# Patient Record
Sex: FEMALE | Race: ASIAN | NOT HISPANIC OR LATINO
[De-identification: names, ages, dates, MRNs, and addresses within clinical notes are randomized per-mention and may not be internally consistent; named-entity substitution may affect disease eponyms.]

---

## 2020-07-27 ENCOUNTER — APPOINTMENT (OUTPATIENT)
Dept: RADIOLOGY | Facility: IMAGING CENTER | Age: 60
End: 2020-07-27

## 2020-07-27 ENCOUNTER — OUTPATIENT (OUTPATIENT)
Dept: OUTPATIENT SERVICES | Facility: HOSPITAL | Age: 60
LOS: 1 days | End: 2020-07-27
Payer: MEDICAID

## 2020-07-27 DIAGNOSIS — Z00.8 ENCOUNTER FOR OTHER GENERAL EXAMINATION: ICD-10-CM

## 2020-07-27 PROCEDURE — 71100 X-RAY EXAM RIBS UNI 2 VIEWS: CPT

## 2020-07-27 PROCEDURE — 71100 X-RAY EXAM RIBS UNI 2 VIEWS: CPT | Mod: 26

## 2020-08-26 PROBLEM — Z00.00 ENCOUNTER FOR PREVENTIVE HEALTH EXAMINATION: Status: ACTIVE | Noted: 2020-08-26

## 2020-09-08 ENCOUNTER — APPOINTMENT (OUTPATIENT)
Dept: SURGERY | Facility: HOSPITAL | Age: 60
End: 2020-09-08

## 2020-09-08 ENCOUNTER — OUTPATIENT (OUTPATIENT)
Dept: OUTPATIENT SERVICES | Facility: HOSPITAL | Age: 60
LOS: 1 days | End: 2020-09-08

## 2020-09-08 VITALS
SYSTOLIC BLOOD PRESSURE: 149 MMHG | BODY MASS INDEX: 36.15 KG/M2 | DIASTOLIC BLOOD PRESSURE: 65 MMHG | WEIGHT: 217 LBS | HEART RATE: 65 BPM | HEIGHT: 65 IN

## 2020-09-08 VITALS — TEMPERATURE: 98.1 F

## 2020-09-08 DIAGNOSIS — R22.2 LOCALIZED SWELLING, MASS AND LUMP, TRUNK: ICD-10-CM

## 2020-09-08 DIAGNOSIS — Z86.79 PERSONAL HISTORY OF OTHER DISEASES OF THE CIRCULATORY SYSTEM: ICD-10-CM

## 2020-09-08 DIAGNOSIS — Z86.39 PERSONAL HISTORY OF OTHER ENDOCRINE, NUTRITIONAL AND METABOLIC DISEASE: ICD-10-CM

## 2020-09-23 ENCOUNTER — APPOINTMENT (OUTPATIENT)
Dept: OBGYN | Facility: HOSPITAL | Age: 60
End: 2020-09-23

## 2020-11-20 ENCOUNTER — APPOINTMENT (OUTPATIENT)
Dept: DERMATOLOGY | Facility: CLINIC | Age: 60
End: 2020-11-20

## 2022-01-20 ENCOUNTER — NON-APPOINTMENT (OUTPATIENT)
Age: 62
End: 2022-01-20

## 2022-01-26 ENCOUNTER — NON-APPOINTMENT (OUTPATIENT)
Age: 62
End: 2022-01-26

## 2022-01-26 ENCOUNTER — APPOINTMENT (OUTPATIENT)
Dept: ORTHOPEDIC SURGERY | Facility: CLINIC | Age: 62
End: 2022-01-26
Payer: MEDICAID

## 2022-01-26 VITALS — HEIGHT: 65 IN | BODY MASS INDEX: 35.65 KG/M2 | WEIGHT: 214 LBS

## 2022-01-26 PROCEDURE — 73562 X-RAY EXAM OF KNEE 3: CPT | Mod: 50

## 2022-01-26 PROCEDURE — 99204 OFFICE O/P NEW MOD 45 MIN: CPT

## 2022-01-26 NOTE — HISTORY OF PRESENT ILLNESS
[de-identified] : Patient is a 61-year-old female complaining of bilateral knee pain.  Severe pain in the bilateral thigh.  For many years.  No trauma fevers or chills.  Patient has history of diabetes.  Fairly well controlled.  Severe knee pain she has tried injections in the past and physical therapy [Worsening] : worsening [2] : a minimum pain level of 2/10 [9] : a maximum pain level of 9/10 [Acetaminophen] : not relieved by acetaminophen [Exercise Regimen] : not relieved by exercise regimen [NSAIDs] : not relieved by nonsteroidal anti-inflammatory drugs

## 2022-01-26 NOTE — PHYSICAL EXAM
[Antalgic] : antalgic [Normal RUE] : Right Upper Extremity: No scars, rashes, lesions, ulcers, skin intact [Normal Touch] : sensation intact for touch [Poor Appearance] : well-appearing [Normal] : no peripheral adenopathy appreciated [de-identified] : Patient appears stated age in no acute respiratory distress. Patient is alert oriented x3. Patient has normal mood and affect. \par Bilateral knee exam\par There is minimal to moderate effusion. Range of motion is 0-120°. Painful at the extremes of range of motion. \par There is medial and lateral joint line tenderness. \par Quadriceps strength is 4/5. There is some muscle loss in the quadriceps. \par Anteroposterior and mediolateral stability is intact Jacklyn test is negative. Alignment of the knee is in 5° of varus.\par 		\par \par Bilateral hip exam\par On inspection of the hip shows skin is normal. No evidence of rash. \par No loss of muscle.  Abductor strength is 5 out of 5. Hip flexor strength is 5.\par Range of motion of the hip at 90° flexion internal rotation is 15° external rotation is 30° pain-free. \par Hip has good stability in anterior and posterior direction. \par On lateral decubitus  examination there is no tenderness in the greater trochanter. \par Lower Extremity Examination \par Bilateral lower extremity skin is normal. There is no rash. There is no edema and lymphadenopathy.  DP and PT pulses intact. Sensation is intact. [de-identified] : X-rays of the bilateral knee 3 views each shows advanced degenerative bone-on-bone arthritis

## 2022-01-26 NOTE — DISCUSSION/SUMMARY
[de-identified] : At this time patient has bilateral knee bone-on-bone arthritis joint destruction we will try conservative treatment NSAIDs therapy strengthening

## 2022-02-09 ENCOUNTER — APPOINTMENT (OUTPATIENT)
Dept: ORTHOPEDIC SURGERY | Facility: HOSPITAL | Age: 62
End: 2022-02-09

## 2022-02-09 ENCOUNTER — OUTPATIENT (OUTPATIENT)
Dept: OUTPATIENT SERVICES | Facility: HOSPITAL | Age: 62
LOS: 1 days | End: 2022-02-09
Payer: MEDICAID

## 2022-02-09 VITALS
HEART RATE: 73 BPM | SYSTOLIC BLOOD PRESSURE: 151 MMHG | TEMPERATURE: 96.5 F | HEIGHT: 65 IN | DIASTOLIC BLOOD PRESSURE: 85 MMHG | WEIGHT: 215 LBS | BODY MASS INDEX: 35.82 KG/M2

## 2022-02-09 DIAGNOSIS — M17.0 BILATERAL PRIMARY OSTEOARTHRITIS OF KNEE: ICD-10-CM

## 2022-02-09 DIAGNOSIS — M25.50 PAIN IN UNSPECIFIED JOINT: ICD-10-CM

## 2022-02-09 PROCEDURE — G0463: CPT

## 2022-02-09 RX ORDER — LOSARTAN POTASSIUM AND HYDROCHLOROTHIAZIDE 12.5; 5 MG/1; MG/1
50-12.5 TABLET ORAL DAILY
Refills: 0 | Status: ACTIVE | COMMUNITY
Start: 2022-02-09

## 2022-02-09 RX ORDER — ESOMEPRAZOLE MAGNESIUM 40 MG/1
40 CAPSULE, DELAYED RELEASE ORAL DAILY
Refills: 0 | Status: ACTIVE | COMMUNITY
Start: 2022-02-09

## 2022-06-15 ENCOUNTER — OUTPATIENT (OUTPATIENT)
Dept: OUTPATIENT SERVICES | Facility: HOSPITAL | Age: 62
LOS: 1 days | End: 2022-06-15
Payer: MEDICAID

## 2022-06-15 ENCOUNTER — APPOINTMENT (OUTPATIENT)
Dept: ORTHOPEDIC SURGERY | Facility: HOSPITAL | Age: 62
End: 2022-06-15

## 2022-06-15 VITALS
WEIGHT: 215 LBS | HEART RATE: 63 BPM | DIASTOLIC BLOOD PRESSURE: 82 MMHG | RESPIRATION RATE: 14 BRPM | BODY MASS INDEX: 35.82 KG/M2 | HEIGHT: 65 IN | TEMPERATURE: 97.5 F | SYSTOLIC BLOOD PRESSURE: 148 MMHG

## 2022-06-15 DIAGNOSIS — M17.0 BILATERAL PRIMARY OSTEOARTHRITIS OF KNEE: ICD-10-CM

## 2022-06-15 DIAGNOSIS — M79.609 PAIN IN UNSPECIFIED LIMB: ICD-10-CM

## 2022-06-15 PROCEDURE — G0463: CPT

## 2022-06-15 RX ORDER — DICLOFENAC SODIUM 1% 10 MG/G
1 GEL TOPICAL DAILY
Qty: 1 | Refills: 0 | Status: ACTIVE | COMMUNITY
Start: 2022-06-15 | End: 1900-01-01

## 2022-06-15 RX ORDER — DICLOFENAC SODIUM 20 MG/G
2 SOLUTION TOPICAL
Qty: 1 | Refills: 1 | Status: COMPLETED | COMMUNITY
Start: 2022-06-15 | End: 2022-06-15

## 2022-10-12 ENCOUNTER — OUTPATIENT (OUTPATIENT)
Dept: OUTPATIENT SERVICES | Facility: HOSPITAL | Age: 62
LOS: 1 days | End: 2022-10-12
Payer: MEDICAID

## 2022-10-12 ENCOUNTER — APPOINTMENT (OUTPATIENT)
Dept: ORTHOPEDIC SURGERY | Facility: HOSPITAL | Age: 62
End: 2022-10-12

## 2022-10-12 VITALS
WEIGHT: 215 LBS | DIASTOLIC BLOOD PRESSURE: 82 MMHG | TEMPERATURE: 97.7 F | RESPIRATION RATE: 14 BRPM | SYSTOLIC BLOOD PRESSURE: 149 MMHG | BODY MASS INDEX: 35.82 KG/M2 | HEART RATE: 61 BPM | HEIGHT: 65 IN

## 2022-10-12 DIAGNOSIS — M79.643 PAIN IN UNSPECIFIED HAND: ICD-10-CM

## 2022-10-12 DIAGNOSIS — M17.0 BILATERAL PRIMARY OSTEOARTHRITIS OF KNEE: ICD-10-CM

## 2022-10-12 PROCEDURE — G0463: CPT

## 2022-10-12 RX ORDER — CELECOXIB 200 MG/1
200 CAPSULE ORAL DAILY
Qty: 60 | Refills: 0 | Status: ACTIVE | COMMUNITY
Start: 2022-10-12 | End: 1900-01-01

## 2022-10-12 RX ORDER — CELECOXIB 200 MG/1
200 CAPSULE ORAL TWICE DAILY
Qty: 56 | Refills: 0 | Status: DISCONTINUED | COMMUNITY
Start: 2022-02-09 | End: 2022-10-12

## 2023-01-04 ENCOUNTER — OUTPATIENT (OUTPATIENT)
Dept: OUTPATIENT SERVICES | Facility: HOSPITAL | Age: 63
LOS: 1 days | End: 2023-01-04
Payer: MEDICAID

## 2023-01-04 ENCOUNTER — APPOINTMENT (OUTPATIENT)
Dept: ORTHOPEDIC SURGERY | Facility: HOSPITAL | Age: 63
End: 2023-01-04

## 2023-01-04 VITALS
TEMPERATURE: 97.4 F | RESPIRATION RATE: 14 BRPM | DIASTOLIC BLOOD PRESSURE: 82 MMHG | HEART RATE: 64 BPM | HEIGHT: 65 IN | WEIGHT: 198 LBS | SYSTOLIC BLOOD PRESSURE: 152 MMHG | BODY MASS INDEX: 32.99 KG/M2

## 2023-01-04 DIAGNOSIS — M25.50 PAIN IN UNSPECIFIED JOINT: ICD-10-CM

## 2023-01-04 PROCEDURE — G0463: CPT

## 2023-01-04 RX ORDER — CELECOXIB 200 MG/1
200 CAPSULE ORAL TWICE DAILY
Qty: 60 | Refills: 0 | Status: ACTIVE | COMMUNITY
Start: 2022-06-15 | End: 1900-01-01

## 2023-01-04 RX ORDER — IBUPROFEN 400 MG/1
400 TABLET, FILM COATED ORAL 3 TIMES DAILY
Qty: 90 | Refills: 1 | Status: ACTIVE | COMMUNITY
Start: 2023-01-04 | End: 1900-01-01

## 2023-01-04 RX ORDER — DICLOFENAC SODIUM 1% 10 MG/G
1 GEL TOPICAL DAILY
Qty: 1 | Refills: 0 | Status: ACTIVE | COMMUNITY
Start: 2023-01-04 | End: 1900-01-01

## 2023-01-05 DIAGNOSIS — M17.0 BILATERAL PRIMARY OSTEOARTHRITIS OF KNEE: ICD-10-CM

## 2023-04-05 ENCOUNTER — RESULT REVIEW (OUTPATIENT)
Age: 63
End: 2023-04-05

## 2023-04-05 ENCOUNTER — OUTPATIENT (OUTPATIENT)
Dept: OUTPATIENT SERVICES | Facility: HOSPITAL | Age: 63
LOS: 1 days | End: 2023-04-05
Payer: MEDICAID

## 2023-04-05 ENCOUNTER — APPOINTMENT (OUTPATIENT)
Dept: ORTHOPEDIC SURGERY | Facility: HOSPITAL | Age: 63
End: 2023-04-05

## 2023-04-05 VITALS
RESPIRATION RATE: 14 BRPM | DIASTOLIC BLOOD PRESSURE: 84 MMHG | BODY MASS INDEX: 32.99 KG/M2 | SYSTOLIC BLOOD PRESSURE: 159 MMHG | HEIGHT: 65 IN | HEART RATE: 60 BPM | WEIGHT: 198 LBS | TEMPERATURE: 98 F

## 2023-04-05 DIAGNOSIS — M17.0 BILATERAL PRIMARY OSTEOARTHRITIS OF KNEE: ICD-10-CM

## 2023-04-05 DIAGNOSIS — M17.11 UNILATERAL PRIMARY OSTEOARTHRITIS, RIGHT KNEE: ICD-10-CM

## 2023-04-05 DIAGNOSIS — M79.9 SOFT TISSUE DISORDER, UNSPECIFIED: ICD-10-CM

## 2023-04-05 PROCEDURE — 72100 X-RAY EXAM L-S SPINE 2/3 VWS: CPT | Mod: 26

## 2023-04-05 PROCEDURE — G0463: CPT

## 2023-04-05 PROCEDURE — 72100 X-RAY EXAM L-S SPINE 2/3 VWS: CPT

## 2023-04-05 PROCEDURE — 73564 X-RAY EXAM KNEE 4 OR MORE: CPT | Mod: 26,50

## 2023-04-05 PROCEDURE — 73564 X-RAY EXAM KNEE 4 OR MORE: CPT

## 2023-04-05 RX ORDER — ASPIRIN ENTERIC COATED TABLETS 81 MG 81 MG/1
81 TABLET, DELAYED RELEASE ORAL DAILY
Refills: 0 | Status: ACTIVE | COMMUNITY
Start: 2023-04-05

## 2023-04-05 RX ORDER — FOLIC ACID 1 MG/1
1 TABLET ORAL DAILY
Refills: 0 | Status: ACTIVE | COMMUNITY
Start: 2023-04-05

## 2023-04-24 ENCOUNTER — APPOINTMENT (OUTPATIENT)
Dept: ORTHOPEDIC SURGERY | Facility: CLINIC | Age: 63
End: 2023-04-24
Payer: MEDICAID

## 2023-04-24 ENCOUNTER — TRANSCRIPTION ENCOUNTER (OUTPATIENT)
Age: 63
End: 2023-04-24

## 2023-04-24 VITALS
SYSTOLIC BLOOD PRESSURE: 157 MMHG | BODY MASS INDEX: 32.99 KG/M2 | OXYGEN SATURATION: 98 % | DIASTOLIC BLOOD PRESSURE: 84 MMHG | HEIGHT: 65 IN | HEART RATE: 61 BPM | TEMPERATURE: 97.2 F | WEIGHT: 198 LBS

## 2023-04-24 DIAGNOSIS — M53.3 SACROCOCCYGEAL DISORDERS, NOT ELSEWHERE CLASSIFIED: ICD-10-CM

## 2023-04-24 DIAGNOSIS — M17.0 BILATERAL PRIMARY OSTEOARTHRITIS OF KNEE: ICD-10-CM

## 2023-04-24 PROCEDURE — 99214 OFFICE O/P EST MOD 30 MIN: CPT

## 2023-04-24 NOTE — DISCUSSION/SUMMARY
[de-identified] : The patient has lower lumbar, sacral and coccygeal pain without trauma.  There is no evidence of fracture.  The pathology and natural history were discussed.  She will obtain a doughnut pillow to unweight the affected area.  She will take Tylenol for pain.  She will apply topical ointments.  In terms of her knees she is making an appointment for evaluation towards knee replacement with Dr. Glover.

## 2023-04-24 NOTE — HISTORY OF PRESENT ILLNESS
[de-identified] : 62 year old female presents for initial evaluation of chronic bilateral knee pain for the past 3 years. She has been seen in the clinic for this issue in the past, has tried PT and steroid injections. Her last injections were in January 2023 with 3 months of relief. Today she complains of constant aching pain, L>R worse with prolonged walking. She is unable to walk more than 2 minutes without pain. She takes Celebrex for pain and wears a knee brace for support. She also has had pain over her tailbone for the past 2 months with sitting.  She is planning to see Dr. Glover for knee surgery.\par

## 2023-04-24 NOTE — PHYSICAL EXAM
[Slightly Antalgic] : slightly antalgic [LE] : Sensory: Intact in bilateral lower extremities [DP] : dorsalis pedis 2+ and symmetric bilaterally [PT] : posterior tibial 2+ and symmetric bilaterally [Normal] : Alert and in no acute distress [Poor Appearance] : well-appearing [Acute Distress] : not in acute distress [de-identified] : The patient has no respiratory distress. Mood and affect are normal. The patient is alert and oriented to person, place and time.\par The patient has tenderness in the lower lumbar region.  She experiences mild pain with forward flexion.  There is tenderness over the sacrum and coccyx.  There is no pain with rotation of the hips.  She has pain with motion of both knees.  Quadriceps and hamstring function are intact.  There is no ligamentous instability.  Calves are soft and nontender.  The skin is intact.  There is no lymphedema. [de-identified] : ACC: [id number] EXAM: XR KNEE COMP 4+ VIEWS BI ORDERED BY: REGGIE OLSON\par \par PROCEDURE DATE: 04/05/2023\par \par INTERPRETATION: CLINICAL INDICATION: bilateral knee pain\par \par EXAM:\par Weightbearing AP oblique lateral and sunrise views of both knees from 4/5/2023 at 1342. No similar prior studies available for comparison.\par \par IMPRESSION:\par No fractures dislocations or joint effusions.\par \par Fairly symmetric advanced bilateral knee tricompartment osteoarthritis with medial tibiofemoral compartment predominance demonstrating contacting joint surfaces and genu varus deformities. No joint margin erosions.\par \par Left more conspicuous than right inferior patellar enthesophytes.\par \par Generalized osteopenia otherwise no discrete lytic or blastic lesions.\par \par --- End of Report ---\par \par \par HAZEL BAIN MD; Attending Radiologist\par This document has been electronically signed. Apr 5 2023 5:20PM\par \par \par \par ACC: [id number] EXAM: XR LS SPINE AP LAT 2-3 VIEWS ORDERED BY: REGGIE OLSON\par \par PROCEDURE DATE: 04/05/2023\par INTERPRETATION: CLINICAL INDICATION: low back pain\par \par EXAM:\par AP lateral coned-down lateral lumbosacral spine from 4/5/2023 at 1342. No similar prior studies available for comparison.\par \par IMPRESSION:\par No compression fractures, spondylolistheses, or spondylolysis defects.\par \par Protuberant degenerative hypertrophic osteophyte formation along right lateral L1-L2 disc margin. Otherwise preserved intervertebral disc spaces.\par \par Unremarkable SI joints and partially visualized hips.\par \par No lytic or blastic lesions.\par \par Surgical clips in right inguinal, upper left hemipelvic, and right upper quadrant regions.\par \par --- End of Report ---\par \par \par HAZEL BAIN MD; Attending Radiologist\par This document has been electronically signed. Apr 5 2023 5:18PM

## 2023-04-27 ENCOUNTER — APPOINTMENT (OUTPATIENT)
Dept: ORTHOPEDIC SURGERY | Facility: CLINIC | Age: 63
End: 2023-04-27
Payer: MEDICAID

## 2023-04-27 VITALS
SYSTOLIC BLOOD PRESSURE: 173 MMHG | DIASTOLIC BLOOD PRESSURE: 98 MMHG | OXYGEN SATURATION: 96 % | WEIGHT: 198 LBS | HEART RATE: 67 BPM | BODY MASS INDEX: 32.99 KG/M2 | HEIGHT: 65 IN

## 2023-04-27 DIAGNOSIS — M17.12 UNILATERAL PRIMARY OSTEOARTHRITIS, LEFT KNEE: ICD-10-CM

## 2023-04-27 PROCEDURE — 73564 X-RAY EXAM KNEE 4 OR MORE: CPT | Mod: 50

## 2023-04-27 PROCEDURE — 99204 OFFICE O/P NEW MOD 45 MIN: CPT

## 2023-04-27 PROCEDURE — 72170 X-RAY EXAM OF PELVIS: CPT

## 2023-04-27 RX ORDER — CELECOXIB 200 MG/1
200 CAPSULE ORAL DAILY
Qty: 14 | Refills: 1 | Status: ACTIVE | COMMUNITY
Start: 2023-04-27 | End: 1900-01-01

## 2023-04-27 NOTE — DISCUSSION/SUMMARY
[de-identified] : Ms. Spencer is a 62-year-old female who presented to the office for evaluation of her bilateral knee pain.  Patient has been experiencing bilateral (left greater than right) knee pain for the last 10 years.  She has tried physical therapy, anti-inflammatories, and injections.  Pain is now affecting her quality of life.  X-rays showed severe bilateral knee osteoarthritis.  Examination showed decreased bilateral knee range of motion and varus alignment. Discussed with patient the examination and imaging findings.  Discussed with patient the operative and nonoperative management of knee osteoarthritis, including total knee arthroplasty.  Discussed the nonoperative management of knee osteoarthritis, including physical therapy, anti-inflammatories, and injections.  Patient has tried nonoperative management, but continues to have knee pain.  Discussed total knee arthroplasty at length, including surgical procedure, hospital course, DVT prophylaxis, antibiotics, physical therapy, recovery, and risks. Patient would like to proceed with total knee arthroplasty.  Discussed that patient will require medical clearance prior to surgery.  Discussed obtaining a CT scan prior to surgery.  Patient understanding and in agreement with the plan.  All questions answered. \par \par Discussed the imaging and physical exam findings with the patient consistent with endstage knee degenerative disease. The patient has failed conservative management including physical therapy, anti-inflammatories, and injections. The risks, benefits and alternatives to total knee replacement were discussed with the patient in detail and the patient elected to proceed with surgery. Discussed the surgical plan with the patient including implant options and surgical approach. \par \par Surgical risks including fracture requiring fixation, instability or dislocation, temporary or permanent leg length inequality, infection, bleeding, stiffness, failure to alleviate pain, failure to achieve desired results, need for further surgery, scar tissue formation, hardware failure, chronic pain, injury to nerves resulting in extremity dysfunction, injury to arteries and veins, deep vein thrombosis or pulmonary embolism requiring anticoagulation and medical risk factors including heart attack, stroke, death, neurological injury, pneumonia, kidney or other organ failure were discussed with the patient. \par \par The patient was also advised that the patient will undergo testing for COVID-19 prior to surgery and that if any evidence of COVID-19 is noted pre-operatively, the surgery may be cancelled. \par \par Patient was understanding and in agreement with the treatment plan. All questions answered.\par \par Plan:\par -Medical Clearance\par -CT Scan Left Lower Extremity\par -Left Total Knee Arthroplasty\par \par Surgical Plan:\par Diagnosis: Left Knee Osteoarthritis\par Laterality: Left\par Operative procedure: Left Total Knee Arthroplasty\par Location: [facility]\par \par DVT prophylaxis: Aspirin 325mg twice per day\par TXA: IV\par Plan for discharge: Home \par Pre- & Post Operative Antibiotics: Cefazolin 2g\par \par Clearances:\par      Medical: Pending\par \par Comorbidities:\par      Metal Allergy: Negative\par      Chronic Pain: Negative\par      Diabetes: Negative, Last A1C: 5.7-5.9\par      Use of Anticoagulation: ASA 81mg once per day\par      Atrial Fibrillation: Negative\par      History of VTE: Negative\par      History of Cardiac Stents: Negative\par      Skin Infections/Open Wounds: Negative\par      MRSA Infection/Colonization: Negative\par      Current Urinary Symptoms: Negative\par      Immunocompromise: Negative \par      Inflammatory Arthritis: Psoriasis, only uses creams, no oral medications\par      Smoking: Negative\par      Drug Use: Negative\par      Alcohol Use: Negative\par      Obstructive Sleep Apnea: Negative\par      Neurologic Disease: Negative

## 2023-04-27 NOTE — HISTORY OF PRESENT ILLNESS
[de-identified] : Translated by Daughter in law\par Ambreen  ID: [id number]\par \par Ms. Spencer is a 62-year-old female presents to the office for evaluation of her bilateral knee pain.  Patient has been experiencing bilateral (left greater than right) knee pain for many years (about 10 years), but has worsened over the last 2 years.  Pain is worse when getting up from a seated position.  Patient can walk about 1 block before experiencing pain and she has difficulty walking.  The pain is now affecting her quality of life.  She has tried 3-4 corticosteroid injections in her bilateral knees, which lasted about 3 months.  Her last injection was on 1/4/2023, which lasted about 2 to 3 months.  She has tried physical therapy and home exercises, with some relief, but not significant.  She has tried Celebrex, Tylenol, and Motrin, with some relief, but now medications are not significantly helping.  No hip or groin pain.  No back pain.  Patient does have some coccygeal pain that is worse with sitting.\par \par History: HTN, Diabetes (Last A1C: 5.7-5.9), HLD

## 2023-04-27 NOTE — PHYSICAL EXAM
[de-identified] : Constitutional: 62 year old female, alert and oriented, cooperative, in no acute distress.\par \par HEENT \par NC/AT.  Appearance: symmetric\par \par Chest/Respiratory \par Respiratory effort: no intercostal retractions or use of accessory muscles. Nonlabored Breathing\par \par Mental Status: \par Judgment, insight: intact\par Orientation: oriented to time, place, and person\par \par Left Knee\par \par Inspection:\par     Skin intact, no rashes or lesions\par     No Effusion\par     Non-tender to palpation over tibial tubercle, patella, and pes insertion.\par     Tenderness over the medial and lateral joint line at the midcoronal line\par \par Range of Motion:\par 	Extension - 0 degrees\par 	Flexion - 105 degrees\par 	Alignment - Varus 8 degrees (Correctable to Varus 3 Degrees)\par 	Extensor lag: None\par \par Stability:\par      Demonstrates no Varus or Valgus instability\par      Negative Anterior or Posterior drawer.\par      Negative Lachman's\par      Negative McMurrays\par \par Patella: stable, tracks well. \par \par Right Knee\par \par Inspection:\par     Skin intact, no rashes or lesions\par     No Effusion\par     Non-tender to palpation over tibial tubercle, patella, lateral joint line, and pes insertion.\par     Tenderness over the medial joint line at the midcoronal line\par \par Range of Motion:\par 	Extension - 0 degrees\par 	Flexion - 100 degrees\par 	Alignment - Varus 5 degrees (Correctable)\par 	Extensor lag: None\par \par Stability:\par      Demonstrates no Varus or Valgus instability\par      Negative Anterior or Posterior drawer.\par      Negative Lachman's\par      Negative McMurrays\par \par Patella: stable, tracks well. \par \par Neurologic Exam\par     Motor intact including 5/5 Extensor Hallucis Longus, 5/5 Flexor Hallucis Longus, 5/5 Tibialis Anterior and 5/5 Gastrocnemius\par     Sensation Intact to Light Touch including Saphenous, Sural, Superficial Peroneal, Deep Peroneal, Tibial nerve distributions\par \par Vascular Exam\par     Foot is warm and well perfused with 2+ Dorsalis Pedis Pulse \par \par No pain with range of motion of the bilateral hips. No lumbar paraspinal muscle tenderness. Tenderness over the Coccyx [de-identified] : XRay:  XRays of the Pelvis (1 View) taken in the office today and discussed with the patient. XRays demonstrate no obvious fracture or dislocation. There is no significant evidence of osteoarthritis or osteophyte formation. (my personal interpretation). \par \par XRay: XRays of the Right Knee (4 Views) taken in the office today and reviewed with the patient. XRays demonstrate tricompartmental joint space narrowing, with bone on bone articulations in the medial compartment, with subchondral sclerosis, overlying osteophytes, all consistent with severe osteoarthritis, KL rdGrdrrdarddrderd:rd rd3rd. There is varus alignment. (my personal interpretation)\par \par XRay: XRays of the Left Knee (4 Views) taken in the office today and reviewed with the patient. XRays demonstrate tricompartmental joint space narrowing, with bone on bone articulations in the medial compartment, with subchondral sclerosis, overlying osteophytes, all consistent with severe osteoarthritis, KL rdGrdrrdarddrderd:rd rd3rd. There is varus alignment. (my personal interpretation)

## 2023-05-02 ENCOUNTER — FORM ENCOUNTER (OUTPATIENT)
Age: 63
End: 2023-05-02

## 2023-05-08 ENCOUNTER — OUTPATIENT (OUTPATIENT)
Dept: OUTPATIENT SERVICES | Facility: HOSPITAL | Age: 63
LOS: 1 days | End: 2023-05-08
Payer: MEDICAID

## 2023-05-08 ENCOUNTER — APPOINTMENT (OUTPATIENT)
Dept: CT IMAGING | Facility: CLINIC | Age: 63
End: 2023-05-08
Payer: MEDICAID

## 2023-05-08 DIAGNOSIS — M17.12 UNILATERAL PRIMARY OSTEOARTHRITIS, LEFT KNEE: ICD-10-CM

## 2023-05-08 PROCEDURE — 73700 CT LOWER EXTREMITY W/O DYE: CPT | Mod: 26,LT

## 2023-05-08 PROCEDURE — 73700 CT LOWER EXTREMITY W/O DYE: CPT

## 2023-05-10 ENCOUNTER — APPOINTMENT (OUTPATIENT)
Dept: ORTHOPEDIC SURGERY | Facility: CLINIC | Age: 63
End: 2023-05-10
Payer: MEDICAID

## 2023-05-10 VITALS
SYSTOLIC BLOOD PRESSURE: 164 MMHG | HEIGHT: 65 IN | DIASTOLIC BLOOD PRESSURE: 88 MMHG | WEIGHT: 198 LBS | BODY MASS INDEX: 32.99 KG/M2

## 2023-05-10 DIAGNOSIS — M54.9 DORSALGIA, UNSPECIFIED: ICD-10-CM

## 2023-05-10 PROCEDURE — 99072 ADDL SUPL MATRL&STAF TM PHE: CPT

## 2023-05-10 PROCEDURE — 99214 OFFICE O/P EST MOD 30 MIN: CPT

## 2023-05-10 RX ORDER — TIZANIDINE 2 MG/1
2 TABLET ORAL EVERY 6 HOURS
Qty: 56 | Refills: 0 | Status: ACTIVE | COMMUNITY
Start: 2023-05-10 | End: 1900-01-01

## 2023-05-10 NOTE — ADDENDUM
[FreeTextEntry1] : I, Janelle Nicolas, acted solely as a scribe for Dr. Linus San MD on this date 05/10/2023  \par \par All medical record entries made by the Scribe were at my, Dr. Linus San MD., direction and personally dictated by me on 05/10/2023 . I have reviewed the chart and agree that the record accurately reflects my personal performance of the history, physical exam, assessment and plan. I have also personally directed, reviewed, and agreed with the chart.

## 2023-05-10 NOTE — HISTORY OF PRESENT ILLNESS
[de-identified] : 62 year old female who presents for initial evaluation of her lower back pain. Patient is accompanied by daughter who reports recent onset of back pain and is concerned that the pain may be due to a fracture from sitting for long periods of time. She reports pain with certain movements of the back. She reports the pain is localized to the back with no radicular sxs.\par Denies any bowel/bladder incontinence. Denies any saddle anesthesia.

## 2023-05-10 NOTE — PHYSICAL EXAM
[de-identified] : Lumbar Physical Exam\par \par Gait - Normal\par \par Station - Normal\par \par Sagittal balance - Normal\par \par Compensatory mechanism? - None\par \par Reflexes\par Patellar - normal\par Gastroc - normal\par Clonus - No\par \par Hip Exam - Normal\par \par Straight leg raise - none\par \par Pulses - 2+ dp/pt\par \par Range of motion - normal\par \par Sensation\par Sensation intact to light touch in L1, L2, L3, L4, L5 and S1 dermatomes bilaterally\par \par Motor \par 	IP	Quad	HS	TA	Gastroc	EHL\par Right	5/5	5/5	5/5	5/5	5/5	5/5\par Left	5/5	5/5	5/5	5/5	5/5	5/5  [de-identified] : Lumbar Radiographs reviewed\par facet arthropathy noted \par degenerative disc changes

## 2023-05-17 ENCOUNTER — APPOINTMENT (OUTPATIENT)
Dept: CT IMAGING | Facility: CLINIC | Age: 63
End: 2023-05-17

## 2023-05-17 ENCOUNTER — OUTPATIENT (OUTPATIENT)
Dept: OUTPATIENT SERVICES | Facility: HOSPITAL | Age: 63
LOS: 1 days | End: 2023-05-17
Payer: MEDICAID

## 2023-05-17 VITALS
HEIGHT: 63 IN | TEMPERATURE: 98 F | WEIGHT: 197.98 LBS | SYSTOLIC BLOOD PRESSURE: 135 MMHG | RESPIRATION RATE: 17 BRPM | DIASTOLIC BLOOD PRESSURE: 75 MMHG | OXYGEN SATURATION: 98 % | HEART RATE: 57 BPM

## 2023-05-17 DIAGNOSIS — M17.12 UNILATERAL PRIMARY OSTEOARTHRITIS, LEFT KNEE: ICD-10-CM

## 2023-05-17 DIAGNOSIS — Z98.49 CATARACT EXTRACTION STATUS, UNSPECIFIED EYE: Chronic | ICD-10-CM

## 2023-05-17 DIAGNOSIS — I10 ESSENTIAL (PRIMARY) HYPERTENSION: ICD-10-CM

## 2023-05-17 DIAGNOSIS — E11.9 TYPE 2 DIABETES MELLITUS WITHOUT COMPLICATIONS: ICD-10-CM

## 2023-05-17 DIAGNOSIS — Z90.710 ACQUIRED ABSENCE OF BOTH CERVIX AND UTERUS: Chronic | ICD-10-CM

## 2023-05-17 DIAGNOSIS — Z91.89 OTHER SPECIFIED PERSONAL RISK FACTORS, NOT ELSEWHERE CLASSIFIED: ICD-10-CM

## 2023-05-17 DIAGNOSIS — Z98.890 OTHER SPECIFIED POSTPROCEDURAL STATES: Chronic | ICD-10-CM

## 2023-05-17 DIAGNOSIS — Z90.49 ACQUIRED ABSENCE OF OTHER SPECIFIED PARTS OF DIGESTIVE TRACT: Chronic | ICD-10-CM

## 2023-05-17 LAB
A1C WITH ESTIMATED AVERAGE GLUCOSE RESULT: 5.4 % — SIGNIFICANT CHANGE UP (ref 4–5.6)
ANION GAP SERPL CALC-SCNC: 14 MMOL/L — SIGNIFICANT CHANGE UP (ref 7–14)
APPEARANCE UR: CLEAR — SIGNIFICANT CHANGE UP
BACTERIA # UR AUTO: NEGATIVE — SIGNIFICANT CHANGE UP
BILIRUB UR-MCNC: NEGATIVE — SIGNIFICANT CHANGE UP
BLD GP AB SCN SERPL QL: NEGATIVE — SIGNIFICANT CHANGE UP
BUN SERPL-MCNC: 9 MG/DL — SIGNIFICANT CHANGE UP (ref 7–23)
CALCIUM SERPL-MCNC: 9.1 MG/DL — SIGNIFICANT CHANGE UP (ref 8.4–10.5)
CHLORIDE SERPL-SCNC: 100 MMOL/L — SIGNIFICANT CHANGE UP (ref 98–107)
CO2 SERPL-SCNC: 24 MMOL/L — SIGNIFICANT CHANGE UP (ref 22–31)
COLOR SPEC: ABNORMAL
CREAT SERPL-MCNC: 0.7 MG/DL — SIGNIFICANT CHANGE UP (ref 0.5–1.3)
DIFF PNL FLD: NEGATIVE — SIGNIFICANT CHANGE UP
EGFR: 97 ML/MIN/1.73M2 — SIGNIFICANT CHANGE UP
EPI CELLS # UR: 3 /HPF — SIGNIFICANT CHANGE UP (ref 0–5)
ESTIMATED AVERAGE GLUCOSE: 108 — SIGNIFICANT CHANGE UP
GLUCOSE SERPL-MCNC: 83 MG/DL — SIGNIFICANT CHANGE UP (ref 70–99)
GLUCOSE UR QL: NEGATIVE — SIGNIFICANT CHANGE UP
HCT VFR BLD CALC: 30.9 % — LOW (ref 34.5–45)
HGB BLD-MCNC: 10.1 G/DL — LOW (ref 11.5–15.5)
HYALINE CASTS # UR AUTO: 1 /LPF — SIGNIFICANT CHANGE UP (ref 0–7)
KETONES UR-MCNC: NEGATIVE — SIGNIFICANT CHANGE UP
LEUKOCYTE ESTERASE UR-ACNC: NEGATIVE — SIGNIFICANT CHANGE UP
MCHC RBC-ENTMCNC: 28.9 PG — SIGNIFICANT CHANGE UP (ref 27–34)
MCHC RBC-ENTMCNC: 32.7 GM/DL — SIGNIFICANT CHANGE UP (ref 32–36)
MCV RBC AUTO: 88.5 FL — SIGNIFICANT CHANGE UP (ref 80–100)
MRSA PCR RESULT.: SIGNIFICANT CHANGE UP
NITRITE UR-MCNC: NEGATIVE — SIGNIFICANT CHANGE UP
NRBC # BLD: 0 /100 WBCS — SIGNIFICANT CHANGE UP (ref 0–0)
NRBC # FLD: 0 K/UL — SIGNIFICANT CHANGE UP (ref 0–0)
PH UR: 7 — SIGNIFICANT CHANGE UP (ref 5–8)
PLATELET # BLD AUTO: 288 K/UL — SIGNIFICANT CHANGE UP (ref 150–400)
POTASSIUM SERPL-MCNC: 3.6 MMOL/L — SIGNIFICANT CHANGE UP (ref 3.5–5.3)
POTASSIUM SERPL-SCNC: 3.6 MMOL/L — SIGNIFICANT CHANGE UP (ref 3.5–5.3)
PROT UR-MCNC: ABNORMAL
RBC # BLD: 3.49 M/UL — LOW (ref 3.8–5.2)
RBC # FLD: 12.8 % — SIGNIFICANT CHANGE UP (ref 10.3–14.5)
RBC CASTS # UR COMP ASSIST: 4 /HPF — SIGNIFICANT CHANGE UP (ref 0–4)
RH IG SCN BLD-IMP: POSITIVE — SIGNIFICANT CHANGE UP
S AUREUS DNA NOSE QL NAA+PROBE: SIGNIFICANT CHANGE UP
SODIUM SERPL-SCNC: 138 MMOL/L — SIGNIFICANT CHANGE UP (ref 135–145)
SP GR SPEC: 1.02 — SIGNIFICANT CHANGE UP (ref 1.01–1.05)
UROBILINOGEN FLD QL: SIGNIFICANT CHANGE UP
WBC # BLD: 5.63 K/UL — SIGNIFICANT CHANGE UP (ref 3.8–10.5)
WBC # FLD AUTO: 5.63 K/UL — SIGNIFICANT CHANGE UP (ref 3.8–10.5)
WBC UR QL: 2 /HPF — SIGNIFICANT CHANGE UP (ref 0–5)

## 2023-05-17 PROCEDURE — 93010 ELECTROCARDIOGRAM REPORT: CPT

## 2023-05-17 RX ORDER — SODIUM CHLORIDE 9 MG/ML
3 INJECTION INTRAMUSCULAR; INTRAVENOUS; SUBCUTANEOUS EVERY 8 HOURS
Refills: 0 | Status: DISCONTINUED | OUTPATIENT
Start: 2023-05-22 | End: 2023-05-24

## 2023-05-17 RX ORDER — SODIUM CHLORIDE 9 MG/ML
1000 INJECTION, SOLUTION INTRAVENOUS
Refills: 0 | Status: DISCONTINUED | OUTPATIENT
Start: 2023-05-22 | End: 2023-05-24

## 2023-05-17 NOTE — H&P PST ADULT - ATTENDING COMMENTS
Ambreen  ID:     Gurpreet Spencer is a 62-year-old female, past medical history of hypertension, diabetes, and hyperlipidemia, who presented to the office for her bilateral knee pain. Patient had been experiencing bilateral (left greater than right) knee pain for about 10 years, but the pain worsened over the last 2 years. Pain was worse when getting up from a seated position. Patient could walk about 1 block before experiencing pain and she had difficulty walking. The pain was affecting her quality of life. She had tried 3-4 corticosteroid injections in her bilateral knees, which lasted about 3 months. Her last injection was on 1/4/2023, which lasted about 2 to 3 months. She had tried physical therapy and home exercises. She has tried Celebrex, Tylenol, and Motrin. XRays showed severe bilateral knee osteoarthritis. Patient was indicated for a Left Total Knee Arthroplasty. She was cleared by Medicine.    Discussed the operative and nonoperative management of knee osteoarthritis at length with the patient. Nonoperative management would consist of pain control, physical therapy, and anti-inflammatories. The patient had failed conservative management, including physical therapy, anti-inflammatories, and injections. The patient did not want to continue nonoperative management due to the impact knee pain has had on the patient’s quality of life. Operative management would consist of total knee arthroplasty. The risks, benefits and alternatives to total knee arthroplasty were discussed with the patient in detail and the patient elected to proceed with surgery. Discussed the surgical plan and implants with the patient, including    robotic assisted total knee arthroplasty. Discussed the recovery process following total knee arthroplasty at length, including, but not limited to, inpatient hospital stay, physical therapy, recovery, antibiotics, and DVT prophylaxis. Surgical risks including fracture requiring fixation, instability or dislocation, temporary or permanent leg length inequality, risks of cementation, infection, bleeding, stiffness, failure to alleviate pain, failure to achieve desired results, need for further surgery, scar tissue formation, hardware failure, component loosening, chronic pain, injury to nerves resulting in extremity dysfunction, injury to arteries and veins, deep vein thrombosis or pulmonary embolism requiring anticoagulation and medical risk factors including heart attack, stroke, death, neurological injury, pneumonia, kidney or other organ failure were discussed with the patient at length.    Following discussion, patient elected to proceed with Left Total Knee Arthroplasty with MEIR Robotic Assistance. Informed consent was obtained. Patient's leg was then marked with verbal confirmation of the patient. Patient was understanding and in agreement with the operative plan. All questions were answered.    Physical Exam:  Skin intact, no erythema  Motor: Intact EHL/FHL/Tibialis Anterior/Gastrocnemius  Sensory: Intact Superficial Peroneal/Deep Peroneal/Saphenous/Sural/Tibial Nerves  Vascular: 2+ DP Pulse    Assessment/Plan:   Gurpreet Spencer is a 62 year old female who presented for Left Total Knee Arthroplasty with MEIR Robotic Assistance    Plan:  -Left Total Knee Arthroplasty with MEIR Robotic Assistance  -Clearance in Chart Ambreen  ID: [id number]    Gurpreet Spencer is a 62-year-old female, past medical history of hypertension, diabetes, and hyperlipidemia, who presented to the office for her bilateral knee pain. Patient had been experiencing bilateral (left greater than right) knee pain for about 10 years, but the pain worsened over the last 2 years. Pain was worse when getting up from a seated position. Patient could walk about 1 block before experiencing pain and she had difficulty walking. The pain was affecting her quality of life. She had tried 3-4 corticosteroid injections in her bilateral knees, which lasted about 3 months. Her last injection was on 1/4/2023, which lasted about 2 to 3 months. She had tried physical therapy and home exercises. She has tried Celebrex, Tylenol, and Motrin. XRays showed severe bilateral knee osteoarthritis. Patient was indicated for a Left Total Knee Arthroplasty. She was cleared by Medicine.    Discussed the operative and nonoperative management of knee osteoarthritis at length with the patient. Nonoperative management would consist of pain control, physical therapy, and anti-inflammatories. The patient had failed conservative management, including physical therapy, anti-inflammatories, and injections. The patient did not want to continue nonoperative management due to the impact knee pain has had on the patient’s quality of life. Operative management would consist of total knee arthroplasty. The risks, benefits and alternatives to total knee arthroplasty were discussed with the patient in detail and the patient elected to proceed with surgery. Discussed the surgical plan and implants with the patient, including    robotic assisted total knee arthroplasty. Discussed the recovery process following total knee arthroplasty at length, including, but not limited to, inpatient hospital stay, physical therapy, recovery, antibiotics, and DVT prophylaxis. Surgical risks including fracture requiring fixation, instability or dislocation, temporary or permanent leg length inequality, risks of cementation, infection, bleeding, stiffness, failure to alleviate pain, failure to achieve desired results, need for further surgery, scar tissue formation, hardware failure, component loosening, chronic pain, injury to nerves resulting in extremity dysfunction, injury to arteries and veins, deep vein thrombosis or pulmonary embolism requiring anticoagulation and medical risk factors including heart attack, stroke, death, neurological injury, pneumonia, kidney or other organ failure were discussed with the patient at length.    Following discussion, patient elected to proceed with Left Total Knee Arthroplasty with MEIR Robotic Assistance. Informed consent was obtained. Patient's leg was then marked with verbal confirmation of the patient. Patient was understanding and in agreement with the operative plan. All questions were answered.    Physical Exam:  Skin intact, no erythema  Motor: Intact EHL/FHL/Tibialis Anterior/Gastrocnemius  Sensory: Intact Superficial Peroneal/Deep Peroneal/Saphenous/Sural/Tibial Nerves  Vascular: 2+ DP Pulse    Assessment/Plan:   Gurpreet Spencer is a 62 year old female who presented for Left Total Knee Arthroplasty with MEIR Robotic Assistance    Plan:  -Left Total Knee Arthroplasty with MEIR Robotic Assistance  -Clearance in Chart

## 2023-05-17 NOTE — H&P PST ADULT - PRIMARY CARE PROVIDER
Dr. Serna Dr. Serna, H Dr. Serna, [facility],  [phone]

## 2023-05-17 NOTE — H&P PST ADULT - NSICDXPASTMEDICALHX_GEN_ALL_CORE_FT
PAST MEDICAL HISTORY:  HLD (hyperlipidemia)     HTN (hypertension)     Type 2 diabetes mellitus      PAST MEDICAL HISTORY:  HLD (hyperlipidemia)     HTN (hypertension)     Obesity     Type 2 diabetes mellitus

## 2023-05-17 NOTE — H&P PST ADULT - MS GEN HX ROS MEA POS PC
arthritis/joint swelling/joint pain/stiffness/back pain arthritis/joint swelling/joint pain/muscle weakness/stiffness/back pain

## 2023-05-17 NOTE — H&P PST ADULT - RESPIRATORY
clear to auscultation bilaterally/no wheezes/no rales/no rhonchi/airway patent clear to auscultation bilaterally/no wheezes/no rales/no rhonchi/no respiratory distress/airway patent/breath sounds equal/good air movement

## 2023-05-17 NOTE — H&P PST ADULT - HISTORY OF PRESENT ILLNESS
severe arthritis, long term h/o bilateral knee pain , L>R. S/P steroid injections x 3 with moderate relief. Had intermittent  P.T with minimal relief.  63 y/o F with H/O: HTN, DM, HLD, Obesity presents to PST for pre op evaluation with long term h/o bilateral knee pain , L>R. S/P steroid injections x 3 with moderate relief. Had intermittent  P.T with minimal relief.  63 y/o F with H/O: HTN, DM, HLD, Obesity presents to PST for pre op evaluation with long term h/o bilateral knee pain , L>R. S/P steroid injections x 3 with moderate relief. Had intermittent P.T with minimal relief. Pre op diagnosis: unilateral primary osteoarthritis left knee. Now schedule for left total knee arthroplasty tentatively on 05/22/23

## 2023-05-17 NOTE — H&P PST ADULT - PROBLEM SELECTOR PLAN 1
Schedule for left total knee arthroplasty tentatively on 05/22/23. Pre op instructions, famotidine, chlorhexidine gluconate soap given and explained. Pt verbalized understanding.    Medical consultation as per surgeon's request

## 2023-05-17 NOTE — H&P PST ADULT - NSICDXPASTSURGICALHX_GEN_ALL_CORE_FT
PAST SURGICAL HISTORY:  H/O cataract extraction     History of cholecystectomy     Status post excision of lipoma     Status post total abdominal hysterectomy and bilateral salpingo-oophorectomy (REGGIE-BSO)

## 2023-05-17 NOTE — H&P PST ADULT - HAVE YOU HAD COVID IN THE LAST 60 DAYS?
Agency/Facility Name: [facility]  Outcome: Via faxed response.  Declined.  No accepting psychiatrist at this time.       No

## 2023-05-18 LAB
CULTURE RESULTS: SIGNIFICANT CHANGE UP
SPECIMEN SOURCE: SIGNIFICANT CHANGE UP

## 2023-05-19 PROBLEM — E11.9 TYPE 2 DIABETES MELLITUS WITHOUT COMPLICATIONS: Chronic | Status: ACTIVE | Noted: 2023-05-17

## 2023-05-19 PROBLEM — I10 ESSENTIAL (PRIMARY) HYPERTENSION: Chronic | Status: ACTIVE | Noted: 2023-05-17

## 2023-05-19 PROBLEM — E78.5 HYPERLIPIDEMIA, UNSPECIFIED: Chronic | Status: ACTIVE | Noted: 2023-05-17

## 2023-05-19 PROBLEM — E66.9 OBESITY, UNSPECIFIED: Chronic | Status: ACTIVE | Noted: 2023-05-17

## 2023-05-19 NOTE — ASU PATIENT PROFILE, ADULT - FALL HARM RISK - UNIVERSAL INTERVENTIONS
Bed in lowest position, wheels locked, appropriate side rails in place/Call bell, personal items and telephone in reach/Instruct patient to call for assistance before getting out of bed or chair/Non-slip footwear when patient is out of bed/[place] to call system/Physically safe environment - no spills, clutter or unnecessary equipment/Purposeful Proactive Rounding/Room/bathroom lighting operational, light cord in reach

## 2023-05-19 NOTE — ASU PATIENT PROFILE, ADULT - NSICDXPASTMEDICALHX_GEN_ALL_CORE_FT
PAST MEDICAL HISTORY:  HLD (hyperlipidemia)     HTN (hypertension)     Obesity     Type 2 diabetes mellitus

## 2023-05-19 NOTE — ASU PATIENT PROFILE, ADULT - PATIENT REPRESENTATIVE PHONE
Please add her to my schedule for a clinic visit later today ( office visit)   if she is still coughing  
Mika Cooper
[phone]

## 2023-05-21 ENCOUNTER — TRANSCRIPTION ENCOUNTER (OUTPATIENT)
Age: 63
End: 2023-05-21

## 2023-05-22 ENCOUNTER — TRANSCRIPTION ENCOUNTER (OUTPATIENT)
Age: 63
End: 2023-05-22

## 2023-05-22 ENCOUNTER — INPATIENT (INPATIENT)
Facility: HOSPITAL | Age: 63
LOS: 1 days | Discharge: NURSING, REHAB OR LONG-TERM CARE | End: 2023-05-24
Attending: STUDENT IN AN ORGANIZED HEALTH CARE EDUCATION/TRAINING PROGRAM | Admitting: STUDENT IN AN ORGANIZED HEALTH CARE EDUCATION/TRAINING PROGRAM
Payer: MEDICAID

## 2023-05-22 ENCOUNTER — APPOINTMENT (OUTPATIENT)
Dept: ORTHOPEDIC SURGERY | Facility: HOSPITAL | Age: 63
End: 2023-05-22

## 2023-05-22 VITALS
HEART RATE: 58 BPM | WEIGHT: 197.98 LBS | TEMPERATURE: 97 F | HEIGHT: 63 IN | RESPIRATION RATE: 16 BRPM | SYSTOLIC BLOOD PRESSURE: 148 MMHG | DIASTOLIC BLOOD PRESSURE: 70 MMHG | OXYGEN SATURATION: 99 %

## 2023-05-22 DIAGNOSIS — Z90.49 ACQUIRED ABSENCE OF OTHER SPECIFIED PARTS OF DIGESTIVE TRACT: Chronic | ICD-10-CM

## 2023-05-22 DIAGNOSIS — M17.12 UNILATERAL PRIMARY OSTEOARTHRITIS, LEFT KNEE: ICD-10-CM

## 2023-05-22 DIAGNOSIS — Z90.710 ACQUIRED ABSENCE OF BOTH CERVIX AND UTERUS: Chronic | ICD-10-CM

## 2023-05-22 DIAGNOSIS — Z98.890 OTHER SPECIFIED POSTPROCEDURAL STATES: Chronic | ICD-10-CM

## 2023-05-22 DIAGNOSIS — Z98.49 CATARACT EXTRACTION STATUS, UNSPECIFIED EYE: Chronic | ICD-10-CM

## 2023-05-22 LAB
ANION GAP SERPL CALC-SCNC: 12 MMOL/L — SIGNIFICANT CHANGE UP (ref 7–14)
BUN SERPL-MCNC: 10 MG/DL — SIGNIFICANT CHANGE UP (ref 7–23)
CALCIUM SERPL-MCNC: 9 MG/DL — SIGNIFICANT CHANGE UP (ref 8.4–10.5)
CHLORIDE SERPL-SCNC: 103 MMOL/L — SIGNIFICANT CHANGE UP (ref 98–107)
CO2 SERPL-SCNC: 23 MMOL/L — SIGNIFICANT CHANGE UP (ref 22–31)
CREAT SERPL-MCNC: 0.69 MG/DL — SIGNIFICANT CHANGE UP (ref 0.5–1.3)
EGFR: 97 ML/MIN/1.73M2 — SIGNIFICANT CHANGE UP
GLUCOSE BLDC GLUCOMTR-MCNC: 116 MG/DL — HIGH (ref 70–99)
GLUCOSE BLDC GLUCOMTR-MCNC: 117 MG/DL — HIGH (ref 70–99)
GLUCOSE BLDC GLUCOMTR-MCNC: 127 MG/DL — HIGH (ref 70–99)
GLUCOSE BLDC GLUCOMTR-MCNC: 138 MG/DL — HIGH (ref 70–99)
GLUCOSE BLDC GLUCOMTR-MCNC: 85 MG/DL — SIGNIFICANT CHANGE UP (ref 70–99)
GLUCOSE SERPL-MCNC: 110 MG/DL — HIGH (ref 70–99)
HCT VFR BLD CALC: 33.3 % — LOW (ref 34.5–45)
HGB BLD-MCNC: 10.9 G/DL — LOW (ref 11.5–15.5)
MCHC RBC-ENTMCNC: 29.2 PG — SIGNIFICANT CHANGE UP (ref 27–34)
MCHC RBC-ENTMCNC: 32.7 GM/DL — SIGNIFICANT CHANGE UP (ref 32–36)
MCV RBC AUTO: 89.3 FL — SIGNIFICANT CHANGE UP (ref 80–100)
NRBC # BLD: 0 /100 WBCS — SIGNIFICANT CHANGE UP (ref 0–0)
NRBC # FLD: 0 K/UL — SIGNIFICANT CHANGE UP (ref 0–0)
PLATELET # BLD AUTO: 267 K/UL — SIGNIFICANT CHANGE UP (ref 150–400)
POTASSIUM SERPL-MCNC: 4.1 MMOL/L — SIGNIFICANT CHANGE UP (ref 3.5–5.3)
POTASSIUM SERPL-SCNC: 4.1 MMOL/L — SIGNIFICANT CHANGE UP (ref 3.5–5.3)
RBC # BLD: 3.73 M/UL — LOW (ref 3.8–5.2)
RBC # FLD: 12.9 % — SIGNIFICANT CHANGE UP (ref 10.3–14.5)
RH IG SCN BLD-IMP: POSITIVE — SIGNIFICANT CHANGE UP
SODIUM SERPL-SCNC: 138 MMOL/L — SIGNIFICANT CHANGE UP (ref 135–145)
WBC # BLD: 7.28 K/UL — SIGNIFICANT CHANGE UP (ref 3.8–10.5)
WBC # FLD AUTO: 7.28 K/UL — SIGNIFICANT CHANGE UP (ref 3.8–10.5)

## 2023-05-22 PROCEDURE — 73560 X-RAY EXAM OF KNEE 1 OR 2: CPT | Mod: 26,LT

## 2023-05-22 DEVICE — MAKO BONE PIN 3.2MM X 140MM: Type: IMPLANTABLE DEVICE | Site: LEFT | Status: FUNCTIONAL

## 2023-05-22 DEVICE — COMP FEM TRIATHLON PS SZ 4 LT: Type: IMPLANTABLE DEVICE | Site: LEFT | Status: FUNCTIONAL

## 2023-05-22 DEVICE — PIN PINABALL PRELOADABLE 90MMX3.2MM: Type: IMPLANTABLE DEVICE | Site: LEFT | Status: FUNCTIONAL

## 2023-05-22 DEVICE — INSERT TIB BEARING PS X3 SZ 3 9MM STRL: Type: IMPLANTABLE DEVICE | Site: LEFT | Status: FUNCTIONAL

## 2023-05-22 DEVICE — MAKO BONE PIN 3.2MM X 110MM: Type: IMPLANTABLE DEVICE | Site: LEFT | Status: FUNCTIONAL

## 2023-05-22 DEVICE — COMP PATELLA ASYMMETRIC X3 32X10MM: Type: IMPLANTABLE DEVICE | Site: LEFT | Status: FUNCTIONAL

## 2023-05-22 DEVICE — CEMENT SIMPLEX P 40GM: Type: IMPLANTABLE DEVICE | Site: LEFT | Status: FUNCTIONAL

## 2023-05-22 DEVICE — BASEPLATE TIB UNIV TRIATHLON SZ 3: Type: IMPLANTABLE DEVICE | Site: LEFT | Status: FUNCTIONAL

## 2023-05-22 DEVICE — FEM DIST FIXATION PEG MOD TKS: Type: IMPLANTABLE DEVICE | Site: LEFT | Status: FUNCTIONAL

## 2023-05-22 RX ORDER — SODIUM CHLORIDE 9 MG/ML
1000 INJECTION, SOLUTION INTRAVENOUS
Refills: 0 | Status: DISCONTINUED | OUTPATIENT
Start: 2023-05-22 | End: 2023-05-24

## 2023-05-22 RX ORDER — TRAMADOL HYDROCHLORIDE 50 MG/1
50 TABLET ORAL ONCE
Refills: 0 | Status: DISCONTINUED | OUTPATIENT
Start: 2023-05-22 | End: 2023-05-22

## 2023-05-22 RX ORDER — CEFAZOLIN SODIUM 1 G
2000 VIAL (EA) INJECTION EVERY 8 HOURS
Refills: 0 | Status: COMPLETED | OUTPATIENT
Start: 2023-05-22 | End: 2023-05-22

## 2023-05-22 RX ORDER — ONDANSETRON 8 MG/1
4 TABLET, FILM COATED ORAL EVERY 6 HOURS
Refills: 0 | Status: DISCONTINUED | OUTPATIENT
Start: 2023-05-22 | End: 2023-05-24

## 2023-05-22 RX ORDER — INSULIN LISPRO 100/ML
VIAL (ML) SUBCUTANEOUS
Refills: 0 | Status: DISCONTINUED | OUTPATIENT
Start: 2023-05-22 | End: 2023-05-24

## 2023-05-22 RX ORDER — KETOROLAC TROMETHAMINE 30 MG/ML
15 SYRINGE (ML) INJECTION EVERY 6 HOURS
Refills: 0 | Status: DISCONTINUED | OUTPATIENT
Start: 2023-05-22 | End: 2023-05-23

## 2023-05-22 RX ORDER — DEXTROSE 50 % IN WATER 50 %
15 SYRINGE (ML) INTRAVENOUS ONCE
Refills: 0 | Status: DISCONTINUED | OUTPATIENT
Start: 2023-05-22 | End: 2023-05-24

## 2023-05-22 RX ORDER — TRAMADOL HYDROCHLORIDE 50 MG/1
50 TABLET ORAL EVERY 6 HOURS
Refills: 0 | Status: DISCONTINUED | OUTPATIENT
Start: 2023-05-22 | End: 2023-05-24

## 2023-05-22 RX ORDER — SENNA PLUS 8.6 MG/1
2 TABLET ORAL AT BEDTIME
Refills: 0 | Status: DISCONTINUED | OUTPATIENT
Start: 2023-05-22 | End: 2023-05-24

## 2023-05-22 RX ORDER — ACETAMINOPHEN 500 MG
1000 TABLET ORAL ONCE
Refills: 0 | Status: COMPLETED | OUTPATIENT
Start: 2023-05-22 | End: 2023-05-22

## 2023-05-22 RX ORDER — DEXTROSE 50 % IN WATER 50 %
25 SYRINGE (ML) INTRAVENOUS ONCE
Refills: 0 | Status: DISCONTINUED | OUTPATIENT
Start: 2023-05-22 | End: 2023-05-24

## 2023-05-22 RX ORDER — OXYCODONE HYDROCHLORIDE 5 MG/1
5 TABLET ORAL EVERY 4 HOURS
Refills: 0 | Status: DISCONTINUED | OUTPATIENT
Start: 2023-05-22 | End: 2023-05-24

## 2023-05-22 RX ORDER — PANTOPRAZOLE SODIUM 20 MG/1
40 TABLET, DELAYED RELEASE ORAL
Refills: 0 | Status: DISCONTINUED | OUTPATIENT
Start: 2023-05-22 | End: 2023-05-24

## 2023-05-22 RX ORDER — CHLORHEXIDINE GLUCONATE 213 G/1000ML
1 SOLUTION TOPICAL ONCE
Refills: 0 | Status: COMPLETED | OUTPATIENT
Start: 2023-05-22 | End: 2023-05-22

## 2023-05-22 RX ORDER — ACETAMINOPHEN 500 MG
1000 TABLET ORAL ONCE
Refills: 0 | Status: DISCONTINUED | OUTPATIENT
Start: 2023-05-22 | End: 2023-05-24

## 2023-05-22 RX ORDER — ASPIRIN/CALCIUM CARB/MAGNESIUM 324 MG
325 TABLET ORAL
Refills: 0 | Status: DISCONTINUED | OUTPATIENT
Start: 2023-05-22 | End: 2023-05-24

## 2023-05-22 RX ORDER — POLYETHYLENE GLYCOL 3350 17 G/17G
17 POWDER, FOR SOLUTION ORAL AT BEDTIME
Refills: 0 | Status: DISCONTINUED | OUTPATIENT
Start: 2023-05-22 | End: 2023-05-24

## 2023-05-22 RX ORDER — DEXTROSE 50 % IN WATER 50 %
12.5 SYRINGE (ML) INTRAVENOUS ONCE
Refills: 0 | Status: DISCONTINUED | OUTPATIENT
Start: 2023-05-22 | End: 2023-05-24

## 2023-05-22 RX ORDER — MAGNESIUM HYDROXIDE 400 MG/1
30 TABLET, CHEWABLE ORAL DAILY
Refills: 0 | Status: DISCONTINUED | OUTPATIENT
Start: 2023-05-22 | End: 2023-05-24

## 2023-05-22 RX ORDER — SODIUM CHLORIDE 9 MG/ML
500 INJECTION, SOLUTION INTRAVENOUS ONCE
Refills: 0 | Status: COMPLETED | OUTPATIENT
Start: 2023-05-22 | End: 2023-05-22

## 2023-05-22 RX ORDER — CELECOXIB 200 MG/1
200 CAPSULE ORAL
Refills: 0 | Status: DISCONTINUED | OUTPATIENT
Start: 2023-05-23 | End: 2023-05-24

## 2023-05-22 RX ORDER — OXYCODONE HYDROCHLORIDE 5 MG/1
10 TABLET ORAL EVERY 4 HOURS
Refills: 0 | Status: DISCONTINUED | OUTPATIENT
Start: 2023-05-22 | End: 2023-05-24

## 2023-05-22 RX ORDER — INSULIN LISPRO 100/ML
VIAL (ML) SUBCUTANEOUS AT BEDTIME
Refills: 0 | Status: DISCONTINUED | OUTPATIENT
Start: 2023-05-22 | End: 2023-05-24

## 2023-05-22 RX ORDER — SODIUM CHLORIDE 9 MG/ML
500 INJECTION, SOLUTION INTRAVENOUS ONCE
Refills: 0 | Status: COMPLETED | OUTPATIENT
Start: 2023-05-22 | End: 2023-05-23

## 2023-05-22 RX ORDER — PANTOPRAZOLE SODIUM 20 MG/1
40 TABLET, DELAYED RELEASE ORAL ONCE
Refills: 0 | Status: COMPLETED | OUTPATIENT
Start: 2023-05-22 | End: 2023-05-22

## 2023-05-22 RX ORDER — ATORVASTATIN CALCIUM 80 MG/1
10 TABLET, FILM COATED ORAL AT BEDTIME
Refills: 0 | Status: DISCONTINUED | OUTPATIENT
Start: 2023-05-22 | End: 2023-05-24

## 2023-05-22 RX ORDER — GLUCAGON INJECTION, SOLUTION 0.5 MG/.1ML
1 INJECTION, SOLUTION SUBCUTANEOUS ONCE
Refills: 0 | Status: DISCONTINUED | OUTPATIENT
Start: 2023-05-22 | End: 2023-05-24

## 2023-05-22 RX ORDER — FOLIC ACID 0.8 MG
1 TABLET ORAL DAILY
Refills: 0 | Status: DISCONTINUED | OUTPATIENT
Start: 2023-05-22 | End: 2023-05-24

## 2023-05-22 RX ORDER — LOSARTAN POTASSIUM 100 MG/1
50 TABLET, FILM COATED ORAL DAILY
Refills: 0 | Status: DISCONTINUED | OUTPATIENT
Start: 2023-05-22 | End: 2023-05-24

## 2023-05-22 RX ADMIN — Medication 100 MILLIGRAM(S): at 23:08

## 2023-05-22 RX ADMIN — POLYETHYLENE GLYCOL 3350 17 GRAM(S): 17 POWDER, FOR SOLUTION ORAL at 21:46

## 2023-05-22 RX ADMIN — Medication 400 MILLIGRAM(S): at 19:33

## 2023-05-22 RX ADMIN — Medication 325 MILLIGRAM(S): at 17:34

## 2023-05-22 RX ADMIN — PANTOPRAZOLE SODIUM 40 MILLIGRAM(S): 20 TABLET, DELAYED RELEASE ORAL at 07:35

## 2023-05-22 RX ADMIN — Medication 15 MILLIGRAM(S): at 23:59

## 2023-05-22 RX ADMIN — SODIUM CHLORIDE 500 MILLILITER(S): 9 INJECTION, SOLUTION INTRAVENOUS at 14:55

## 2023-05-22 RX ADMIN — Medication 15 MILLIGRAM(S): at 17:35

## 2023-05-22 RX ADMIN — SODIUM CHLORIDE 3 MILLILITER(S): 9 INJECTION INTRAMUSCULAR; INTRAVENOUS; SUBCUTANEOUS at 15:00

## 2023-05-22 RX ADMIN — TRAMADOL HYDROCHLORIDE 50 MILLIGRAM(S): 50 TABLET ORAL at 07:36

## 2023-05-22 RX ADMIN — SODIUM CHLORIDE 500 MILLILITER(S): 9 INJECTION, SOLUTION INTRAVENOUS at 12:11

## 2023-05-22 RX ADMIN — Medication 100 MILLIGRAM(S): at 15:35

## 2023-05-22 RX ADMIN — Medication 15 MILLIGRAM(S): at 23:07

## 2023-05-22 RX ADMIN — SENNA PLUS 2 TABLET(S): 8.6 TABLET ORAL at 21:46

## 2023-05-22 RX ADMIN — CHLORHEXIDINE GLUCONATE 1 APPLICATION(S): 213 SOLUTION TOPICAL at 07:37

## 2023-05-22 RX ADMIN — SODIUM CHLORIDE 3 MILLILITER(S): 9 INJECTION INTRAMUSCULAR; INTRAVENOUS; SUBCUTANEOUS at 21:59

## 2023-05-22 NOTE — DISCHARGE NOTE PROVIDER - NSDCFUSCHEDAPPT_GEN_ALL_CORE_FT
Jose Antonio Glover  [facility] Physician [facility]  ORTHOSUR [address] [facility]  Scheduled Appointment: 06/06/2023

## 2023-05-22 NOTE — PATIENT PROFILE ADULT - NSPROGENOTHERPROVIDER_GEN_A_NUR
Pulmonary Critical Care Progress Note        Date of admission: 2/18/2018    Chief Complaint: Respiratory failure, septic shock    History of Present Illness:  Mr. Edwards is a 36-year-old male with past medical history of traumatic brain injury at age 16, seizures diagnosed in June of 2017. His baseline mental status appears to be nodding and minimal interacting without vocalization. Who was brought to the ICU as a direct admit from [facility] where he was brought today for hypoxia. Reportedly the patient was being weanied off of his phenobarbital by his neurologist when he began having more frequent seizures, approximately one week ago his neurologist increased his Keppra dosing however the seizures continued. His mother did use CBD and THC with some success in slowing the seizures. Since 2 days ago the patient had multiple aspiration events following these seizures. His mother evaluated him with a home oxygen saturation monitor and he was noted to be hypoxic, he was brought to [facility] where he was found to have 28% bands with leukopenia. Chest x-ray was obtained demonstrating bilateral infiltrates right greater than left. He was placed on mechanical ventilation and his oxygenation was unable to be improved more than 85%, he was hypotensive and given multiple boluses of IV fluids without improvement. He was started on levo fed and given multiple doses of Ativan for seizures and transferred to our ICU for higher level of care. He arrives on the ventilator and on pressors critically ill.      ROS:  Respiratory: unable to perform due to the patient's inability to effectively communicate, Cardiac: unable to perform due to the patient's inability to effectively communicate, GI: unable to perform due to the patient's inability to effectively communicate.  All other systems negative. Unchanged    Interval Events:  24 hour interval history reviewed    - SR/ST   - continuous EEG   - Tm 100.4   -  neuro unchanged   - LP today   - T-piece; off vent since 3/4, 30%   - replace K and Mg   - VPA added  Yesterday:   - sz last night ~11:45 PM, L arm/head sz activity; given ativan 2 mg   - april TF   - TLC   - flotation/rotation protocol   - T-piece 30%, no cxr today   - lovenox, PPI   - remains off abx   - replacing K, IV and PO   - d/c midodrine today         PFSH:  No change.    Respiratory:      Pulse Oximetry: 98 %           Exam: tracheostomy tube in good position without surrounding erythema, unlabored respirations, no intercostal retractions or accessory muscle use and rhonchi bibasilar.   ImagingAvailable data reviewed (compared to prior films)        Invalid input(s): [id number]     HemoDynamics:  Pulse: 91, Heart Rate (Monitored): 91  NIBP: 115/59        Exam: regular rate and rhythm, regular rhythm (Sinus), no ectopy          Neuro:  GCS Total Aleida Coma Score: 9   Exam: PERRL, eyes are open, does not follow commands, intermittent sz's noted  Imaging: Available data reviewed       Fluids:  Intake/Output       03/06/18 0700 - 03/07/18 0659 03/07/18 0700 - 03/08/18 0659 03/08/18 0700 - 03/09/18 0659      [id number] 9674-5916 Total 3337-1741 0746-0766 Total 3260-6823 7539-8134 Total       Intake    I.V.  --  -- --  --  50 50  --  -- --    IV Piggyback Volume (IV Piggyback) -- -- -- -- 50 50 -- -- --    Other  150  45 195  60  45 105  --  -- --    Medications (P.O./ Enteral Liquids) 150 45 195 60 45 105 -- -- --    Enteral  1500  260 1760  1620  30 1650  --  -- --    Enteral Volume 5079 424 8617 1440 -- 1440 -- -- --    Free Water / Tube Flush 180 60 240 180 30 210 -- -- --    Total Intake 3727 306 0656 7252 595 3686 -- -- --       Output    Urine  755  925 1680  975  855 1830  --  -- --    Indwelling Cathether   -- -- --    Drains  --  -- --  0  -- 0  --  -- --    Residual Amount (ml) (Discarded) -- -- -- 0 -- 0 -- -- --    Stool  --  -- --  --  -- --  --  -- --    Number of Times  Stooled 1 x -- 1 x 1 x -- 1 x -- -- --    Total Output   -- -- --       Net I/O     895 -336 422 706 -730 -25 -- -- --        Weight: 55.2 kg (121 lb 11.1 oz)  Recent Labs      18   0507  18   0520  18   0537   SODIUM  137  134*  134*   POTASSIUM  3.4*  3.4*  3.8   CHLORIDE  103  100  101   CO2  27  26  26   BUN  10  11  12   CREATININE  <0.20*  <0.20*  <0.20*   MAGNESIUM   --    --   1.8   CALCIUM  8.9  8.5  8.6       GI/Nutrition:  Exam: nondistended, abdomen is soft and non-tender, normal active bowel sounds, G-Tube  no sign of infection, palpable baclofen pump in right lower quadrant   Imaging: Available data reviewed    KUB : No evidence of bowel obstruction.   CT abdomen and pelvis with oral contrast :  1.  No evidence of bowel obstruction or perforation.  2.  Appendix not visualized.  3.  Small to moderate volume ascites of uncertain etiology.  4.  Bilateral pleural effusions with associated atelectasis.  5.  Ill-defined parenchymal opacities in the LEFT lower lung suggesting multifocal pneumonia.  6.  Scoliosis.    Liver Function  Recent Labs      18   05018   0520  18   0537   GLUCOSE  90  91  90       Heme:  Recent Labs      18   05018   0520  18   0537   RBC  3.08*  3.07*  3.09*   HEMOGLOBIN  9.0*  8.9*  9.1*   HEMATOCRIT  28.9*  28.8*  28.5*   PLATELETCT  585*  550*  508*       Infectious Disease:  Monitored Temp  Av.2 °C (98.9 °F)  Min: 35.7 °C (96.3 °F)  Max: 37.8 °C (100 °F)  Micro: reviewed.   Recent Labs      18   0507  18   0520  18   0537   WBC  6.5  6.8  7.4   NEUTSPOLYS  53.90  53.10  57.00   LYMPHOCYTES  24.50  25.30  22.70   MONOCYTES  15.80*  15.50*  14.40*   EOSINOPHILS  4.30  4.80  3.90   BASOPHILS  0.90  0.90  1.20     Current Facility-Administered Medications   Medication Dose Frequency Provider Last Rate Last Dose   • valproate (DEPACON) 500 mg in D5W 50 mL IVPB  500 mg BID  Harris Bolton M.D.   Stopped at 03/07/18 2143   • PHENobarbital solution 120 mg  120 mg BID Aidan Encarnacion M.D.   120 mg at 03/07/18 2044   • potassium bicarbonate (KLYTE) 25 MEQ effervescent tablet TBEF 50 mEq  50 mEq DAILY Jeremy M Gonda, M.D.   50 mEq at 03/07/18 0829   • Pain Pump (patient supplied) Device   Continuous Jeremy M Gonda, M.D.       • enoxaparin (LOVENOX) inj 40 mg  40 mg DAILY Jeremy M Gonda, M.D.   40 mg at 03/07/18 0830   • lacosamide (VIMPAT) tablet 200 mg  200 mg BID Jeremy M Gonda, M.D.   200 mg at 03/07/18 2043   • levETIRAcetam (KEPPRA) 100 MG/ML solution 1,500 mg  1,500 mg Q12HRS Jeremy M Gonda, M.D.   1,500 mg at 03/07/18 2045   • loperamide (IMODIUM) 1 MG/5ML solution 2 mg  2 mg 4X/DAY PRN FARHAT MedinaOBucky   2 mg at 02/26/18 0820   • omeprazole 2 mg/mL in sodium bicarbonate (PRILOSEC) oral susp 40 mg  40 mg DAILY Jon Reynoso D.O.   40 mg at 03/07/18 0830   • levalbuterol (XOPENEX) 1.25 MG/3ML nebulizer solution 1.25 mg  1.25 mg Q6HRS (RT) Jon Reynoso D.O.   1.25 mg at 03/08/18 0627   • Respiratory Care per Protocol   Continuous RT Modesto Appiah Jr., D.O.       • polyethylene glycol/lytes (MIRALAX) PACKET 1 Packet  1 Packet QDAY PRN Modesto Appiah Jr. D.O.   1 Packet at 03/02/18 1527    And   • magnesium hydroxide (MILK OF MAGNESIA) suspension 30 mL  30 mL QDAY PRN Modesto Appiah Jr., D.O.        And   • bisacodyl (DULCOLAX) suppository 10 mg  10 mg QDAY PRN Modesto Appiah Jr., D.O.   Stopped at 03/06/18 1026   • chlorhexidine (PERIDEX) 0.12 % solution 15 mL  15 mL BID JASPAL Alaniz Jr..O.   15 mL at 03/07/18 2045   • lidocaine (XYLOCAINE) 1%  injection  1-2 mL Q30 MIN PRN JASPAL Alaniz Jr..O.       • MD ALERT...Adult ICU Electrolyte Replacement per Pharmacy Protocol   pharmacy to dose JASPAL Alaniz Jr..SAM.       • ipratropium-albuterol (DUONEB) nebulizer solution 3 mL  3 mL Q2HRS PRN (RT) JASPAL Alaniz Jr..O.       • levalbuterol (XOPENEX) 1.25  MG/3ML nebulizer solution 1.25 mg  1.25 mg Q4HRS PRN Duy Whitaker M.D.   1.25 mg at 03/06/18 1420   • miconazole 2%-zinc oxide (RADHA) topical cream   PRN Duy Whitaker M.D.       • acetaminophen (TYLENOL) tablet 650 mg  650 mg Q6HRS PRN Duy Whitaker M.D.   650 mg at 03/05/18 1515   • oxyCODONE immediate-release (ROXICODONE) tablet 5 mg  5 mg Q3HRS PRN Duy Whitaker M.D.        And   • oxyCODONE immediate release (ROXICODONE) tablet 10 mg  10 mg Q3HRS PRN Duy Whitaker M.D.       • ondansetron (ZOFRAN) syringe/vial injection 4 mg  4 mg Q4HRS PRN Duy Whitaker M.D.   4 mg at 02/25/18 1140   • ondansetron (ZOFRAN ODT) dispertab 4 mg  4 mg Q4HRS PRN Duy Whitaker M.D.       • promethazine (PHENERGAN) tablet 12.5-25 mg  12.5-25 mg Q4HRS PRN Duy Whitaker M.D.       • promethazine (PHENERGAN) suppository 12.5-25 mg  12.5-25 mg Q4HRS PRN Duy Whitaker M.D.       • prochlorperazine (COMPAZINE) injection 5-10 mg  5-10 mg Q4HRS PRN Duy Whitaker M.D.       • LORazepam (ATIVAN) injection 4 mg  4 mg Q10 MIN PRN Duy Whitaker M.D.   2 mg at 03/07/18 1054     Last reviewed on 2/18/2018  7:51 PM by Lois Camejo, Pharmacy Intern    Quality  Measures:  Labs reviewed, Medications reviewed and Radiology images reviewed                    Assessment and plan:  Acute hypoxemic respiratory failure - VDRF since 2/18, chronic trach   - T-piece    - RT/O2 protocol, encourage mobilization   - minimize sedatives and maintain aspiration precautions   - intermittent chest x-rays  Left exudative pleural effusion per lytes criteria S/P thoracentesis 3/3   - cx neg  Septic shock from pulmonary source, now unspecified place a distributive shock - improved   - midodrine held, d/c today   - Completed antibiotics, s/p sepsis protocol   - citrobacter suspected colonizer   History of traumatic brain injury, chronic seizure disorder with associated status epilepticus   - Continue antiepileptics per neurology   - ongoing  seizure activity   - appreciated Neuro input   - MRI noted; MRA pending   - NH3 up with VPA, stopped, mom doesn't want lactulose   - awaiting LP to performed by IR (severe scoliosis)  Decubitus ulcerations of the left hip including stage IV disease   - Wound care, optimize nutrition  Pneumonia (Proteus and Klebsiella)   - S/P antibiotics  Mild fluid overload - improved   - Discontinue Lasix and monitor strict I/O   Possible paralytic ileus versus feeding tube malfunction - resolved   - Continue tube feeding trial with advanced to goal home regimen   - Bowel protocol, daily Dulcolax suppository  Hyponatremia    - follow  Iron deficiency anemia - s/p iron supplementation  Hypokalemia/mag - repletion daily as needed  Prophylaxis, enteral nutrition    I discussed the case with neurology in detail today. I will repeat brain imaging with an MRI of the brain. Further EEG likely to include continuous EEG monitoring and further decision regarding antiepileptic therapy per neurology.    Discussed patient condition and risk of morbidity and/or mortality with Family, RN, RT, Pharmacy, Charge nurse / hot rounds and hospitalist.    The patient does remain critically ill. I have managed and ongoing respiratory support, critical care and re-assessed throughout the day.  The patient remains critically ill.  Critical care time = 31 minutes in directly providing and coordinating critical care and extensive data review.  No time overlap and excludes procedures.    Eleazar Grijalva M.D.   none

## 2023-05-22 NOTE — PHYSICAL THERAPY INITIAL EVALUATION ADULT - FOLLOWS COMMANDS/ANSWERS QUESTIONS, REHAB EVAL
How Severe Is Your Skin Lesion?: mild Have Your Skin Lesions Been Treated?: been treated Is This A New Presentation, Or A Follow-Up?: Skin Lesions Additional History: Patient reports small, raised bumps on both cheeks. Patient tried tretinoin to AA but found it too drying. Patient also reports cheeks become redder throughout the course of the day. Patient currently uses gentle face products 100% of the time

## 2023-05-22 NOTE — DISCHARGE NOTE PROVIDER - NSDCCPTREATMENT_GEN_ALL_CORE_FT
PRINCIPAL PROCEDURE  Procedure: Arthroplasty, knee, left, total, robot-assisted  Findings and Treatment: Pain control:    Standing:         -Acetaminophen 500mg - 2 tabs every 8 hours  As needed:        -Tramadol 50mg - 1 tab every 6 hours - Take only if needed for MODERATE pain       -oxycodone 5mg - 1 tab every 4-6 hours - Take only if needed for SEVERE or BREAKTHROUGH pain  Oxycodone and Tramadol have been sent to your pharmacy. Please do not drive, operate machinery, or make important decisions while taking these medications.   Other Medications: (Standing)  -Aspirin (Enteric Coated) 325mg every 12 hours - to prevent blood clots (for 6 weeks post operatively.)  -Protonix 40mg - 1 tab every 24 hours - to prevent stomach irritation/ulcers  -Senna 8.6mg - 2 pills every 24 hours - stool softener  -Miralax 17g - daily - constipation   Follow up: Please follow up at your prescheduled post-operative follow up appointment with Dr. Glover for 2 weeks after hospital discharge. Please call with any questions or concerns including fevers, worsening pain, pus from the wounds, redness of the skin and difficulty breathing or heaviness in the chest at [phone].     PRINCIPAL PROCEDURE  Procedure: Arthroplasty, knee, left, total, robot-assisted  Findings and Treatment: This is a 61yo Female with PMH of HTN, HLD, DM and OA who presents to [facility] for orthopedic surgery. Patient s/p left total knee arthroplasty with Dr. Glover on 5/22/2023. Patient tolerated the procedure well without any intraoperative complications. Patient tolerated physical therapy well, and the pain was controlled. Patient is weight bearing as tolerated with cane/walker as needed. Seen by medical attending for continuity of care and management and cleared for safe discharge. Keep dressing/incision clean, dry and intact. Any suture/staples to be removed on post-op day #14 at your office visit. Patient is on 325mg of ASA for DVT prophylaxis, please take for 6 weeks unless otherwise instructed by your surgeon. Please follow up with Dr. Glover in 2 weeks. Please follow up with your PMD for continuity of care and management as medications may have changed.

## 2023-05-22 NOTE — DISCHARGE NOTE PROVIDER - PROVIDER TOKENS
PROVIDER:[TOKEN:[[id number]],ESTABLISHEDPATIENT:[T]] PROVIDER:[TOKEN:[[id number]],FOLLOWUP:[2 weeks]]

## 2023-05-22 NOTE — DISCHARGE NOTE PROVIDER - NSDCCPCAREPLAN_GEN_ALL_CORE_FT
PRINCIPAL DISCHARGE DIAGNOSIS  Diagnosis: Osteoarthritis of left knee  Assessment and Plan of Treatment: Diet: Continue regular diet upon discharge.   Activity: No heavy lifting > 25 lbs for 4 weeks. Avoid straining or excessive activity x 6 weeks.   -Continue to use your walker when ambulating until your postoperative follow up appointment.   Dressings: Keep dressing clean, dry, and intact. Your doctor will remove your bandage at your post-operative follow up appointment.   Other Care:   -You may shower when you get home but DO NOT soak dressing and/or incision. The water may run over your dressing/incision but DO NOT let the water directly hit your dressing/incision (take a shower with your wound away from the direct stream of water). NO hot tubes, NO bath rubs, NO swimming pools.   -Elevate your operative leg 2 feet above heart level for 2 hours in the morning, 2 hours in the afternoon, and 2 hours in the evening.   -Apply ice for 20min every time you elevate.   -Sit for 90 min/day: 45mins x2 or 30min x3  -DO NOT sit for more than the 90min/day. Walk or lay down when not elevating your leg.   -DO NOT place the elevation pillow behind your knees. Only place it under your calf and heel.   -DO NOT bend more than 45 degrees at the waist       PRINCIPAL DISCHARGE DIAGNOSIS  Diagnosis: Osteoarthritis of left knee  Assessment and Plan of Treatment:

## 2023-05-22 NOTE — ASU PREOP CHECKLIST - SELECT TESTS ORDERED
fsbs/BMP/CBC/Type and Cross/Type and Screen/EKG/POCT Blood Glucose fsbs85 at 7:11am/BMP/CBC/Type and Cross/Type and Screen/EKG/POCT Blood Glucose

## 2023-05-22 NOTE — PHYSICAL THERAPY INITIAL EVALUATION ADULT - PATIENT PROFILE REVIEW, REHAB EVAL
ACTIVITY: OOB to Chair; spoke with covering BREE Quezada for BREE VILLAGOMEZ prior to PT evaluation--> Pt OK for PT consult/OOB activity; Vitals taken; /63mmHg, Heart rate 70bpm/yes

## 2023-05-22 NOTE — PATIENT PROFILE ADULT - FALL HARM RISK - HARM RISK INTERVENTIONS
Assistance with ambulation/Assistance OOB with selected safe patient handling equipment/Communicate Risk of Fall with Harm to all staff/Discuss with provider need for PT consult/Monitor gait and stability/Provide patient with walking aids - walker, cane, crutches/Reinforce activity limits and safety measures with patient and family/Sit up slowly, dangle for a short time, stand at bedside before walking/Tailored Fall Risk Interventions/Use of alarms - bed, chair and/or voice tab/Visual Cue: Yellow wristband and red socks/Bed in lowest position, wheels locked, appropriate side rails in place/Call bell, personal items and telephone in reach/Instruct patient to call for assistance before getting out of bed or chair/Non-slip footwear when patient is out of bed/[place] to call system/Physically safe environment - no spills, clutter or unnecessary equipment/Purposeful Proactive Rounding/Room/bathroom lighting operational, light cord in reach

## 2023-05-22 NOTE — PHYSICAL THERAPY INITIAL EVALUATION ADULT - PERTINENT HX OF CURRENT PROBLEM, REHAB EVAL
Pt is a 62 year old female with PMHx DM, HLD, HTN, hysterectomy,  and cholecystectomy that presents to [facility] for elective Left Total Knee Replacement.

## 2023-05-22 NOTE — PHYSICAL THERAPY INITIAL EVALUATION ADULT - DIAGNOSIS, PT EVAL
Pt s/p Left Total Knee Replacement on 05/22/2023; pt presents with decreased strength and decreased balance.

## 2023-05-22 NOTE — DISCHARGE NOTE PROVIDER - NSDCMRMEDTOKEN_GEN_ALL_CORE_FT
aspirin 81 mg oral tablet: orally once a day  atorvastatin 10 mg oral tablet: 1 orally once a day  CeleBREX 200 mg oral capsule: 1 orally once a day  folic acid: 1 tab orally once a day  ibuprofen 600 mg oral tablet: 1 orally every 8 hours as needed for  moderate pain  losartan-hydrochlorothiazide 50 mg-12.5 mg oral tablet: 1 orally once a day  tiZANidine 2 mg oral capsule: 2 orally every 6 hours PRN  Trulicity Pen 1.5 mg/0.5 mL subcutaneous solution: 1.5 subcutaneously once a week   aspirin 325 mg oral tablet: 1 tab(s) orally 2 times a day for 6 weeks  atorvastatin 10 mg oral tablet: 1 orally once a day  CeleBREX 200 mg oral capsule: 1 orally once a day  folic acid: 1 tab orally once a day  losartan-hydrochlorothiazide 50 mg-12.5 mg oral tablet: 1 orally once a day  Multiple Vitamins oral tablet: 1 tab(s) orally once a day  oxyCODONE 5 mg oral tablet: 1 tab(s) orally every 4 hours as needed for Moderate-Severe Pain  pantoprazole 40 mg oral delayed release tablet: 1 tab(s) orally once a day (before a meal)  polyethylene glycol 3350 oral powder for reconstitution: 17 gram(s) orally once a day (at bedtime)  senna leaf extract oral tablet: 2 tab(s) orally once a day (at bedtime)  tiZANidine 2 mg oral capsule: 2 orally every 6 hours PRN  traMADol 50 mg oral tablet: 1 tab(s) orally every 6 hours As needed Mild Pain (1 - 3)

## 2023-05-22 NOTE — OCCUPATIONAL THERAPY INITIAL EVALUATION ADULT - LIVES WITH, PROFILE
daughter in a home with steps to manage./children son and daughter-in-law in a home with steps to manage./children

## 2023-05-22 NOTE — PHYSICAL THERAPY INITIAL EVALUATION ADULT - GENERAL OBSERVATIONS, REHAB EVAL
Pt encountered in semisupine position, no distress, AxOx4, with +IV, left knee dressing dry/intact, and daughter in law @bedside. Pt agreeable to participate in PT evaluation.

## 2023-05-22 NOTE — ASU PREOP CHECKLIST - PATIENT'S PERSONAL PROPERTY REMOVED
braceletes removed/jewelry bracelets removed/jewelry bracelets removed given to daughter in law/jewelry

## 2023-05-22 NOTE — OCCUPATIONAL THERAPY INITIAL EVALUATION ADULT - PERTINENT HX OF CURRENT PROBLEM, REHAB EVAL
Pt is a 62 year old female with PMH DM, HLD, HTN, hysterectomy,  and cholecystectomy that presents to [facility] for elective Left Total Knee Replacement.

## 2023-05-22 NOTE — PHYSICAL THERAPY INITIAL EVALUATION ADULT - ADDITIONAL COMMENTS
Pt reports that she lives in a private house with her son and daughter in law with ~5 steps to enter; (+)bilateral handrails far apart; and a flight of stairs to negotiate to bedroom; (+)handrail. Prior to hospital admission pt was completely independent and used a rolling walker with ambulation. Pt denies any recent falls.    Pt left comfortable in bed, NAD, all lines intact, all precautions maintained, with call bell in reach, daughter in law @bedside, and RN aware.

## 2023-05-22 NOTE — DISCHARGE NOTE PROVIDER - HOSPITAL COURSE
This is a 63yo Female with PMH of HTN, HLD, DM and OA who presents to [facility] for orthopedic surgery. Patient s/p left TKA with Dr. Glover on 5/22/2023. Patient tolerated the procedure well without any intraoperative complications. Patient tolerated physical therapy well, and the pain was controlled. Patient is weight bearing as tolerated with cane/walker as needed. Seen by medical attending for continuity of care and management and cleared for safe discharge. Keep dressing/incision clean, dry and intact. Any suture/staples to be removed on post-op day #14 at your office visit. Patient is on 325mg of ASA for DVT prophylaxis, please take for 6 weeks unless otherwise instructed by your surgeon. Please follow up with Dr. Glover in 2 weeks. Please follow up with your PMD for continuity of care and management as medications may have changed.   This is a 61yo Female with PMH of HTN, HLD, DM and OA who presents to [facility] for orthopedic surgery. Patient s/p left total knee arthroplasty with Dr. Glover on 5/22/2023. Patient tolerated the procedure well without any intraoperative complications. Patient tolerated physical therapy well, and the pain was controlled. Patient is weight bearing as tolerated with cane/walker as needed. Seen by medical attending for continuity of care and management and cleared for safe discharge. Keep dressing/incision clean, dry and intact. Any suture/staples to be removed on post-op day #14 at your office visit. Patient is on 325mg of ASA for DVT prophylaxis, please take for 6 weeks unless otherwise instructed by your surgeon. Please follow up with Dr. Glover in 2 weeks. Please follow up with your PMD for continuity of care and management as medications may have changed.

## 2023-05-22 NOTE — DISCHARGE NOTE PROVIDER - CARE PROVIDER_API CALL
Jose Antonio Glover)  Orthopedics  [address] [place] [address]  Phone: [phone]  Fax: [phone]  Established Patient  Follow Up Time:    Jose Antonio Glover)  Orthopedics  [address] [place] [address]  Phone: [phone]  Fax: [phone]  Follow Up Time: 2 weeks

## 2023-05-22 NOTE — OCCUPATIONAL THERAPY INITIAL EVALUATION ADULT - DIAGNOSIS, OT EVAL
Office has been notified that pt is requiring Prior Authorization for the following medication:  --   fluticasone furoate-vilanterol (BREO ELLIPTA) 200-25 MCG/ACT AEPB inhaler [[id number]      Please initiate this request through CoverMyMeds, contacting the following Payor/Insurance:  -- [facility] medicare       Please see below, or the documentation attached to this encounter for any additional information that may assist in processing PA:  --   N/a    Thank you!    
Please read 03/08/2023 note in the chart. Ned Vela was already done.
s/p left TKR

## 2023-05-23 ENCOUNTER — TRANSCRIPTION ENCOUNTER (OUTPATIENT)
Age: 63
End: 2023-05-23

## 2023-05-23 LAB
ANION GAP SERPL CALC-SCNC: 11 MMOL/L — SIGNIFICANT CHANGE UP (ref 7–14)
BUN SERPL-MCNC: 11 MG/DL — SIGNIFICANT CHANGE UP (ref 7–23)
CALCIUM SERPL-MCNC: 8.3 MG/DL — LOW (ref 8.4–10.5)
CHLORIDE SERPL-SCNC: 104 MMOL/L — SIGNIFICANT CHANGE UP (ref 98–107)
CO2 SERPL-SCNC: 24 MMOL/L — SIGNIFICANT CHANGE UP (ref 22–31)
CREAT SERPL-MCNC: 0.66 MG/DL — SIGNIFICANT CHANGE UP (ref 0.5–1.3)
EGFR: 99 ML/MIN/1.73M2 — SIGNIFICANT CHANGE UP
GLUCOSE BLDC GLUCOMTR-MCNC: 105 MG/DL — HIGH (ref 70–99)
GLUCOSE BLDC GLUCOMTR-MCNC: 105 MG/DL — HIGH (ref 70–99)
GLUCOSE BLDC GLUCOMTR-MCNC: 156 MG/DL — HIGH (ref 70–99)
GLUCOSE BLDC GLUCOMTR-MCNC: 94 MG/DL — SIGNIFICANT CHANGE UP (ref 70–99)
GLUCOSE SERPL-MCNC: 84 MG/DL — SIGNIFICANT CHANGE UP (ref 70–99)
HCT VFR BLD CALC: 26.7 % — LOW (ref 34.5–45)
HGB BLD-MCNC: 9 G/DL — LOW (ref 11.5–15.5)
MCHC RBC-ENTMCNC: 28.9 PG — SIGNIFICANT CHANGE UP (ref 27–34)
MCHC RBC-ENTMCNC: 33.7 GM/DL — SIGNIFICANT CHANGE UP (ref 32–36)
MCV RBC AUTO: 85.9 FL — SIGNIFICANT CHANGE UP (ref 80–100)
NRBC # BLD: 0 /100 WBCS — SIGNIFICANT CHANGE UP (ref 0–0)
NRBC # FLD: 0 K/UL — SIGNIFICANT CHANGE UP (ref 0–0)
PLATELET # BLD AUTO: 249 K/UL — SIGNIFICANT CHANGE UP (ref 150–400)
POTASSIUM SERPL-MCNC: 3.6 MMOL/L — SIGNIFICANT CHANGE UP (ref 3.5–5.3)
POTASSIUM SERPL-SCNC: 3.6 MMOL/L — SIGNIFICANT CHANGE UP (ref 3.5–5.3)
RBC # BLD: 3.11 M/UL — LOW (ref 3.8–5.2)
RBC # FLD: 12.6 % — SIGNIFICANT CHANGE UP (ref 10.3–14.5)
SARS-COV-2 RNA SPEC QL NAA+PROBE: SIGNIFICANT CHANGE UP
SODIUM SERPL-SCNC: 139 MMOL/L — SIGNIFICANT CHANGE UP (ref 135–145)
WBC # BLD: 8.68 K/UL — SIGNIFICANT CHANGE UP (ref 3.8–10.5)
WBC # FLD AUTO: 8.68 K/UL — SIGNIFICANT CHANGE UP (ref 3.8–10.5)

## 2023-05-23 RX ADMIN — OXYCODONE HYDROCHLORIDE 10 MILLIGRAM(S): 5 TABLET ORAL at 22:07

## 2023-05-23 RX ADMIN — OXYCODONE HYDROCHLORIDE 10 MILLIGRAM(S): 5 TABLET ORAL at 10:58

## 2023-05-23 RX ADMIN — ATORVASTATIN CALCIUM 10 MILLIGRAM(S): 80 TABLET, FILM COATED ORAL at 22:09

## 2023-05-23 RX ADMIN — LOSARTAN POTASSIUM 50 MILLIGRAM(S): 100 TABLET, FILM COATED ORAL at 05:40

## 2023-05-23 RX ADMIN — Medication 325 MILLIGRAM(S): at 18:13

## 2023-05-23 RX ADMIN — SODIUM CHLORIDE 3 MILLILITER(S): 9 INJECTION INTRAMUSCULAR; INTRAVENOUS; SUBCUTANEOUS at 13:41

## 2023-05-23 RX ADMIN — OXYCODONE HYDROCHLORIDE 10 MILLIGRAM(S): 5 TABLET ORAL at 17:54

## 2023-05-23 RX ADMIN — OXYCODONE HYDROCHLORIDE 10 MILLIGRAM(S): 5 TABLET ORAL at 16:54

## 2023-05-23 RX ADMIN — Medication 15 MILLIGRAM(S): at 05:51

## 2023-05-23 RX ADMIN — SODIUM CHLORIDE 3 MILLILITER(S): 9 INJECTION INTRAMUSCULAR; INTRAVENOUS; SUBCUTANEOUS at 22:01

## 2023-05-23 RX ADMIN — OXYCODONE HYDROCHLORIDE 10 MILLIGRAM(S): 5 TABLET ORAL at 23:00

## 2023-05-23 RX ADMIN — Medication 325 MILLIGRAM(S): at 05:49

## 2023-05-23 RX ADMIN — Medication 1 TABLET(S): at 12:33

## 2023-05-23 RX ADMIN — Medication 15 MILLIGRAM(S): at 12:33

## 2023-05-23 RX ADMIN — PANTOPRAZOLE SODIUM 40 MILLIGRAM(S): 20 TABLET, DELAYED RELEASE ORAL at 05:40

## 2023-05-23 RX ADMIN — CELECOXIB 200 MILLIGRAM(S): 200 CAPSULE ORAL at 19:00

## 2023-05-23 RX ADMIN — SODIUM CHLORIDE 500 MILLILITER(S): 9 INJECTION, SOLUTION INTRAVENOUS at 05:41

## 2023-05-23 RX ADMIN — Medication 1 MILLIGRAM(S): at 12:33

## 2023-05-23 RX ADMIN — OXYCODONE HYDROCHLORIDE 10 MILLIGRAM(S): 5 TABLET ORAL at 05:40

## 2023-05-23 RX ADMIN — SENNA PLUS 2 TABLET(S): 8.6 TABLET ORAL at 22:08

## 2023-05-23 RX ADMIN — ATORVASTATIN CALCIUM 10 MILLIGRAM(S): 80 TABLET, FILM COATED ORAL at 05:40

## 2023-05-23 RX ADMIN — Medication 15 MILLIGRAM(S): at 12:48

## 2023-05-23 RX ADMIN — POLYETHYLENE GLYCOL 3350 17 GRAM(S): 17 POWDER, FOR SOLUTION ORAL at 22:09

## 2023-05-23 RX ADMIN — OXYCODONE HYDROCHLORIDE 10 MILLIGRAM(S): 5 TABLET ORAL at 11:58

## 2023-05-23 RX ADMIN — OXYCODONE HYDROCHLORIDE 10 MILLIGRAM(S): 5 TABLET ORAL at 06:09

## 2023-05-23 RX ADMIN — SODIUM CHLORIDE 3 MILLILITER(S): 9 INJECTION INTRAMUSCULAR; INTRAVENOUS; SUBCUTANEOUS at 06:09

## 2023-05-23 RX ADMIN — CELECOXIB 200 MILLIGRAM(S): 200 CAPSULE ORAL at 18:13

## 2023-05-23 RX ADMIN — Medication 15 MILLIGRAM(S): at 06:09

## 2023-05-23 NOTE — DISCHARGE NOTE NURSING/CASE MANAGEMENT/SOCIAL WORK - NSDCPECAREGIVERED_GEN_ALL_CORE
carenotes on knee replacement, exercise worksheet, pain after surgery, d/c medications, cold therapy, side effects carenotes on knee replacement, pain after surgery, incision action plan, d/c medications, exercise worksheet, side effects knee replacement  oxy   tramadol  aspirin  caring for my incision  pain management  Cold compression therapy

## 2023-05-23 NOTE — DISCHARGE NOTE NURSING/CASE MANAGEMENT/SOCIAL WORK - NSDPFAC_GEN_ALL_CORE
Prep Survey      Responses   Facility patient discharged from?  [facility]   Is patient eligible?  Yes   Discharge diagnosis  ORIF right humerus   Does the patient have one of the following disease processes/diagnoses(primary or secondary)?  General Surgery   Does the patient have Home health ordered?  No   Is there a DME ordered?  Yes   What DME was ordered?  O2, WC   Comments regarding appointments  call for apmt   Medication alerts for this patient  lopressor, keppra   Prep survey completed?  Yes          Kerry Hunter RN        
[facility]

## 2023-05-23 NOTE — PROGRESS NOTE ADULT - NS ATTEND AMEND GEN_ALL_CORE FT
Patient seen and examined. Gurpreet Specner is a 62 year old female now status post Left Total Knee Arthroplasty. Patient is currently doing well. She walked well with PT yesterday. Discussed patient care with patient's daughter over the patient's phone. Patient's daughter would like to consider rehab placement. Discussed following up with PT recommendations. No chest pain or shortness of breath. No acute overnight events.    Physical Exam:  Dressing: Clean, Dry, Intact  Motor: Intact EHL/FHL/Tibialis Anterior/Gastrocnemius  Sensory: Intact Superficial Peroneal/Deep Peroneal/Saphenous/Sural/Tibial Nerves  Vascular: 2+ DP Pulse    Assessment/Plan:  Gurpreet Spencer is a 62 year old female now status post Left Total Knee Arthroplasty    Plan:  -Left Lower Extremity: Weight Bearing as Tolerated  -PT/OT, Out of Bed  -Pain Control: Per Protocol  -DVT Prophylaxis: Aspirin 325mg twice per day  -Antibiotics: Ancef 2g x24 hours  -Follow up Medicine  -Disposition: Pending

## 2023-05-23 NOTE — DISCHARGE NOTE NURSING/CASE MANAGEMENT/SOCIAL WORK - NSDPDISTO_GEN_ALL_CORE
Home with home care Pt. is afebrile and offers no complaints. In no acute distress. Left knee aquacel dressing: clean, dry and intact. Pt is ambulating with a walker, tolerating diet well, and voiding in adequate amounts./Acute care facility

## 2023-05-23 NOTE — DISCHARGE NOTE NURSING/CASE MANAGEMENT/SOCIAL WORK - PATIENT PORTAL LINK FT
You can access the FollowMyHealth Patient Portal offered by [facility] by registering at the following website: [email or web address]. By joining "[employer]’s FollowMyHealth portal, you will also be able to view your health information using other applications (apps) compatible with our system.

## 2023-05-23 NOTE — DISCHARGE NOTE NURSING/CASE MANAGEMENT/SOCIAL WORK - NSDCPNINST_GEN_ALL_CORE
Notify Dr Glover if you experience any increase in pain not relieved with medication, any redness, drainage or swelling around incision or any fever >100.5.  Drink plenty of fluids.  No heavy lifting or straining.  Continue to do exercises, elevate your leg and continue cold therapy.  Use over the counter stool softeners to assist with constipation which can be a side effect of narcotic pain medication.

## 2023-05-24 ENCOUNTER — INPATIENT (INPATIENT)
Facility: HOSPITAL | Age: 63
LOS: 7 days | Discharge: HOME | DRG: 949 | End: 2023-06-01
Attending: PHYSICAL MEDICINE & REHABILITATION | Admitting: PHYSICAL MEDICINE & REHABILITATION
Payer: MEDICAID

## 2023-05-24 VITALS
SYSTOLIC BLOOD PRESSURE: 150 MMHG | DIASTOLIC BLOOD PRESSURE: 83 MMHG | TEMPERATURE: 100 F | WEIGHT: 199.3 LBS | OXYGEN SATURATION: 96 % | HEART RATE: 80 BPM | HEIGHT: 60.4 IN | RESPIRATION RATE: 18 BRPM

## 2023-05-24 VITALS
DIASTOLIC BLOOD PRESSURE: 55 MMHG | OXYGEN SATURATION: 100 % | HEART RATE: 74 BPM | RESPIRATION RATE: 17 BRPM | SYSTOLIC BLOOD PRESSURE: 125 MMHG | TEMPERATURE: 99 F

## 2023-05-24 DIAGNOSIS — Z98.49 CATARACT EXTRACTION STATUS, UNSPECIFIED EYE: Chronic | ICD-10-CM

## 2023-05-24 DIAGNOSIS — Z96.659 PRESENCE OF UNSPECIFIED ARTIFICIAL KNEE JOINT: ICD-10-CM

## 2023-05-24 DIAGNOSIS — Z90.710 ACQUIRED ABSENCE OF BOTH CERVIX AND UTERUS: Chronic | ICD-10-CM

## 2023-05-24 DIAGNOSIS — Z98.890 OTHER SPECIFIED POSTPROCEDURAL STATES: Chronic | ICD-10-CM

## 2023-05-24 DIAGNOSIS — Z90.49 ACQUIRED ABSENCE OF OTHER SPECIFIED PARTS OF DIGESTIVE TRACT: Chronic | ICD-10-CM

## 2023-05-24 LAB
GLUCOSE BLDC GLUCOMTR-MCNC: 128 MG/DL — HIGH (ref 70–99)
GLUCOSE BLDC GLUCOMTR-MCNC: 143 MG/DL — HIGH (ref 70–99)
GLUCOSE BLDC GLUCOMTR-MCNC: 159 MG/DL — HIGH (ref 70–99)
GLUCOSE BLDC GLUCOMTR-MCNC: 183 MG/DL — HIGH (ref 70–99)

## 2023-05-24 PROCEDURE — 99222 1ST HOSP IP/OBS MODERATE 55: CPT

## 2023-05-24 RX ORDER — TRAMADOL HYDROCHLORIDE 50 MG/1
1 TABLET ORAL
Qty: 0 | Refills: 0 | DISCHARGE
Start: 2023-05-24

## 2023-05-24 RX ORDER — INSULIN LISPRO 100/ML
VIAL (ML) SUBCUTANEOUS
Refills: 0 | Status: DISCONTINUED | OUTPATIENT
Start: 2023-05-24 | End: 2023-05-29

## 2023-05-24 RX ORDER — IBUPROFEN 200 MG
1 TABLET ORAL
Refills: 0 | DISCHARGE

## 2023-05-24 RX ORDER — DEXTROSE 50 % IN WATER 50 %
12.5 SYRINGE (ML) INTRAVENOUS ONCE
Refills: 0 | Status: DISCONTINUED | OUTPATIENT
Start: 2023-05-24 | End: 2023-06-01

## 2023-05-24 RX ORDER — SODIUM CHLORIDE 9 MG/ML
1000 INJECTION, SOLUTION INTRAVENOUS
Refills: 0 | Status: DISCONTINUED | OUTPATIENT
Start: 2023-05-24 | End: 2023-06-01

## 2023-05-24 RX ORDER — LOSARTAN POTASSIUM 100 MG/1
50 TABLET, FILM COATED ORAL DAILY
Refills: 0 | Status: DISCONTINUED | OUTPATIENT
Start: 2023-05-24 | End: 2023-06-01

## 2023-05-24 RX ORDER — TRAMADOL HYDROCHLORIDE 50 MG/1
50 TABLET ORAL EVERY 6 HOURS
Refills: 0 | Status: DISCONTINUED | OUTPATIENT
Start: 2023-05-24 | End: 2023-05-30

## 2023-05-24 RX ORDER — OXYCODONE HYDROCHLORIDE 5 MG/1
1 TABLET ORAL
Qty: 0 | Refills: 0 | DISCHARGE
Start: 2023-05-24

## 2023-05-24 RX ORDER — POLYETHYLENE GLYCOL 3350 17 G/17G
17 POWDER, FOR SOLUTION ORAL
Qty: 0 | Refills: 0 | DISCHARGE
Start: 2023-05-24

## 2023-05-24 RX ORDER — ASPIRIN/CALCIUM CARB/MAGNESIUM 324 MG
1 TABLET ORAL
Qty: 0 | Refills: 0 | DISCHARGE
Start: 2023-05-24

## 2023-05-24 RX ORDER — ASPIRIN/CALCIUM CARB/MAGNESIUM 324 MG
0 TABLET ORAL
Refills: 0 | DISCHARGE

## 2023-05-24 RX ORDER — FOLIC ACID 0.8 MG
1 TABLET ORAL DAILY
Refills: 0 | Status: DISCONTINUED | OUTPATIENT
Start: 2023-05-24 | End: 2023-06-01

## 2023-05-24 RX ORDER — DULAGLUTIDE 4.5 MG/.5ML
1.5 INJECTION, SOLUTION SUBCUTANEOUS
Refills: 0 | DISCHARGE

## 2023-05-24 RX ORDER — OXYCODONE HYDROCHLORIDE 5 MG/1
10 TABLET ORAL EVERY 4 HOURS
Refills: 0 | Status: DISCONTINUED | OUTPATIENT
Start: 2023-05-24 | End: 2023-05-30

## 2023-05-24 RX ORDER — PANTOPRAZOLE SODIUM 20 MG/1
1 TABLET, DELAYED RELEASE ORAL
Qty: 0 | Refills: 0 | DISCHARGE
Start: 2023-05-24

## 2023-05-24 RX ORDER — SENNA PLUS 8.6 MG/1
2 TABLET ORAL
Qty: 0 | Refills: 0 | DISCHARGE
Start: 2023-05-24

## 2023-05-24 RX ORDER — PANTOPRAZOLE SODIUM 20 MG/1
40 TABLET, DELAYED RELEASE ORAL
Refills: 0 | Status: DISCONTINUED | OUTPATIENT
Start: 2023-05-24 | End: 2023-06-01

## 2023-05-24 RX ORDER — CELECOXIB 200 MG/1
200 CAPSULE ORAL
Refills: 0 | Status: DISCONTINUED | OUTPATIENT
Start: 2023-05-24 | End: 2023-06-01

## 2023-05-24 RX ORDER — ASPIRIN/CALCIUM CARB/MAGNESIUM 324 MG
325 TABLET ORAL
Refills: 0 | Status: DISCONTINUED | OUTPATIENT
Start: 2023-05-24 | End: 2023-06-01

## 2023-05-24 RX ORDER — GLUCAGON INJECTION, SOLUTION 0.5 MG/.1ML
1 INJECTION, SOLUTION SUBCUTANEOUS ONCE
Refills: 0 | Status: DISCONTINUED | OUTPATIENT
Start: 2023-05-24 | End: 2023-06-01

## 2023-05-24 RX ORDER — POLYETHYLENE GLYCOL 3350 17 G/17G
17 POWDER, FOR SOLUTION ORAL AT BEDTIME
Refills: 0 | Status: DISCONTINUED | OUTPATIENT
Start: 2023-05-24 | End: 2023-06-01

## 2023-05-24 RX ORDER — INSULIN LISPRO 100/ML
VIAL (ML) SUBCUTANEOUS AT BEDTIME
Refills: 0 | Status: DISCONTINUED | OUTPATIENT
Start: 2023-05-24 | End: 2023-05-29

## 2023-05-24 RX ORDER — ATORVASTATIN CALCIUM 80 MG/1
10 TABLET, FILM COATED ORAL AT BEDTIME
Refills: 0 | Status: DISCONTINUED | OUTPATIENT
Start: 2023-05-24 | End: 2023-06-01

## 2023-05-24 RX ORDER — DEXTROSE 50 % IN WATER 50 %
15 SYRINGE (ML) INTRAVENOUS ONCE
Refills: 0 | Status: DISCONTINUED | OUTPATIENT
Start: 2023-05-24 | End: 2023-06-01

## 2023-05-24 RX ORDER — OXYCODONE HYDROCHLORIDE 5 MG/1
5 TABLET ORAL EVERY 4 HOURS
Refills: 0 | Status: DISCONTINUED | OUTPATIENT
Start: 2023-05-24 | End: 2023-05-30

## 2023-05-24 RX ORDER — DEXTROSE 50 % IN WATER 50 %
25 SYRINGE (ML) INTRAVENOUS ONCE
Refills: 0 | Status: DISCONTINUED | OUTPATIENT
Start: 2023-05-24 | End: 2023-06-01

## 2023-05-24 RX ADMIN — Medication 1 TABLET(S): at 12:50

## 2023-05-24 RX ADMIN — PANTOPRAZOLE SODIUM 40 MILLIGRAM(S): 20 TABLET, DELAYED RELEASE ORAL at 06:02

## 2023-05-24 RX ADMIN — OXYCODONE HYDROCHLORIDE 10 MILLIGRAM(S): 5 TABLET ORAL at 10:20

## 2023-05-24 RX ADMIN — Medication 1 MILLIGRAM(S): at 12:49

## 2023-05-24 RX ADMIN — ATORVASTATIN CALCIUM 10 MILLIGRAM(S): 80 TABLET, FILM COATED ORAL at 22:12

## 2023-05-24 RX ADMIN — OXYCODONE HYDROCHLORIDE 10 MILLIGRAM(S): 5 TABLET ORAL at 04:51

## 2023-05-24 RX ADMIN — OXYCODONE HYDROCHLORIDE 10 MILLIGRAM(S): 5 TABLET ORAL at 09:21

## 2023-05-24 RX ADMIN — POLYETHYLENE GLYCOL 3350 17 GRAM(S): 17 POWDER, FOR SOLUTION ORAL at 22:09

## 2023-05-24 RX ADMIN — Medication 325 MILLIGRAM(S): at 06:01

## 2023-05-24 RX ADMIN — CELECOXIB 200 MILLIGRAM(S): 200 CAPSULE ORAL at 06:02

## 2023-05-24 RX ADMIN — OXYCODONE HYDROCHLORIDE 10 MILLIGRAM(S): 5 TABLET ORAL at 22:09

## 2023-05-24 RX ADMIN — OXYCODONE HYDROCHLORIDE 10 MILLIGRAM(S): 5 TABLET ORAL at 23:09

## 2023-05-24 RX ADMIN — OXYCODONE HYDROCHLORIDE 10 MILLIGRAM(S): 5 TABLET ORAL at 05:45

## 2023-05-24 RX ADMIN — OXYCODONE HYDROCHLORIDE 10 MILLIGRAM(S): 5 TABLET ORAL at 14:19

## 2023-05-24 RX ADMIN — LOSARTAN POTASSIUM 50 MILLIGRAM(S): 100 TABLET, FILM COATED ORAL at 06:02

## 2023-05-24 RX ADMIN — CELECOXIB 200 MILLIGRAM(S): 200 CAPSULE ORAL at 06:38

## 2023-05-24 RX ADMIN — SODIUM CHLORIDE 3 MILLILITER(S): 9 INJECTION INTRAMUSCULAR; INTRAVENOUS; SUBCUTANEOUS at 06:38

## 2023-05-24 NOTE — H&P ADULT - NSHPPHYSICALEXAM_GEN_ALL_CORE
Constitutional - NAD, Comfortable  HEENT - NCAT, EOMI  Neck - Supple, No limited ROM  Chest - good chest expansion, good respiratory effort, CTAB   Cardio - warm and well perfused  Abdomen -  Soft, NTND  Extremities - No peripheral edema, No calf tenderness. Aquacel dressing in place on the LLE.   Neurologic Exam:                    Cognitive -             Orientation: Awake, Alert, AAO to self, place, date, year, situation            Thought: process, content appropriate     Speech - Fluent, Comprehensible, No dysarthria, No aphasia      Cranial Nerves - No facial asymmetry, Tongue midline, EOMI, Shoulder shrug intact     Motor -                      LEFT    UE - ShAB 5/5, EF 5/5, EE 5/5, WE 5/5,  WNL                    RIGHT UE - ShAB 5/5, EF 5/5, EE 5/5, WE 5/5,  WNL                    LEFT    LE - HF 3/5, KE 3/5, DF 4/5, PF 4/5                    RIGHT LE - HF 5/5, KE 5/5, DF 5/5, PF 5/5        Sensory - Intact to LT bilateral     OculoVestibular -  No nystagmus  Psychiatric - Mood stable, Affect WNL  Skin on admission: Aquacel dressing in place on the LLE. otherwise the skin is warm dry & intact.

## 2023-05-24 NOTE — H&P ADULT - HISTORY OF PRESENT ILLNESS
This is a 63yo Female with PMH of HTN, HLD, DM and OA who presents [facility] in 5[facility] for orthopedic surgery. Patient s/p left total knee arthroplasty with Dr. Glover on 5/22/2023. Patient tolerated the procedure well without any intraoperative complications. Patient tolerated physical therapy well, and the pain was controlled. Patient is weight bearing as tolerated with cane/walker as needed. Seen by medical attending for continuity of care and management and cleared for safe discharge. Any suture/staples to be removed on post-op day #14. Patient is on 325mg of ASA for DVT prophylaxis for total of 6 weeks.    Patient was evaluated by PM&R and therapy for functional deficits, gait/ADL impairments and acute rehabilitation was recommended. Patient was medically optimized for discharge to [facility] on 5/24/23.

## 2023-05-24 NOTE — PROGRESS NOTE ADULT - SUBJECTIVE AND OBJECTIVE BOX
POD # 2    Overnight events: None    SUBJECTIVE: Pt seen and examined at bedside. Patient is doing well, no acute complaints this AM. Pain is controlled with medication, patient is ambulating with assistance      OBJECTIVE:  Vital Signs Last 24 Hrs  T(C): 37.4 (24 May 2023 05:57), Max: 37.4 (24 May 2023 05:57)  T(F): 99.4 (24 May 2023 05:57), Max: 99.4 (24 May 2023 05:57)  HR: 73 (24 May 2023 05:57) (60 - 73)  BP: 146/56 (24 May 2023 05:57) (126/43 - 146/56)  BP(mean): --  RR: 16 (24 May 2023 05:57) (16 - 18)  SpO2: 95% (24 May 2023 05:57) (95% - 100%)    Parameters below as of 24 May 2023 05:57  Patient On (Oxygen Delivery Method): room air          05-22-23 @ 07:01  -  05-23-23 @ 07:00  --------------------------------------------------------  IN: 1030 mL / OUT: 600 mL / NET: 430 mL    05-23-23 @ 07:01  -  05-24-23 @ 06:55  --------------------------------------------------------  IN: 0 mL / OUT: 600 mL / NET: -600 mL        Physical Examination:  GEN: NAD, resting quietly  PULM: symmetric chest rise bilaterally, no increased WOB  ABD: nondistended  EXTR:   LLE:   Dressing C/D/I  Motor intact + EHL/FHL/TA/GS. SILT  Compartments are soft, extremities are warm, DP 2+      LABS:                        9.0    8.68  )-----------( 249      ( 23 May 2023 05:35 )             26.7       05-23    139  |  104  |  11  ----------------------------<  84  3.6   |  24  |  0.66    Ca    8.3<L>      23 May 2023 05:35        
Orthopedics Progress Note:   Patient was seen and examined at bedside. Daughter was available on phone for translation. Denies CP/SOB/Dizziness/N/V/D/HA. Pain is well controlled at the moment.    Vital Signs Last 24 Hrs  T(C): 36.7 (23 May 2023 05:40), Max: 36.7 (22 May 2023 21:08)  T(F): 98 (23 May 2023 05:40), Max: 98 (22 May 2023 21:08)  HR: 62 (23 May 2023 05:40) (57 - 71)  BP: 145/65 (23 May 2023 05:40) (133/98 - 166/77)  BP(mean): 102 (22 May 2023 13:45) (93 - 106)  RR: 18 (23 May 2023 05:40) (12 - 18)  SpO2: 100% (23 May 2023 05:40) (96% - 100%)    Parameters below as of 23 May 2023 05:40  Patient On (Oxygen Delivery Method): room air        Labs:                          10.9   7.28  )-----------( 267      ( 22 May 2023 12:15 )             33.3       05-22    138  |  103  |  10  ----------------------------<  110<H>  4.1   |  23  |  0.69    Ca    9.0      22 May 2023 12:15          Physical Exam:  Gen: NAD  LLE:   Dressing C/D/I  Motor intact + EHL/FHL/TA/GS. Sensation is grossly intact.   Compartments are soft, extremities are warm, DP 2+          
Orthopedics Post-Op Check:  Patient was seen and examined at bedside. Denies CP/SOB/Dizziness/N/V/D/HA. Pain is well controlled at the moment.    Vital Signs Last 24 Hrs  T(C): 36.2 (22 May 2023 14:45), Max: 36.4 (22 May 2023 12:10)  T(F): 97.2 (22 May 2023 14:45), Max: 97.5 (22 May 2023 12:10)  HR: 62 (22 May 2023 14:45) (57 - 71)  BP: 154/78 (22 May 2023 14:45) (133/98 - 166/77)  BP(mean): 102 (22 May 2023 13:45) (93 - 106)  RR: 17 (22 May 2023 14:45) (12 - 18)  SpO2: 100% (22 May 2023 14:45) (96% - 100%)    Parameters below as of 22 May 2023 14:45  Patient On (Oxygen Delivery Method): room air          Labs:                          10.9   7.28  )-----------( 267      ( 22 May 2023 12:15 )             33.3       05-22    138  |  103  |  10  ----------------------------<  110<H>  4.1   |  23  |  0.69    Ca    9.0      22 May 2023 12:15          Physical Exam:  Gen: NAD  L LE:   Dressing C/D/I  Motor intact + EHL/FHL/TA/GS. Sensation is grossly intact.   Compartments are soft, extremities are warm, DP 2+

## 2023-05-24 NOTE — PROGRESS NOTE ADULT - ATTENDING COMMENTS
Patient seen and examined. Gurpreet Spencer is a 62 year old female now status post Left Total Knee Arthroplasty. Patient is currently doing well. She walked well with PT yesterday. Discussed patient care with patient's daughter over the patient's phone. Patient's daughter would like to consider rehab placement. She picked a rehab yesterday. Discussed following up with PT recommendations. No chest pain or shortness of breath. No acute overnight events.    Physical Exam:  Dressing: Clean, Dry, Intact  Motor: Intact EHL/FHL/Tibialis Anterior/Gastrocnemius  Sensory: Intact Superficial Peroneal/Deep Peroneal/Saphenous/Sural/Tibial Nerves  Vascular: 2+ DP Pulse    Assessment/Plan:  Gurpreet Spencer is a 62 year old female now status post Left Total Knee Arthroplasty    Plan:  -Left Lower Extremity: Weight Bearing as Tolerated  -PT/OT, Out of Bed  -Pain Control: Per Protocol  -DVT Prophylaxis: Aspirin 325mg twice per day  -Antibiotics: Ancef 2g x24 hours  -Follow up Medicine  -Disposition: Rehab

## 2023-05-24 NOTE — PROGRESS NOTE ADULT - ASSESSMENT
A/P: 63 y/o Female s/p L total knee arthroplasty, POD #0  - Pain control  - Antibiotic - Ancef postop  - Incentive Spirometry  - DVT prophylaxis: Venodynes/Aspirin 325mg BID  - F/U AM Labs  - PT/OT/WBAT  - Notify Orthopedics with any questions
A/P: 62y y/o Female s/p L total knee arthroplasty, DOS 5/22/23  - Pain control  - Incentive Spirometry  - DVT prophylaxis: Venodynes/ Aspirin 325mg BID  - F/U AM Labs  - PT/OT/WBAT  - Dispo: PT recommending [facility]  - Notify Orthopedics with any questions
A/P: 62y y/o Female s/p L total knee arthroplasty, DOS 5/22/23  - Pain control  - Incentive Spirometry  - DVT prophylaxis: Venodynes/ Aspirin 325mg BID  - WBAT  - PT/OT  - Dispo: PT recommending [facility]    For all questions related to patient care, please reach out to the on-call team via the pager.     Margi Cazares, PGY 1  Orthopaedic Surgery  [facility] [phone]  [facility] [phone]  [facility] [phone]

## 2023-05-24 NOTE — H&P ADULT - NSHPSOCIALHISTORY_GEN_ALL_CORE
Smoking - Denies   ETOH - Denies   Illicit Drugs - Denies     Social - Lives with son and daughter in law in home with 5 steps to enter, 1 flight to bedroom  Independent at baseline    CURRENT FUNCTIONAL STATUS  5/23   Sit-Stand Transfer Training  Transfer Training Sit-to-Stand Transfer: contact guard;  1 person assist;  weight-bearing as tolerated   rolling walker      Gait Skills:     · Level of Faulk	contact guard  · Physical Assist/Nonphysical Assist	1 person assist; verbal cues  · Weight-Bearing Restrictions	weight-bearing as tolerated  · Assistive Device	rolling walker  · Gait Distance	50 feet    Gait Analysis:     · Gait Pattern Used	3-point gait  · Gait Deviations Noted	decreased isaac; decreased step length; decreased stride length  · Impairments Contributing to Gait Deviations	impaired balance; decreased strength Smoking - Denies   ETOH - Denies   Illicit Drugs - Denies     Social - Lives with son and daughter in law in home with 5 steps to enter, 1 flight to bedroom ( 17 steps )   Independent at baseline    CURRENT FUNCTIONAL STATUS  5/23   Sit-Stand Transfer Training  Transfer Training Sit-to-Stand Transfer: contact guard;  1 person assist;  weight-bearing as tolerated   rolling walker      Gait Skills:     · Level of Osborne	contact guard  · Physical Assist/Nonphysical Assist	1 person assist; verbal cues  · Weight-Bearing Restrictions	weight-bearing as tolerated  · Assistive Device	rolling walker  · Gait Distance	50 feet    Gait Analysis:     · Gait Pattern Used	3-point gait  · Gait Deviations Noted	decreased isaac; decreased step length; decreased stride length  · Impairments Contributing to Gait Deviations	impaired balance; decreased strength

## 2023-05-24 NOTE — CONSULT NOTE ADULT - SUBJECTIVE AND OBJECTIVE BOX
Patient is a 62y old  Female who presents with a chief complaint of Primary osteoarthritis of left knee     (24 May 2023 10:27)      HPI:  63 yo f pmh dm, hld, htn presented for elective L total knee replacement, performed 5/22/23.       REVIEW OF SYSTEMS  Constitutional - No fever, No weight loss, No fatigue  HEENT - No eye pain, No visual disturbances, No difficulty hearing, No tinnitus, No vertigo, No neck pain  Respiratory - No cough, No wheezing, No shortness of breath  Cardiovascular - No chest pain, No palpitations  Gastrointestinal - No abdominal pain, No nausea, No vomiting, No diarrhea, No constipation  Genitourinary - No dysuria, No frequency, No hematuria, No incontinence  Neurological - No headaches, No memory loss, No loss of strength, No numbness, No tremors  Skin - No itching, No rashes, No lesions   Endocrine - No temperature intolerance  Musculoskeletal - No joint pain, No joint swelling, No muscle pain  Psychiatric - No depression, No anxiety    PAST MEDICAL & SURGICAL HISTORY  HTN (hypertension)    HLD (hyperlipidemia)    Type 2 diabetes mellitus    Obesity    History of cholecystectomy    Status post total abdominal hysterectomy and bilateral salpingo-oophorectomy (REGGIE-BSO)    H/O cataract extraction    Status post excision of lipoma        SOCIAL HISTORY  Smoking - Denied  EtOH - Denied   Drugs - Denied    FUNCTIONAL HISTORY  Lives with son and daughter in law in home with 5 steps to enter, 1 flight to bedroom  Independent    CURRENT FUNCTIONAL STATUS  5/23   Sit-Stand Transfer Training  Transfer Training Sit-to-Stand Transfer: contact guard;  1 person assist;  weight-bearing as tolerated   rolling walker  Transfer Training Stand-to-Sit Transfer: contact guard;  1 person assist;  weight-bearing as tolerated   rolling walker  Sit-to-Stand Transfer Training Transfer Safety Analysis: decreased step length;  decreased flexibility;  decreased strength;  rolling walker    Gait Training  Gait Training: contact guard;  1 person assist;  weight-bearing as tolerated   rolling walker;  50 feet  Gait Analysis: 3-point gait   decreased step length;  decreased strength;  decreased flexibility;  50 feet;  rolling walker    Therapeutic Exercise  Therapeutic Exercise Detail: Pt performed ActiveROM Left LE in sitting.           5/22  Transfer: Sit to Stand:     · Level of [place]	contact guard  · Physical Assist/Nonphysical Assist	1 person assist; verbal cues  · Weight-Bearing Restrictions	weight-bearing as tolerated  · Assistive Device	rolling walker    Transfer: Stand to Sit:     · Level of [place]	contact guard  · Physical Assist/Nonphysical Assist	1 person assist; verbal cues  · Weight-Bearing Restrictions	weight-bearing as tolerated  · Assistive Device	rolling walker    Gait Skills:     · Level of [place]	contact guard  · Physical Assist/Nonphysical Assist	1 person assist; verbal cues  · Weight-Bearing Restrictions	weight-bearing as tolerated  · Assistive Device	rolling walker  · Gait Distance	50 feet    Gait Analysis:     · Gait Pattern Used	3-point gait  · Gait Deviations Noted	decreased isaac; decreased step length; decreased stride length  · Impairments Contributing to Gait Deviations	impaired balance; decreased strength         RECENT LABS/IMAGING  CBC Full  -  ( 23 May 2023 05:35 )  WBC Count : 8.68 K/uL  RBC Count : 3.11 M/uL  Hemoglobin : 9.0 g/dL  Hematocrit : 26.7 %  Platelet Count - Automated : 249 K/uL  Mean Cell Volume : 85.9 fL  Mean Cell Hemoglobin : 28.9 pg  Mean Cell Hemoglobin Concentration : 33.7 gm/dL  Auto Neutrophil # : x  Auto Lymphocyte # : x  Auto Monocyte # : x  Auto Eosinophil # : x  Auto Basophil # : x  Auto Neutrophil % : x  Auto Lymphocyte % : x  Auto Monocyte % : x  Auto Eosinophil % : x  Auto Basophil % : x    05-23    139  |  104  |  11  ----------------------------<  84  3.6   |  24  |  0.66    Ca    8.3<L>      23 May 2023 05:35          VITALS  T(C): 37 (05-24-23 @ 09:49), Max: 37.4 (05-24-23 @ 05:57)  HR: 78 (05-24-23 @ 09:49) (60 - 78)  BP: 143/58 (05-24-23 @ 09:49) (126/43 - 146/56)  RR: 17 (05-24-23 @ 09:49) (16 - 18)  SpO2: 99% (05-24-23 @ 09:49) (95% - 100%)  Wt(kg): --    ALLERGIES  No Known Allergies      MEDICATIONS   acetaminophen   IVPB .. 1000 milliGRAM(s) IV Intermittent once  aspirin 325 milliGRAM(s) Oral two times a day  atorvastatin 10 milliGRAM(s) Oral at bedtime  celecoxib 200 milliGRAM(s) Oral two times a day  dextrose 5%. 1000 milliLiter(s) IV Continuous <Continuous>  dextrose 5%. 1000 milliLiter(s) IV Continuous <Continuous>  dextrose 50% Injectable 25 Gram(s) IV Push once  dextrose 50% Injectable 12.5 Gram(s) IV Push once  dextrose 50% Injectable 25 Gram(s) IV Push once  dextrose Oral Gel 15 Gram(s) Oral once PRN  folic acid 1 milliGRAM(s) Oral daily  glucagon  Injectable 1 milliGRAM(s) IntraMuscular once  hydrochlorothiazide 12.5 milliGRAM(s) Oral daily  insulin lispro (ADMELOG) corrective regimen sliding scale   SubCutaneous three times a day before meals  insulin lispro (ADMELOG) corrective regimen sliding scale   SubCutaneous at bedtime  lactated ringers. 1000 milliLiter(s) IV Continuous <Continuous>  losartan 50 milliGRAM(s) Oral daily  magnesium hydroxide Suspension 30 milliLiter(s) Oral daily PRN  multivitamin 1 Tablet(s) Oral daily  ondansetron Injectable 4 milliGRAM(s) IV Push every 6 hours PRN  oxyCODONE    IR 5 milliGRAM(s) Oral every 4 hours PRN  oxyCODONE    IR 10 milliGRAM(s) Oral every 4 hours PRN  pantoprazole    Tablet 40 milliGRAM(s) Oral before breakfast  polyethylene glycol 3350 17 Gram(s) Oral at bedtime  senna 2 Tablet(s) Oral at bedtime  sodium chloride 0.9% lock flush 3 milliLiter(s) IV Push every 8 hours  traMADol 50 milliGRAM(s) Oral every 6 hours PRN      ----------------------------------------------------------------------------------------  PHYSICAL EXAM  Constitutional - NAD, Comfortable  HEENT - NCAT, EOMI  Neck - Supple, No limited ROM  Chest - CTA bilaterally, No wheeze, No rhonchi, No crackles  Cardiovascular - RRR, S1S2, No murmurs  Abdomen - BS+, Soft, NTND  Extremities - No C/C/E, No calf tenderness   Neurologic Exam -                    Cognitive - Awake, Alert, AAO to self, place, date, year, situation     Communication - Fluent, No dysarthria     Cranial Nerves - CN 2-12 intact     Motor - No focal deficits                    LEFT    UE - ShAB 5/5, EF 5/5, EE 5/5, WE 5/5,  5/5                    RIGHT UE - ShAB 5/5, EF 5/5, EE 5/5, WE 5/5,  5/5                    LEFT    LE - HF 5/5, KE 5/5, DF 5/5, PF 5/5                    RIGHT LE - HF 5/5, KE 5/5, DF 5/5, PF 5/5        Sensory - Intact to LT     Reflexes - DTR Intact, No primitive reflexive     Coordination - FTN intact     OculoVestibular - No saccades, No nystagmus, VOR         Balance - WNL Static  Psychiatric - Mood stable, Affect WNL  ----------------------------------------------------------------------------------------  ASSESSMENT/PLAN    Pain -  DVT PPX -   Rehab -     incomplete note, consult in progress Patient is a 62y old  Female who presents with a chief complaint of Primary osteoarthritis of left knee     (24 May 2023 10:27)      HPI:  63 yo f pmh dm, hld, htn presented for elective L total knee replacement, performed 5/22/23.     daughter in law assisting with jessie interpretation by phone per patient request.  patient reports feeling sleepy from medication, reports pain controlled currently.  tolerated bedside PT yesterday.    REVIEW OF SYSTEMS  pain   weakness    PAST MEDICAL & SURGICAL HISTORY  HTN (hypertension)    HLD (hyperlipidemia)    Type 2 diabetes mellitus    Obesity    History of cholecystectomy    Status post total abdominal hysterectomy and bilateral salpingo-oophorectomy (REGGIE-BSO)    H/O cataract extraction    Status post excision of lipoma        SOCIAL HISTORY  Smoking - Denied  EtOH - Denied   Drugs - Denied    FUNCTIONAL HISTORY  Lives with son and daughter in law in home with 5 steps to enter, 1 flight to bedroom  Independent at baseline    CURRENT FUNCTIONAL STATUS  5/23   Sit-Stand Transfer Training  Transfer Training Sit-to-Stand Transfer: contact guard;  1 person assist;  weight-bearing as tolerated   rolling walker  Transfer Training Stand-to-Sit Transfer: contact guard;  1 person assist;  weight-bearing as tolerated   rolling walker  Sit-to-Stand Transfer Training Transfer Safety Analysis: decreased step length;  decreased flexibility;  decreased strength;  rolling walker    Gait Training  Gait Training: contact guard;  1 person assist;  weight-bearing as tolerated   rolling walker;  50 feet  Gait Analysis: 3-point gait   decreased step length;  decreased strength;  decreased flexibility;  50 feet;  rolling walker    Therapeutic Exercise  Therapeutic Exercise Detail: Pt performed ActiveROM Left LE in sitting.           5/22  Transfer: Sit to Stand:     · Level of [place]	contact guard  · Physical Assist/Nonphysical Assist	1 person assist; verbal cues  · Weight-Bearing Restrictions	weight-bearing as tolerated  · Assistive Device	rolling walker    Transfer: Stand to Sit:     · Level of [place]	contact guard  · Physical Assist/Nonphysical Assist	1 person assist; verbal cues  · Weight-Bearing Restrictions	weight-bearing as tolerated  · Assistive Device	rolling walker    Gait Skills:     · Level of [place]	contact guard  · Physical Assist/Nonphysical Assist	1 person assist; verbal cues  · Weight-Bearing Restrictions	weight-bearing as tolerated  · Assistive Device	rolling walker  · Gait Distance	50 feet    Gait Analysis:     · Gait Pattern Used	3-point gait  · Gait Deviations Noted	decreased isaac; decreased step length; decreased stride length  · Impairments Contributing to Gait Deviations	impaired balance; decreased strength         RECENT LABS/IMAGING  CBC Full  -  ( 23 May 2023 05:35 )  WBC Count : 8.68 K/uL  RBC Count : 3.11 M/uL  Hemoglobin : 9.0 g/dL  Hematocrit : 26.7 %  Platelet Count - Automated : 249 K/uL  Mean Cell Volume : 85.9 fL  Mean Cell Hemoglobin : 28.9 pg  Mean Cell Hemoglobin Concentration : 33.7 gm/dL  Auto Neutrophil # : x  Auto Lymphocyte # : x  Auto Monocyte # : x  Auto Eosinophil # : x  Auto Basophil # : x  Auto Neutrophil % : x  Auto Lymphocyte % : x  Auto Monocyte % : x  Auto Eosinophil % : x  Auto Basophil % : x    05-23    139  |  104  |  11  ----------------------------<  84  3.6   |  24  |  0.66    Ca    8.3<L>      23 May 2023 05:35          VITALS  T(C): 37 (05-24-23 @ 09:49), Max: 37.4 (05-24-23 @ 05:57)  HR: 78 (05-24-23 @ 09:49) (60 - 78)  BP: 143/58 (05-24-23 @ 09:49) (126/43 - 146/56)  RR: 17 (05-24-23 @ 09:49) (16 - 18)  SpO2: 99% (05-24-23 @ 09:49) (95% - 100%)  Wt(kg): --    ALLERGIES  No Known Allergies      MEDICATIONS   acetaminophen   IVPB .. 1000 milliGRAM(s) IV Intermittent once  aspirin 325 milliGRAM(s) Oral two times a day  atorvastatin 10 milliGRAM(s) Oral at bedtime  celecoxib 200 milliGRAM(s) Oral two times a day  dextrose 5%. 1000 milliLiter(s) IV Continuous <Continuous>  dextrose 5%. 1000 milliLiter(s) IV Continuous <Continuous>  dextrose 50% Injectable 25 Gram(s) IV Push once  dextrose 50% Injectable 12.5 Gram(s) IV Push once  dextrose 50% Injectable 25 Gram(s) IV Push once  dextrose Oral Gel 15 Gram(s) Oral once PRN  folic acid 1 milliGRAM(s) Oral daily  glucagon  Injectable 1 milliGRAM(s) IntraMuscular once  hydrochlorothiazide 12.5 milliGRAM(s) Oral daily  insulin lispro (ADMELOG) corrective regimen sliding scale   SubCutaneous three times a day before meals  insulin lispro (ADMELOG) corrective regimen sliding scale   SubCutaneous at bedtime  lactated ringers. 1000 milliLiter(s) IV Continuous <Continuous>  losartan 50 milliGRAM(s) Oral daily  magnesium hydroxide Suspension 30 milliLiter(s) Oral daily PRN  multivitamin 1 Tablet(s) Oral daily  ondansetron Injectable 4 milliGRAM(s) IV Push every 6 hours PRN  oxyCODONE    IR 5 milliGRAM(s) Oral every 4 hours PRN  oxyCODONE    IR 10 milliGRAM(s) Oral every 4 hours PRN  pantoprazole    Tablet 40 milliGRAM(s) Oral before breakfast  polyethylene glycol 3350 17 Gram(s) Oral at bedtime  senna 2 Tablet(s) Oral at bedtime  sodium chloride 0.9% lock flush 3 milliLiter(s) IV Push every 8 hours  traMADol 50 milliGRAM(s) Oral every 6 hours PRN      ----------------------------------------------------------------------------------------  PHYSICAL EXAM  Constitutional - NAD, Comfortable  HEENT - NCAT, EOMI   Cardiovascular - RRR, S1S2  Abdomen -  Soft, NTND  Extremities - L knee dressing intact, with ice treatment  Neurologic Exam -                    Cognitive - Awake, Alert, AAO to self, place, date, year, situation     Communication - Fluent, No dysarthria     Cranial Nerves - CN 2-12 intact     Motor -                      LEFT    UE - ShAB 5/5, EF 5/5, EE 5/5, WE 5/5,  5/5                    RIGHT UE - ShAB 5/5, EF 5/5, EE 5/5, WE 5/5,  5/5                    LEFT    LE - HF 3/5                     RIGHT LE - HF 5/5, KE 5/5, DF 5/5, PF 5/5        Sensory - Intact to LT      Balance - WNL Static  Psychiatric - Mood stable, Affect WNL  ----------------------------------------------------------------------------------------  ASSESSMENT/PLAN  63 yo f s/p L TKA  WBAT  Pain - iv tylenol, celebrex,oxy ir prn - reports feeling sleepy after 10mg dose, recommend trying 5mg dose.  bowel regimen  DVT PPX - asa  consistent carb diet no snacks  continue bedside PT and OT  OOB to chair as tolerates  Rehab -   recommend acute inpatient rehab when medically cleared. Patient can tolerate 3 hours per day of therapy with medical supervision.      estimated length of stay 1 week in acute rehab.

## 2023-05-24 NOTE — H&P ADULT - NSHPLABSRESULTS_GEN_ALL_CORE
Radiology     < from: Xray Knee 1 or 2 Views, Left (05.22.23 @ 12:01) >      ACC: [id number] EXAM:  XR KNEE 1-2 VIEWS LT   ORDERED BY: NIDHI ELIZABETH     PROCEDURE DATE:  05/22/2023          INTERPRETATION:  CLINICAL INDICATION: baseline postoperative evaluation   sets post left total knee replacement for osteoarthritis    EXAM:  Frontal and crosstable lateral left knee from 5/22/2023 at 0745.    IMPRESSION:  Unconstrained left total knee prosthesis implanted.    Intact and aligned hardware and no periprosthetic fractures.    Postoperative soft tissue changes.    Correlate with intraoperative findings.    --- End of Report ---    < end of copied text >

## 2023-05-24 NOTE — H&P ADULT - ASSESSMENT
This is a 61yo Female with PMH of HTN, HLD, DM and OA who presents to [facility] in 5/22 for orthopedic surgery. Patient s/p left total knee arthroplasty with Dr. Glover on 5/22/2023. Patient tolerated the procedure well without any intraoperative complications. Patient tolerated physical therapy well, and the pain was controlled. Patient is weight bearing as tolerated with cane/walker as needed. Seen by medical attending for continuity of care and management and cleared for safe discharge. Any suture/staples to be removed on post-op day #14. Patient is on 325mg of ASA for DVT prophylaxis for total of 6 weeks.    # s/p left total knee arthroplasty  - Patient is weight bearing as tolerated with cane/walker as needed.   - 325mg of ASA BID for DVT prophylaxis, please take for 6 weeks.   - pain management as below.   - begin comprehensive rehab program OT, PT, SLP  3 hours daily 5 x week  - Precautions: fall.     #HTN  - Losartan 50 mg  - HCTZ 12.5mg  - monitor vitals, hospitalist consult    #DM   -ISS   -Monitor glucose     #HLD  - Atorvastatin 10 mg daily    #Pain management  - Celebrex 200 BID.   - Oxycodone 5mg & 10mg.   - Tizanidne 2mg PRN    GI/Bowel:  - At risk for constipation due to neurologic diagnosis, immobility and/or medication use  - Senna QHS, Miralax Daily  - GI ppx: Protonix    /Bladder:   - At risk for incontinence and retention due to neurologic diagnosis and limited mobility  - Continue catheter/bladder nursing protocol with bladder scans per routine with straight cath for >400cc.  - Encourage timed voids every 4 hours while awake for independence and to promote continence during therapy.      Skin/Pressure Injury:   - At risk for pressure injury due to neurologic diagnosis and relative immobility.  - Skin assessment on admission: ***  - Turn every 2 hours while in bed, air mattress  - Soft heel protectors  - Skin barrier cream as needed  - Nursing to monitor skin Qshift    #FEN   - Diet: Regular     #DVT ppx  -  BID        Outpatient Follow-up (Specialty/Name of physician):    Jose Antonio Glover)  Orthopedics  [address] [facility], [address] [place] [address]  Phone: [phone]  Fax: [phone]  Follow Up Time: 2 weeks        MEDICAL PROGNOSIS: GOOD            REHAB POTENTIAL: GOOD             ESTIMATED DISPOSITION: HOME WITH HOME CARE            ELOS: 17-21 Days   EXPECTED THERAPY:     P.T. 1hr/day       O.T. 1hr/day      S.L.P. 1hr/day     P&O Unnecessary     EXP FREQUENCY: 5 days per 7 day period     PRESCREEN COMPARISON:   I have reviewed the prescreen information and I have found no relevant changes between the preadmission screening and my post admission evaluation     RATIONALE FOR INPATIENT ADMISSION - Patient demonstrates the following: (check all that apply)  [X] Medically appropriate for rehabilitation admission  [X] Has attainable rehab goals with an appropriate initial discharge plan  [X] Has rehabilitation potential (expected to make a significant improvement within a reasonable period of time)   [X] Requires close medical management by a rehab physician, rehab nursing care, Hospitalist and comprehensive interdisciplinary team (including PT, OT, & or SLP, Prosthetics and Orthotics)         This is a 61yo Female with PMH of HTN, HLD, DM and OA who presents to [facility] in 5/22 for orthopedic surgery. Patient s/p left total knee arthroplasty with Dr. Glover on 5/22/2023. Patient tolerated the procedure well without any intraoperative complications. Patient tolerated physical therapy well, and the pain was controlled. Patient is weight bearing as tolerated with cane/walker as needed. Seen by medical attending for continuity of care and management and cleared for safe discharge. Any suture/staples to be removed on post-op day #14. Patient is on 325mg of ASA for DVT prophylaxis for total of 6 weeks.    # s/p left total knee arthroplasty  - Patient is weight bearing as tolerated with cane/walker as needed.   - 325mg of ASA BID for DVT prophylaxis, please take for 6 weeks.   - pain management as below.   - begin comprehensive rehab program OT, PT, SLP  3 hours daily 5 x week  - Precautions: fall.     #HTN  - Losartan 50 mg  - HCTZ 12.5mg  - monitor vitals, hospitalist consult    #DM   -ISS   -Monitor glucose   -Take Trulicity at home 1x a week. Daughter states she can bring in it.     #HLD  - Atorvastatin 10 mg daily    #Pain management  - Celebrex 200 BID.   - Oxycodone 5mg & 10mg.   - Tizanidne 2mg PRN    GI/Bowel:  - At risk for constipation due to neurologic diagnosis, immobility and/or medication use  - Senna QHS, Miralax Daily  - GI ppx: Protonix    /Bladder:   - At risk for incontinence and retention due to neurologic diagnosis and limited mobility  - Continue catheter/bladder nursing protocol with bladder scans per routine with straight cath for >400cc.  - Encourage timed voids every 4 hours while awake for independence and to promote continence during therapy.      Skin/Pressure Injury:   - At risk for pressure injury due to neurologic diagnosis and relative immobility.  - Skin assessment on admission: Aquacel dressing in place on the LLE  - Turn every 2 hours while in bed, air mattress  - Soft heel protectors  - Skin barrier cream as needed  - Nursing to monitor skin Qshift    #FEN   - Diet: Regular     #DVT ppx  -  BID      Outpatient Follow-up (Specialty/Name of physician):    Jose Antonio Glover)  Orthopedics  [address] [facility], [address] [place] [address]  Phone: [phone]  Fax: [phone]  Follow Up Time: 2 weeks        MEDICAL PROGNOSIS: GOOD            REHAB POTENTIAL: GOOD             ESTIMATED DISPOSITION: HOME WITH HOME CARE            ELOS: 17-21 Days   EXPECTED THERAPY:     P.T. 1hr/day       O.T. 1hr/day      S.L.P. 1hr/day     P&O Unnecessary     EXP FREQUENCY: 5 days per 7 day period     PRESCREEN COMPARISON:   I have reviewed the prescreen information and I have found no relevant changes between the preadmission screening and my post admission evaluation     RATIONALE FOR INPATIENT ADMISSION - Patient demonstrates the following: (check all that apply)  [X] Medically appropriate for rehabilitation admission  [X] Has attainable rehab goals with an appropriate initial discharge plan  [X] Has rehabilitation potential (expected to make a significant improvement within a reasonable period of time)   [X] Requires close medical management by a rehab physician, rehab nursing care, Hospitalist and comprehensive interdisciplinary team (including PT, OT, & or SLP, Prosthetics and Orthotics)         This is a 63yo Female with PMH of HTN, HLD, DM and OA who presents to [facility] in 5/22 for orthopedic surgery. Patient s/p left total knee arthroplasty with Dr. Glover on 5/22/2023. Patient tolerated the procedure well without any intraoperative complications. Patient tolerated physical therapy well, and the pain was controlled. Patient is weight bearing as tolerated with cane/walker as needed. Seen by medical attending for continuity of care and management and cleared for safe discharge. Any suture/staples to be removed on post-op day #14. Patient is on 325mg of ASA for DVT prophylaxis for total of 6 weeks.    # Left Total Knee Replacement Arthroplasty  - ADL and mobility impairment  - Need for assistance with personal care  - Patient is weight bearing as tolerated with cane/walker as needed.   - 325mg of ASA BID for DVT prophylaxis, please take for 6 weeks.   - pain management as below.   - begin comprehensive rehab program OT, PT, SLP  3 hours daily 5 x week  - Precautions: fall.     #HTN  - Losartan 50 mg  - HCTZ 12.5mg  - monitor vitals, hospitalist consult    #DM   -ISS   -Monitor glucose   -Take Trulicity at home 1x a week. Daughter states she can bring in it.     #HLD  - Atorvastatin 10 mg daily    #Pain management  - Celebrex 200 BID.   - Oxycodone 5mg & 10mg.   - Tizanidne 2mg PRN    GI/Bowel:  - At risk for constipation due to neurologic diagnosis, immobility and/or medication use  - Senna QHS, Miralax Daily  - GI ppx: Protonix    /Bladder:   - At risk for incontinence and retention due to neurologic diagnosis and limited mobility  - Continue catheter/bladder nursing protocol with bladder scans per routine with straight cath for >400cc.  - Encourage timed voids every 4 hours while awake for independence and to promote continence during therapy.      Skin/Pressure Injury:   - At risk for pressure injury due to neurologic diagnosis and relative immobility.  - Skin assessment on admission: Aquacel dressing in place on the LLE  - Turn every 2 hours while in bed, air mattress  - Soft heel protectors  - Skin barrier cream as needed  - Nursing to monitor skin Qshift    #FEN   - Diet: Regular     #DVT ppx  -  BID      Outpatient Follow-up (Specialty/Name of physician):    Jose Antonio Glover)  Orthopedics  [address] [place] [address]  Phone: [phone]  Fax: [phone]  Follow Up Time: 2 weeks        MEDICAL PROGNOSIS: GOOD            REHAB POTENTIAL: GOOD             ESTIMATED DISPOSITION: HOME WITH HOME CARE            ELOS: 17-21 Days   EXPECTED THERAPY:     P.T. 1hr/day       O.T. 1hr/day      S.L.P. 1hr/day     P&O Unnecessary     EXP FREQUENCY: 5 days per 7 day period     PRESCREEN COMPARISON:   I have reviewed the prescreen information and I have found no relevant changes between the preadmission screening and my post admission evaluation     RATIONALE FOR INPATIENT ADMISSION - Patient demonstrates the following: (check all that apply)  [X] Medically appropriate for rehabilitation admission  [X] Has attainable rehab goals with an appropriate initial discharge plan  [X] Has rehabilitation potential (expected to make a significant improvement within a reasonable period of time)   [X] Requires close medical management by a rehab physician, rehab nursing care, Hospitalist and comprehensive interdisciplinary team (including PT, OT, & or SLP, Prosthetics and Orthotics)         This is a 61yo Female with PMH of HTN, HLD, DM and OA who presents to [facility] in 5/22 for orthopedic surgery. Patient s/p left total knee arthroplasty with Dr. Glover on 5/22/2023. Patient tolerated the procedure well without any intraoperative complications. Patient tolerated physical therapy well, and the pain was controlled. Patient is weight bearing as tolerated with cane/walker as needed. Seen by medical attending for continuity of care and management and cleared for safe discharge. Any suture/staples to be removed on post-op day #14. Patient is on 325mg of ASA for DVT prophylaxis for total of 6 weeks.    # Primary Osteoarthritis of Left Knee S/P Total Joint Replacement Arthroplasty  - ADL and mobility impairment  - Need for assistance with personal care  - Patient is weight bearing as tolerated with cane/walker as needed.   - 325mg of ASA BID for DVT prophylaxis, please take for 6 weeks.   - pain management as below.   - begin comprehensive rehab program OT, PT, SLP  3 hours daily 5 x week  - Precautions: fall.     #Obesity    #HTN  - Losartan 50 mg  - HCTZ 12.5mg  - monitor vitals, hospitalist consult    #DM   -ISS   -Monitor glucose   -Take Trulicity at home 1x a week. Daughter states she can bring in it.     #HLD  - Atorvastatin 10 mg daily    #Pain management  - Celebrex 200 BID.   - Oxycodone 5mg & 10mg.   - Tizanidne 2mg PRN    GI/Bowel:  - At risk for constipation due to neurologic diagnosis, immobility and/or medication use  - Senna QHS, Miralax Daily  - GI ppx: Protonix    /Bladder:   - At risk for incontinence and retention due to neurologic diagnosis and limited mobility  - Continue catheter/bladder nursing protocol with bladder scans per routine with straight cath for >400cc.  - Encourage timed voids every 4 hours while awake for independence and to promote continence during therapy.      Skin/Pressure Injury:   - At risk for pressure injury due to neurologic diagnosis and relative immobility.  - Skin assessment on admission: Aquacel dressing in place on the LLE  - Turn every 2 hours while in bed, air mattress  - Soft heel protectors  - Skin barrier cream as needed  - Nursing to monitor skin Qshift    #FEN   - Diet: Regular     #DVT ppx  -  BID      Outpatient Follow-up (Specialty/Name of physician):    Jose Antonio Glover)  Orthopedics  [address] [place] [address]  Phone: [phone]  Fax: [phone]  Follow Up Time: 2 weeks        MEDICAL PROGNOSIS: GOOD            REHAB POTENTIAL: GOOD             ESTIMATED DISPOSITION: HOME WITH HOME CARE            ELOS: 17-21 Days   EXPECTED THERAPY:     P.T. 1hr/day       O.T. 1hr/day      S.L.P. 1hr/day     P&O Unnecessary     EXP FREQUENCY: 5 days per 7 day period     PRESCREEN COMPARISON:   I have reviewed the prescreen information and I have found no relevant changes between the preadmission screening and my post admission evaluation     RATIONALE FOR INPATIENT ADMISSION - Patient demonstrates the following: (check all that apply)  [X] Medically appropriate for rehabilitation admission  [X] Has attainable rehab goals with an appropriate initial discharge plan  [X] Has rehabilitation potential (expected to make a significant improvement within a reasonable period of time)   [X] Requires close medical management by a rehab physician, rehab nursing care, Hospitalist and comprehensive interdisciplinary team (including PT, OT, & or SLP, Prosthetics and Orthotics)

## 2023-05-24 NOTE — H&P ADULT - REASON FOR ADMISSION
L total knee replacement. Left Total Knee Replacement Arthroplasty Primary Osteoarthritis of Left Knee S/P Total Joint Replacement Arthroplasty

## 2023-05-24 NOTE — DIETITIAN INITIAL EVALUATION ADULT - OTHER INFO
RD visited with patient for requested consult. RD offered , however, patient requested daughter to translate.  Appetite reported as fair to good, PTA, appetite was limited by pain, but denies any weight changes PTA.  No reported chewing or swallowing difficulties. No reported food allergies.  Reported to follow a no concentrated sweets diet PTA.  No questions or concerns at this time. RD visited with patient for requested consult. RD offered , however, patient requested daughter to translate.  Appetite reported as fair to good, PTA, appetite was limited by pain, but denies any weight changes PTA, ubw ~200 pounds.  No reported chewing or swallowing difficulties. No reported food allergies.  Reported to follow a no concentrated sweets diet PTA and takes Trulicity for diabetes management.  No questions or concerns at this time. Reviewed current diet - CSTCHO.

## 2023-05-24 NOTE — DIETITIAN INITIAL EVALUATION ADULT - PERTINENT LABORATORY DATA
05-23    139  |  104  |  11  ----------------------------<  84  3.6   |  24  |  0.66    Ca    8.3<L>      23 May 2023 05:35    A1C with Estimated Average Glucose Result: 5.4 % (05-17-23 @ 09:30)    CAPILLARY BLOOD GLUCOSE  POCT Blood Glucose.: 128 mg/dL (24 May 2023 07:18)  POCT Blood Glucose.: 156 mg/dL (23 May 2023 22:36)  POCT Blood Glucose.: 105 mg/dL (23 May 2023 16:40)  POCT Blood Glucose.: 105 mg/dL (23 May 2023 11:30)

## 2023-05-24 NOTE — DIETITIAN INITIAL EVALUATION ADULT - PERTINENT MEDS FT
MEDICATIONS  (STANDING):  acetaminophen   IVPB .. 1000 milliGRAM(s) IV Intermittent once  aspirin 325 milliGRAM(s) Oral two times a day  atorvastatin 10 milliGRAM(s) Oral at bedtime  celecoxib 200 milliGRAM(s) Oral two times a day  dextrose 5%. 1000 milliLiter(s) (100 mL/Hr) IV Continuous <Continuous>  dextrose 5%. 1000 milliLiter(s) (50 mL/Hr) IV Continuous <Continuous>  dextrose 50% Injectable 25 Gram(s) IV Push once  dextrose 50% Injectable 12.5 Gram(s) IV Push once  dextrose 50% Injectable 25 Gram(s) IV Push once  folic acid 1 milliGRAM(s) Oral daily  glucagon  Injectable 1 milliGRAM(s) IntraMuscular once  hydrochlorothiazide 12.5 milliGRAM(s) Oral daily  insulin lispro (ADMELOG) corrective regimen sliding scale   SubCutaneous three times a day before meals  insulin lispro (ADMELOG) corrective regimen sliding scale   SubCutaneous at bedtime  lactated ringers. 1000 milliLiter(s) (30 mL/Hr) IV Continuous <Continuous>  losartan 50 milliGRAM(s) Oral daily  multivitamin 1 Tablet(s) Oral daily  pantoprazole    Tablet 40 milliGRAM(s) Oral before breakfast  polyethylene glycol 3350 17 Gram(s) Oral at bedtime  senna 2 Tablet(s) Oral at bedtime  sodium chloride 0.9% lock flush 3 milliLiter(s) IV Push every 8 hours    MEDICATIONS  (PRN):  dextrose Oral Gel 15 Gram(s) Oral once PRN Blood Glucose LESS THAN 70 milliGRAM(s)/deciliter  magnesium hydroxide Suspension 30 milliLiter(s) Oral daily PRN Constipation  ondansetron Injectable 4 milliGRAM(s) IV Push every 6 hours PRN Nausea and/or Vomiting  oxyCODONE    IR 5 milliGRAM(s) Oral every 4 hours PRN Moderate Pain (4 - 6)  oxyCODONE    IR 10 milliGRAM(s) Oral every 4 hours PRN Severe Pain (7 - 10)  traMADol 50 milliGRAM(s) Oral every 6 hours PRN Mild Pain (1 - 3)

## 2023-05-24 NOTE — PATIENT PROFILE ADULT - FALL HARM RISK - RISK INTERVENTIONS
Assistance OOB with selected safe patient handling equipment/Assistance with ambulation/Communicate Fall Risk and Risk Factors to all staff, patient, and family/Discuss with provider need for PT consult/Monitor gait and stability/Provide patient with walking aids - walker, cane, crutches/Reinforce activity limits and safety measures with patient and family/Reorient to person, place and time as needed/Sit up slowly, dangle for a short time, stand at bedside before walking/Use of alarms - bed, chair and/or voice tab/Visual Cue: Yellow wristband/Bed in lowest position, wheels locked, appropriate side rails in place/Call bell, personal items and telephone in reach/Instruct patient to call for assistance before getting out of bed or chair/Non-slip footwear when patient is out of bed/[place] to call system/Physically safe environment - no spills, clutter or unnecessary equipment/Purposeful Proactive Rounding/Room/bathroom lighting operational, light cord in reach

## 2023-05-24 NOTE — H&P ADULT - NSHPREVIEWOFSYSTEMS_GEN_ALL_CORE
REVIEW OF SYSTEMS  Constitutional: No fever, No Chills, No fatigue  HEENT: No eye pain, No visual disturbances, No difficulty hearing  Pulm: No cough,  No shortness of breath  Cardio: No chest pain, No palpitations  GI:  No abdominal pain, No nausea, No vomiting, No diarrhea, No constipation  : No dysuria, No frequency, No hematuria  Neuro: No headaches, No memory loss, + loss of strength, No numbness, No tremors  Skin: No itching, No rashes, No lesions   Endo: No temperature intolerance  MSK: + joint pain, + joint swelling, No muscle pain, No Neck or back pain  Psych:  No depression, No anxiety Constitutional: No fever, No Chills, No fatigue  HEENT: No eye pain, No visual disturbances, No difficulty hearing  Pulm: No cough,  No shortness of breath  Cardio: No chest pain, No palpitations  GI:  No abdominal pain, No nausea, No vomiting, No diarrhea, No constipation  : No dysuria, No frequency, No hematuria  Neuro: No headaches, No memory loss, + loss of strength, No numbness, No tremors  Skin: No itching, No rashes, No lesions   Endo: No temperature intolerance  MSK: + joint pain, + joint swelling, No muscle pain, No Neck or back pain  Psych:  No depression, No anxiety

## 2023-05-24 NOTE — PATIENT PROFILE ADULT - LIVING ENVIRONMENT
Pt states he has had no relief from symptoms and is still extremely nauseas, dizzy, and weak. ERP notified and pt medicated as per MAR.     no

## 2023-05-24 NOTE — H&P ADULT - ATTENDING COMMENTS
I independently performed the documented the history, exam, and medical decision making. I have made amendments to the documentation where necessary. I have personally seen and examined the patient. Medical records were reviewed and I have made amendments to the documentation where necessary and adjusted the history, physical examination, and plan as documented by the Resident Physician. Admission vitals, labs, and physical exam are outlined below.    Vital Signs Last 24 Hrs  T(C): 37.3 (24 May 2023 20:35), Max: 37.5 (24 May 2023 18:44)  T(F): 99.1 (24 May 2023 20:35), Max: 99.5 (24 May 2023 18:44)  HR: 69 (25 May 2023 05:48) (69 - 80)  BP: 146/75 (25 May 2023 05:48) (125/55 - 150/83)  RR: 18 (24 May 2023 20:35) (17 - 18)  SpO2: 97% (24 May 2023 20:35) (96% - 100%)      LAB                        8.8    8.79  )-----------( 236      ( 25 May 2023 05:35 )             26.5     05-25    138  |  101  |  10  ----------------------------<  107<H>  3.6   |  30  |  0.79    Ca    8.3<L>      25 May 2023 05:35    TPro  5.9<L>  /  Alb  2.3<L>  /  TBili  0.8  /  DBili  x   /  AST  13  /  ALT  15  /  AlkPhos  56  05-25    LIVER FUNCTIONS - ( 25 May 2023 05:35 )  Alb: 2.3 g/dL / Pro: 5.9 g/dL / ALK PHOS: 56 U/L / ALT: 15 U/L / AST: 13 U/L / GGT: x             PHYSICAL EXAM  Constitutional - NAD, Comfortable  HEENT - NCAT, EOMI  Neck - Supple, No limited ROM  Chest - good chest expansion, good respiratory effort, CTAB   Cardio - warm and well perfused  Abdomen -  Soft, NTND  Extremities - No peripheral edema, No calf tenderness. Aquacel dressing in place on the LLE.   Neurologic Exam:                    Cognitive -             Orientation: Awake, Alert, AAO to self, place, date, year, situation            Thought: process, content appropriate     Speech - Fluent, Comprehensible, No dysarthria, No aphasia      Cranial Nerves - No facial asymmetry, Tongue midline, EOMI, Shoulder shrug intact     Motor -                      LEFT    UE - ShAB 5/5, EF 5/5, EE 5/5, WE 5/5,  WNL                    RIGHT UE - ShAB 5/5, EF 5/5, EE 5/5, WE 5/5,  WNL                    LEFT    LE - HF 3/5, KE 3/5, DF 4/5, PF 4/5                    RIGHT LE - HF 5/5, KE 5/5, DF 5/5, PF 5/5        Sensory - Intact to LT bilateral     OculoVestibular -  No nystagmus  Psychiatric - Mood stable, Affect WNL  Skin on admission: Aquacel dressing in place on the LLE. otherwise the skin is warm dry & intact.

## 2023-05-25 LAB
ALBUMIN SERPL ELPH-MCNC: 2.3 G/DL — LOW (ref 3.3–5)
ALP SERPL-CCNC: 56 U/L — SIGNIFICANT CHANGE UP (ref 40–120)
ALT FLD-CCNC: 15 U/L — SIGNIFICANT CHANGE UP (ref 10–45)
ANION GAP SERPL CALC-SCNC: 7 MMOL/L — SIGNIFICANT CHANGE UP (ref 5–17)
AST SERPL-CCNC: 13 U/L — SIGNIFICANT CHANGE UP (ref 10–40)
BILIRUB SERPL-MCNC: 0.8 MG/DL — SIGNIFICANT CHANGE UP (ref 0.2–1.2)
BUN SERPL-MCNC: 10 MG/DL — SIGNIFICANT CHANGE UP (ref 7–23)
CALCIUM SERPL-MCNC: 8.3 MG/DL — LOW (ref 8.4–10.5)
CHLORIDE SERPL-SCNC: 101 MMOL/L — SIGNIFICANT CHANGE UP (ref 96–108)
CO2 SERPL-SCNC: 30 MMOL/L — SIGNIFICANT CHANGE UP (ref 22–31)
CREAT SERPL-MCNC: 0.79 MG/DL — SIGNIFICANT CHANGE UP (ref 0.5–1.3)
EGFR: 84 ML/MIN/1.73M2 — SIGNIFICANT CHANGE UP
GLUCOSE BLDC GLUCOMTR-MCNC: 114 MG/DL — HIGH (ref 70–99)
GLUCOSE BLDC GLUCOMTR-MCNC: 117 MG/DL — HIGH (ref 70–99)
GLUCOSE BLDC GLUCOMTR-MCNC: 120 MG/DL — HIGH (ref 70–99)
GLUCOSE BLDC GLUCOMTR-MCNC: 138 MG/DL — HIGH (ref 70–99)
GLUCOSE SERPL-MCNC: 107 MG/DL — HIGH (ref 70–99)
HCT VFR BLD CALC: 26.5 % — LOW (ref 34.5–45)
HCV AB S/CO SERPL IA: 0.08 S/CO — SIGNIFICANT CHANGE UP (ref 0–0.99)
HCV AB SERPL-IMP: SIGNIFICANT CHANGE UP
HGB BLD-MCNC: 8.8 G/DL — LOW (ref 11.5–15.5)
MCHC RBC-ENTMCNC: 29.8 PG — SIGNIFICANT CHANGE UP (ref 27–34)
MCHC RBC-ENTMCNC: 33.2 GM/DL — SIGNIFICANT CHANGE UP (ref 32–36)
MCV RBC AUTO: 89.8 FL — SIGNIFICANT CHANGE UP (ref 80–100)
NRBC # BLD: 0 /100 WBCS — SIGNIFICANT CHANGE UP (ref 0–0)
PLATELET # BLD AUTO: 236 K/UL — SIGNIFICANT CHANGE UP (ref 150–400)
POTASSIUM SERPL-MCNC: 3.6 MMOL/L — SIGNIFICANT CHANGE UP (ref 3.5–5.3)
POTASSIUM SERPL-SCNC: 3.6 MMOL/L — SIGNIFICANT CHANGE UP (ref 3.5–5.3)
PROT SERPL-MCNC: 5.9 G/DL — LOW (ref 6–8.3)
RBC # BLD: 2.95 M/UL — LOW (ref 3.8–5.2)
RBC # FLD: 12.9 % — SIGNIFICANT CHANGE UP (ref 10.3–14.5)
SODIUM SERPL-SCNC: 138 MMOL/L — SIGNIFICANT CHANGE UP (ref 135–145)
WBC # BLD: 8.79 K/UL — SIGNIFICANT CHANGE UP (ref 3.8–10.5)
WBC # FLD AUTO: 8.79 K/UL — SIGNIFICANT CHANGE UP (ref 3.8–10.5)

## 2023-05-25 PROCEDURE — 99255 IP/OBS CONSLTJ NEW/EST HI 80: CPT

## 2023-05-25 PROCEDURE — 99223 1ST HOSP IP/OBS HIGH 75: CPT

## 2023-05-25 RX ORDER — SENNA PLUS 8.6 MG/1
2 TABLET ORAL AT BEDTIME
Refills: 0 | Status: DISCONTINUED | OUTPATIENT
Start: 2023-05-25 | End: 2023-06-01

## 2023-05-25 RX ADMIN — CELECOXIB 200 MILLIGRAM(S): 200 CAPSULE ORAL at 05:49

## 2023-05-25 RX ADMIN — LOSARTAN POTASSIUM 50 MILLIGRAM(S): 100 TABLET, FILM COATED ORAL at 05:49

## 2023-05-25 RX ADMIN — ATORVASTATIN CALCIUM 10 MILLIGRAM(S): 80 TABLET, FILM COATED ORAL at 22:55

## 2023-05-25 RX ADMIN — OXYCODONE HYDROCHLORIDE 10 MILLIGRAM(S): 5 TABLET ORAL at 22:55

## 2023-05-25 RX ADMIN — TRAMADOL HYDROCHLORIDE 50 MILLIGRAM(S): 50 TABLET ORAL at 10:45

## 2023-05-25 RX ADMIN — CELECOXIB 200 MILLIGRAM(S): 200 CAPSULE ORAL at 17:40

## 2023-05-25 RX ADMIN — SENNA PLUS 2 TABLET(S): 8.6 TABLET ORAL at 22:56

## 2023-05-25 RX ADMIN — PANTOPRAZOLE SODIUM 40 MILLIGRAM(S): 20 TABLET, DELAYED RELEASE ORAL at 06:13

## 2023-05-25 RX ADMIN — OXYCODONE HYDROCHLORIDE 10 MILLIGRAM(S): 5 TABLET ORAL at 23:40

## 2023-05-25 RX ADMIN — Medication 325 MILLIGRAM(S): at 05:49

## 2023-05-25 RX ADMIN — OXYCODONE HYDROCHLORIDE 10 MILLIGRAM(S): 5 TABLET ORAL at 06:55

## 2023-05-25 RX ADMIN — TRAMADOL HYDROCHLORIDE 50 MILLIGRAM(S): 50 TABLET ORAL at 11:15

## 2023-05-25 RX ADMIN — Medication 1 TABLET(S): at 12:14

## 2023-05-25 RX ADMIN — Medication 1 MILLIGRAM(S): at 12:14

## 2023-05-25 RX ADMIN — Medication 10 MILLIGRAM(S): at 15:23

## 2023-05-25 RX ADMIN — Medication 325 MILLIGRAM(S): at 17:40

## 2023-05-25 RX ADMIN — OXYCODONE HYDROCHLORIDE 10 MILLIGRAM(S): 5 TABLET ORAL at 05:55

## 2023-05-25 RX ADMIN — TRAMADOL HYDROCHLORIDE 50 MILLIGRAM(S): 50 TABLET ORAL at 17:41

## 2023-05-25 RX ADMIN — CELECOXIB 200 MILLIGRAM(S): 200 CAPSULE ORAL at 06:49

## 2023-05-25 NOTE — DIETITIAN INITIAL EVALUATION ADULT - ADD RECOMMEND
1) Monitor Weights, Intake, Tolerance, Skin, POCT & Labwork  2) Nutrition Education Provided on Consistent Carbohydrate Diet   3) Continue Nutrition Plan of Care

## 2023-05-25 NOTE — DIETITIAN INITIAL EVALUATION ADULT - PERTINENT MEDS FT
MEDICATIONS  (STANDING):  aspirin 325 milliGRAM(s) Oral two times a day  atorvastatin 10 milliGRAM(s) Oral at bedtime  celecoxib 200 milliGRAM(s) Oral two times a day  dextrose 5%. 1000 milliLiter(s) (50 mL/Hr) IV Continuous <Continuous>  dextrose 5%. 1000 milliLiter(s) (100 mL/Hr) IV Continuous <Continuous>  dextrose 50% Injectable 25 Gram(s) IV Push once  dextrose 50% Injectable 12.5 Gram(s) IV Push once  dextrose 50% Injectable 25 Gram(s) IV Push once  folic acid 1 milliGRAM(s) Oral daily  glucagon  Injectable 1 milliGRAM(s) IntraMuscular once  hydrochlorothiazide 12.5 milliGRAM(s) Oral daily  insulin lispro (ADMELOG) corrective regimen sliding scale   SubCutaneous three times a day before meals  insulin lispro (ADMELOG) corrective regimen sliding scale   SubCutaneous at bedtime  losartan 50 milliGRAM(s) Oral daily  multivitamin 1 Tablet(s) Oral daily  pantoprazole    Tablet 40 milliGRAM(s) Oral before breakfast  polyethylene glycol 3350 17 Gram(s) Oral at bedtime    MEDICATIONS  (PRN):  dextrose Oral Gel 15 Gram(s) Oral once PRN Blood Glucose LESS THAN 70 milliGRAM(s)/deciliter  oxyCODONE    IR 5 milliGRAM(s) Oral every 4 hours PRN Moderate Pain (4 - 6)  oxyCODONE    IR 10 milliGRAM(s) Oral every 4 hours PRN Severe Pain (7 - 10)  traMADol 50 milliGRAM(s) Oral every 6 hours PRN Mild Pain (1 - 3)

## 2023-05-25 NOTE — DIETITIAN INITIAL EVALUATION ADULT - OTHER INFO
Initial Nutrition Assessment   62yr Old Female   Denies Food Allergy/Intolerance  Tolerates Diet Well - No Chewing/Swallowing Complications (Per Patient's Family)  No Pressure Ulcers (as Per Nursing Flow Sheets)  No Edema Noted (as Per Nursing Flow Sheets)  No Recent Nausea/Vomiting/Diarrhea & Some Recent Constipation (as Per Patient)

## 2023-05-25 NOTE — CONSULT NOTE ADULT - ASSESSMENT
# Left Total Knee Replacement Arthroplasty  -WBAT  - 325mg of ASA BID for DVT ppx x6 weeks. Take w/ PPI for stomach protection.  - PT/OT/pain management, bowel regimen per PMR  - fall precautions    #HTN  - Losartan 50 mg  - HCTZ 12.5mg  - will follow BP    #HLD  - Atorvastatin 10 mg daily    #DM2  -[id number]  -ISS   -Take Trulicity at home 1x a week - not approved for hospital use 61yo Female with PMH of HTN, HLD, DM and OA, s/p elective left total knee arthroplasty with Dr. Glover on 5/22/2023.   Admitted to [facility] on 5/24/2023.    # Left Knee OA s/p Left Total Knee Replacement Arthroplasty  -WBAT  - 325mg of ASA BID for DVT ppx x6 weeks. Take w/ PPI for stomach protection.  - Any suture/staples to be removed on post-op day #14 (6/5/2023).   - PT/OT/pain management, bowel regimen per PMR  - fall precautions    # acute post operative blood loss anemia on top of normocytic anemia  - trend h/h  - pt anemic prior to surgery on 5/17, hemoglobin was 10.1  - will d/w patient if she has a known hx of anemia. Anemia w/u with next bloodwork.    #HTN  - per daughter, home regimen - losartan-hctz 100-12.5   -continue current in hospital regimen, adjust as appropriate:  - Losartan 50 mg  - HCTZ 12.5mg  - will follow BP    #HLD  - Atorvastatin 10 mg daily    #DM2  -[id number] ordered for AM  -ISS, blood sugars have been controlled  -Take Trulicity at home 1x a week - not approved for hospital use    # Constipation  - miralax  - Additionally added senna and suppository. 61yo Female with PMH of HTN, HLD, DM and OA, s/p elective left total knee arthroplasty with Dr. Glover on 5/22/2023.   Admitted to [facility] on 5/24/2023.    # Left Knee OA s/p Left Total Knee Replacement Arthroplasty  -WBAT  - 325mg of ASA BID for DVT ppx x6 weeks. Take w/ PPI for stomach protection.  - Any suture/staples to be removed on post-op day #14 (6/5/2023).   - PT/OT/pain management, bowel regimen per PMR  - fall precautions    # acute post operative blood loss anemia on top of normocytic anemia  - trend h/h  - pt anemic prior to surgery on 5/17, hemoglobin was 10.1  - will d/w patient if she has a known hx of anemia. Anemia w/u with next bloodwork.    #HTN  - per daughter, home regimen - losartan-hctz 100-12.5   -continue current in hospital regimen, adjust as appropriate:  - Losartan 50 mg  - HCTZ 12.5mg  - will follow BP    #HLD  - Atorvastatin 10 mg daily    #DM2  -[id number] ordered for AM  -ISS, blood sugars have been controlled  -Take Trulicity at home 1x a week - not approved for hospital use    # Constipation  - miralax  - Additionally added senna and suppository.    81 min spent

## 2023-05-25 NOTE — DIETITIAN INITIAL EVALUATION ADULT - ORAL INTAKE PTA/DIET HISTORY
Patient Does  Vegetarian Diet @Home But Will Consume Dairy (No Fish & No Eggs)  Consumes 2 Meals a Day - States Fast on Thursday   Usually Cooks For Self  Does Take Vitamin/Supplements @Home (Multivitamin, Calcium, Folic Acid)

## 2023-05-25 NOTE — DIETITIAN INITIAL EVALUATION ADULT - PERTINENT LABORATORY DATA
Hypokalemia
05-25    138  |  101  |  10  ----------------------------<  107<H>  3.6   |  30  |  0.79    Ca    8.3<L>      25 May 2023 05:35    TPro  5.9<L>  /  Alb  2.3<L>  /  TBili  0.8  /  DBili  x   /  AST  13  /  ALT  15  /  AlkPhos  56  05-25  POCT Blood Glucose.: 117 mg/dL (05-25-23 @ 07:45)  A1C with Estimated Average Glucose Result: 5.4 % (05-17-23 @ 09:30)

## 2023-05-25 NOTE — DIETITIAN INITIAL EVALUATION ADULT - NS FNS DIET ORDER
on Consistent Carbohydrate Diet w/ Thin Liquids (IDDSI Level 0) - Wishes to Continue on Consistent Carbohydrate Diet Despite WNL Hemoglobin A1c    Nutrition Education Provided on Consistent Carbohydrate Diet

## 2023-05-25 NOTE — DIETITIAN INITIAL EVALUATION ADULT - REASON INDICATOR FOR ASSESSMENT
Initial Assessment - Medical Consult - 24hr - Assessment  /Translation Phone Services Used  ID# [id number]   Family Contacted via Phone (Daughter)

## 2023-05-26 DIAGNOSIS — Z96.652 PRESENCE OF LEFT ARTIFICIAL KNEE JOINT: ICD-10-CM

## 2023-05-26 LAB
A1C WITH ESTIMATED AVERAGE GLUCOSE RESULT: 5.6 % — SIGNIFICANT CHANGE UP (ref 4–5.6)
ESTIMATED AVERAGE GLUCOSE: 114 MG/DL — SIGNIFICANT CHANGE UP (ref 68–114)
FERRITIN SERPL-MCNC: 156 NG/ML — HIGH (ref 15–150)
FOLATE SERPL-MCNC: >20 NG/ML — SIGNIFICANT CHANGE UP
GLUCOSE BLDC GLUCOMTR-MCNC: 108 MG/DL — HIGH (ref 70–99)
GLUCOSE BLDC GLUCOMTR-MCNC: 121 MG/DL — HIGH (ref 70–99)
GLUCOSE BLDC GLUCOMTR-MCNC: 137 MG/DL — HIGH (ref 70–99)
GLUCOSE BLDC GLUCOMTR-MCNC: 175 MG/DL — HIGH (ref 70–99)
IRON SATN MFR SERPL: 10 % — LOW (ref 14–50)
IRON SATN MFR SERPL: 21 UG/DL — LOW (ref 30–160)
TIBC SERPL-MCNC: 211 UG/DL — LOW (ref 220–430)
TRANSFERRIN SERPL-MCNC: 187 MG/DL — LOW (ref 200–360)
UIBC SERPL-MCNC: 189 UG/DL — SIGNIFICANT CHANGE UP (ref 110–370)
VIT B12 SERPL-MCNC: 790 PG/ML — SIGNIFICANT CHANGE UP (ref 232–1245)

## 2023-05-26 PROCEDURE — 99232 SBSQ HOSP IP/OBS MODERATE 35: CPT

## 2023-05-26 RX ORDER — ACETAMINOPHEN 500 MG
975 TABLET ORAL EVERY 12 HOURS
Refills: 0 | Status: DISCONTINUED | OUTPATIENT
Start: 2023-05-26 | End: 2023-06-01

## 2023-05-26 RX ADMIN — SENNA PLUS 2 TABLET(S): 8.6 TABLET ORAL at 21:41

## 2023-05-26 RX ADMIN — CELECOXIB 200 MILLIGRAM(S): 200 CAPSULE ORAL at 06:10

## 2023-05-26 RX ADMIN — ATORVASTATIN CALCIUM 10 MILLIGRAM(S): 80 TABLET, FILM COATED ORAL at 21:41

## 2023-05-26 RX ADMIN — Medication 975 MILLIGRAM(S): at 17:12

## 2023-05-26 RX ADMIN — OXYCODONE HYDROCHLORIDE 5 MILLIGRAM(S): 5 TABLET ORAL at 21:48

## 2023-05-26 RX ADMIN — Medication 975 MILLIGRAM(S): at 17:13

## 2023-05-26 RX ADMIN — Medication 325 MILLIGRAM(S): at 17:13

## 2023-05-26 RX ADMIN — Medication 325 MILLIGRAM(S): at 06:10

## 2023-05-26 RX ADMIN — CELECOXIB 200 MILLIGRAM(S): 200 CAPSULE ORAL at 17:12

## 2023-05-26 RX ADMIN — Medication 1: at 07:31

## 2023-05-26 RX ADMIN — Medication 1 TABLET(S): at 11:54

## 2023-05-26 RX ADMIN — LOSARTAN POTASSIUM 50 MILLIGRAM(S): 100 TABLET, FILM COATED ORAL at 07:31

## 2023-05-26 RX ADMIN — OXYCODONE HYDROCHLORIDE 10 MILLIGRAM(S): 5 TABLET ORAL at 08:00

## 2023-05-26 RX ADMIN — OXYCODONE HYDROCHLORIDE 10 MILLIGRAM(S): 5 TABLET ORAL at 07:31

## 2023-05-26 RX ADMIN — CELECOXIB 200 MILLIGRAM(S): 200 CAPSULE ORAL at 07:28

## 2023-05-26 RX ADMIN — PANTOPRAZOLE SODIUM 40 MILLIGRAM(S): 20 TABLET, DELAYED RELEASE ORAL at 06:10

## 2023-05-26 RX ADMIN — CELECOXIB 200 MILLIGRAM(S): 200 CAPSULE ORAL at 17:13

## 2023-05-26 RX ADMIN — Medication 1 MILLIGRAM(S): at 11:54

## 2023-05-26 RX ADMIN — OXYCODONE HYDROCHLORIDE 5 MILLIGRAM(S): 5 TABLET ORAL at 22:27

## 2023-05-26 NOTE — PROGRESS NOTE ADULT - ASSESSMENT
This is a 63yo Female with PMH of HTN, HLD, DM and OA who presents to [facility] in 5/22 for orthopedic surgery. Patient s/p left total knee arthroplasty with Dr. Glover on 5/22/2023. Patient tolerated the procedure well without any intraoperative complications. Patient tolerated physical therapy well, and the pain was controlled. Patient is weight bearing as tolerated with cane/walker as needed. Seen by medical attending for continuity of care and management and cleared for safe discharge. Any suture/staples to be removed on post-op day #14. Patient is on 325mg of ASA for DVT prophylaxis for total of 6 weeks.    # Primary Osteoarthritis of Left Knee S/P Total Joint Replacement Arthroplasty  - ADL and mobility impairment  - Need for assistance with personal care  - Patient is weight bearing as tolerated with cane/walker as needed.   - 325mg of ASA BID for DVT prophylaxis, for 6 weeks.   - begin comprehensive rehab program OT, PT, SLP  3 hours daily 5 x week  - Precautions: fall.     #Obesity    #HTN  - Losartan 50 mg  - HCTZ 12.5mg  - monitor vitals, hospitalist in    #DM   -ISS   -Monitor glucose   -Take Trulicity at home 1x a week. Daughter states she can bring in it.     #HLD  - Atorvastatin 10 mg daily    #Pain management  - Celebrex 200 BID.   - Oxycodone 5mg & 10mg.   - Tizanidne 2mg PRN    GI/Bowel:  - At risk for constipation due to neurologic diagnosis, immobility and/or medication use  - Senna QHS, Miralax Daily  - GI ppx: Protonix    /Bladder:   - At risk for incontinence and retention due to neurologic diagnosis and limited mobility  - Continue catheter/bladder nursing protocol with bladder scans per routine with straight cath for >400cc.  - Encourage timed voids every 4 hours while awake for independence and to promote continence during therapy.      Skin/Pressure Injury:   - At risk for pressure injury due to neurologic diagnosis and relative immobility.  - Skin assessment on admission: Aquacel dressing in place on the LLE  - Turn every 2 hours while in bed, air mattress  - Soft heel protectors  - Skin barrier cream as needed  - Nursing to monitor skin Qshift    #FEN   - Diet: Regular     #DVT ppx  -  BID      Outpatient Follow-up (Specialty/Name of physician):    Jose Antonio Glover)  Orthopedics  [address] [place] [address]  Phone: [phone]  Fax: [phone]  Follow Up Time: 2 weeks

## 2023-05-26 NOTE — PROGRESS NOTE ADULT - ASSESSMENT
63yo Female with PMH of HTN, HLD, DM and OA, s/p elective left total knee arthroplasty with Dr. Glover on 5/22/2023.   Admitted to [facility] on 5/24/2023.    # Left Knee OA s/p Left Total Knee Replacement Arthroplasty  -WBAT  - 325mg of ASA BID for DVT ppx x6 weeks. Take w/ PPI for stomach protection.  - Any suture/staples to be removed on post-op day #14 (6/5/2023).   - PT/OT/pain management, bowel regimen per PMR  - fall precautions    # acute post operative blood loss anemia on top of normocytic anemia  - discussed w/ pt and daughter, pt has known  history of anemia with extensive outpatient work up.  - trend h/h    #HTN  - per daughter, home regimen - losartan-hctz 100-12.5   -continue current in hospital regimen, adjust as appropriate:  - Losartan 50 mg  - HCTZ 12.5mg  - will follow BP    #HLD  - Atorvastatin 10 mg daily    #DM2  -[id number] ordered for AM  -ISS, blood sugars have been controlled  -Take Trulicity at home 1x a week - not approved for hospital use    # Constipation  - miralax  - Additionally added senna and suppository.

## 2023-05-26 NOTE — PROGRESS NOTE ADULT - SUBJECTIVE AND OBJECTIVE BOX
HPI:  This is a 61yo Female with PMH of HTN, HLD, DM and OA who presents to [facility] in 5/22 for orthopedic surgery. Patient s/p left total knee arthroplasty with Dr. Glover on 5/22/2023. Patient tolerated the procedure well without any intraoperative complications. Patient tolerated physical therapy well, and the pain was controlled. Patient is weight bearing as tolerated with cane/walker as needed. Seen by medical attending for continuity of care and management and cleared for safe discharge. Any suture/staples to be removed on post-op day #14. Patient is on 325mg of ASA for DVT prophylaxis for total of 6 weeks.    Patient was evaluated by PM&R and therapy for functional deficits, gait/ADL impairments and acute rehabilitation was recommended. Patient was medically optimized for discharge to [facility] IRU on 5/24/23.      S: NAD overnight, no new symptoms, working with therapy. Knee pain 6/10 ambulating, incision CDI. Denies any new symptoms.     VITALS  Vital Signs Last 24 Hrs  T(C): 36.8 (26 May 2023 08:06), Max: 37 (25 May 2023 20:13)  T(F): 98.2 (26 May 2023 08:06), Max: 98.6 (25 May 2023 20:13)  HR: 69 (26 May 2023 08:06) (69 - 80)  BP: 111/66 (26 May 2023 08:06) (99/62 - 130/61)  BP(mean): --  RR: 14 (26 May 2023 08:06) (14 - 15)  SpO2: 98% (26 May 2023 08:06) (94% - 98%)    Parameters below as of 26 May 2023 08:06  Patient On (Oxygen Delivery Method): room air      RECENT LABS                        8.8    8.79  )-----------( 236      ( 25 May 2023 05:35 )             26.5     05-25    138  |  101  |  10  ----------------------------<  107<H>  3.6   |  30  |  0.79    Ca    8.3<L>      25 May 2023 05:35    TPro  5.9<L>  /  Alb  2.3<L>  /  TBili  0.8  /  DBili  x   /  AST  13  /  ALT  15  /  AlkPhos  56  05-25      LIVER FUNCTIONS - ( 25 May 2023 05:35 )  Alb: 2.3 g/dL / Pro: 5.9 g/dL / ALK PHOS: 56 U/L / ALT: 15 U/L / AST: 13 U/L / GGT: x             CURRENT MEDICATIONS  MEDICATIONS  (STANDING):  aspirin 325 milliGRAM(s) Oral two times a day  atorvastatin 10 milliGRAM(s) Oral at bedtime  celecoxib 200 milliGRAM(s) Oral two times a day  dextrose 5%. 1000 milliLiter(s) (50 mL/Hr) IV Continuous <Continuous>  dextrose 5%. 1000 milliLiter(s) (100 mL/Hr) IV Continuous <Continuous>  dextrose 50% Injectable 25 Gram(s) IV Push once  dextrose 50% Injectable 12.5 Gram(s) IV Push once  dextrose 50% Injectable 25 Gram(s) IV Push once  folic acid 1 milliGRAM(s) Oral daily  glucagon  Injectable 1 milliGRAM(s) IntraMuscular once  hydrochlorothiazide 12.5 milliGRAM(s) Oral daily  insulin lispro (ADMELOG) corrective regimen sliding scale   SubCutaneous three times a day before meals  insulin lispro (ADMELOG) corrective regimen sliding scale   SubCutaneous at bedtime  losartan 50 milliGRAM(s) Oral daily  multivitamin 1 Tablet(s) Oral daily  pantoprazole    Tablet 40 milliGRAM(s) Oral before breakfast  polyethylene glycol 3350 17 Gram(s) Oral at bedtime  senna 2 Tablet(s) Oral at bedtime    MEDICATIONS  (PRN):  dextrose Oral Gel 15 Gram(s) Oral once PRN Blood Glucose LESS THAN 70 milliGRAM(s)/deciliter  oxyCODONE    IR 5 milliGRAM(s) Oral every 4 hours PRN Moderate Pain (4 - 6)  oxyCODONE    IR 10 milliGRAM(s) Oral every 4 hours PRN Severe Pain (7 - 10)  traMADol 50 milliGRAM(s) Oral every 6 hours PRN Mild Pain (1 - 3)    Physical Exam: Constitutional - NAD, Comfortable  HEENT - NCAT, EOMI  Neck - Supple, No limited ROM  Chest - good chest expansion, good respiratory effort, CTAB   Cardio - warm and well perfused  Abdomen -  Soft, NTND  Extremities - No peripheral edema, No calf tenderness. Aquacel dressing in place on the LLE.   Neurologic Exam:                    Cognitive -             Orientation: Awake, Alert, AAO to self, place, date, year, situation            Thought: process, content appropriate     Speech - Fluent, Comprehensible, No dysarthria, No aphasia      Cranial Nerves - No facial asymmetry, Tongue midline, EOMI, Shoulder shrug intact     Motor -                      LEFT    UE - ShAB 5/5, EF 5/5, EE 5/5, WE 5/5,  WNL                    RIGHT UE - ShAB 5/5, EF 5/5, EE 5/5, WE 5/5,  WNL                    LEFT    LE - HF 3/5, KE 3/5, DF 4/5, PF 4/5                    RIGHT LE - HF 5/5, KE 5/5, DF 5/5, PF 5/5        Sensory - Intact to LT bilateral     OculoVestibular -  No nystagmus  Psychiatric - Mood stable, Affect WNL  Skin on admission: Aquacel dressing in place on the LLE. otherwise the skin is warm dry & intact.      Continue comprehensive acute rehab program consisting of 3hrs/day of OT/PT.

## 2023-05-26 NOTE — PROGRESS NOTE ADULT - SUBJECTIVE AND OBJECTIVE BOX
Patient is a 62y old  Female who presents with a chief complaint of Artificial knee joint present     (25 May 2023 10:36)      24 HOUR EVENTS:  No overnight events reported.     SUBJECTIVE:  Patient seen and examined at bedside.     ALLERGIES:  No Known Allergies    MEDICATIONS  (STANDING):  aspirin 325 milliGRAM(s) Oral two times a day  atorvastatin 10 milliGRAM(s) Oral at bedtime  celecoxib 200 milliGRAM(s) Oral two times a day  dextrose 5%. 1000 milliLiter(s) (50 mL/Hr) IV Continuous <Continuous>  dextrose 5%. 1000 milliLiter(s) (100 mL/Hr) IV Continuous <Continuous>  dextrose 50% Injectable 25 Gram(s) IV Push once  dextrose 50% Injectable 12.5 Gram(s) IV Push once  dextrose 50% Injectable 25 Gram(s) IV Push once  folic acid 1 milliGRAM(s) Oral daily  glucagon  Injectable 1 milliGRAM(s) IntraMuscular once  hydrochlorothiazide 12.5 milliGRAM(s) Oral daily  insulin lispro (ADMELOG) corrective regimen sliding scale   SubCutaneous three times a day before meals  insulin lispro (ADMELOG) corrective regimen sliding scale   SubCutaneous at bedtime  losartan 50 milliGRAM(s) Oral daily  multivitamin 1 Tablet(s) Oral daily  pantoprazole    Tablet 40 milliGRAM(s) Oral before breakfast  polyethylene glycol 3350 17 Gram(s) Oral at bedtime  senna 2 Tablet(s) Oral at bedtime    MEDICATIONS  (PRN):  dextrose Oral Gel 15 Gram(s) Oral once PRN Blood Glucose LESS THAN 70 milliGRAM(s)/deciliter  oxyCODONE    IR 5 milliGRAM(s) Oral every 4 hours PRN Moderate Pain (4 - 6)  oxyCODONE    IR 10 milliGRAM(s) Oral every 4 hours PRN Severe Pain (7 - 10)  traMADol 50 milliGRAM(s) Oral every 6 hours PRN Mild Pain (1 - 3)    Vital Signs Last 24 Hrs  T(F): 98.2 (26 May 2023 08:06), Max: 98.6 (25 May 2023 20:13)  HR: 69 (26 May 2023 08:06) (69 - 80)  BP: 111/66 (26 May 2023 08:06) (99/62 - 130/61)  RR: 14 (26 May 2023 08:06) (14 - 15)  SpO2: 98% (26 May 2023 08:06) (94% - 98%)  I&O's Summary    PHYSICAL EXAM:  General: NAD, Awake and alert  ENT: Moist mucous membranes, no thrush  Neck: Supple, No JVD  Lungs: Clear to auscultation bilaterally, good air entry, non-labored breathing  Cardio: RRR, S1/S2, No murmur  Abdomen: Soft, Nontender, Nondistended; Bowel sounds present  Extremities: No calf tenderness, No pitting edema, no contractures.    LABS:                        8.8    8.79  )-----------( 236      ( 25 May 2023 05:35 )             26.5     05-25    138  |  101  |  10  ----------------------------<  107  3.6   |  30  |  0.79    Ca    8.3      25 May 2023 05:35    TPro  5.9  /  Alb  2.3  /  TBili  0.8  /  DBili  x   /  AST  13  /  ALT  15  /  AlkPhos  56  05-25      POCT Blood Glucose.: 175 mg/dL (26 May 2023 07:30)  POCT Blood Glucose.: 120 mg/dL (25 May 2023 22:53)  POCT Blood Glucose.: 138 mg/dL (25 May 2023 17:39)  POCT Blood Glucose.: 114 mg/dL (25 May 2023 12:13)          COVID-19 PCR: NotDetec (05-23-23 @ 12:32)    RADIOLOGY & ADDITIONAL TESTS:    Care Discussed with Consultants/Other Providers:

## 2023-05-27 LAB
GLUCOSE BLDC GLUCOMTR-MCNC: 104 MG/DL — HIGH (ref 70–99)
GLUCOSE BLDC GLUCOMTR-MCNC: 124 MG/DL — HIGH (ref 70–99)
GLUCOSE BLDC GLUCOMTR-MCNC: 141 MG/DL — HIGH (ref 70–99)
GLUCOSE BLDC GLUCOMTR-MCNC: 153 MG/DL — HIGH (ref 70–99)

## 2023-05-27 PROCEDURE — 99232 SBSQ HOSP IP/OBS MODERATE 35: CPT

## 2023-05-27 RX ADMIN — OXYCODONE HYDROCHLORIDE 5 MILLIGRAM(S): 5 TABLET ORAL at 22:30

## 2023-05-27 RX ADMIN — Medication 1 TABLET(S): at 12:07

## 2023-05-27 RX ADMIN — Medication 325 MILLIGRAM(S): at 17:25

## 2023-05-27 RX ADMIN — Medication 975 MILLIGRAM(S): at 17:31

## 2023-05-27 RX ADMIN — TRAMADOL HYDROCHLORIDE 50 MILLIGRAM(S): 50 TABLET ORAL at 16:10

## 2023-05-27 RX ADMIN — ATORVASTATIN CALCIUM 10 MILLIGRAM(S): 80 TABLET, FILM COATED ORAL at 20:36

## 2023-05-27 RX ADMIN — Medication 975 MILLIGRAM(S): at 06:10

## 2023-05-27 RX ADMIN — Medication 1 MILLIGRAM(S): at 12:07

## 2023-05-27 RX ADMIN — CELECOXIB 200 MILLIGRAM(S): 200 CAPSULE ORAL at 17:25

## 2023-05-27 RX ADMIN — Medication 1: at 12:06

## 2023-05-27 RX ADMIN — CELECOXIB 200 MILLIGRAM(S): 200 CAPSULE ORAL at 06:11

## 2023-05-27 RX ADMIN — LOSARTAN POTASSIUM 50 MILLIGRAM(S): 100 TABLET, FILM COATED ORAL at 08:14

## 2023-05-27 RX ADMIN — CELECOXIB 200 MILLIGRAM(S): 200 CAPSULE ORAL at 17:31

## 2023-05-27 RX ADMIN — POLYETHYLENE GLYCOL 3350 17 GRAM(S): 17 POWDER, FOR SOLUTION ORAL at 20:35

## 2023-05-27 RX ADMIN — Medication 975 MILLIGRAM(S): at 17:25

## 2023-05-27 RX ADMIN — PANTOPRAZOLE SODIUM 40 MILLIGRAM(S): 20 TABLET, DELAYED RELEASE ORAL at 06:10

## 2023-05-27 RX ADMIN — TRAMADOL HYDROCHLORIDE 50 MILLIGRAM(S): 50 TABLET ORAL at 17:10

## 2023-05-27 RX ADMIN — OXYCODONE HYDROCHLORIDE 5 MILLIGRAM(S): 5 TABLET ORAL at 23:34

## 2023-05-27 RX ADMIN — SENNA PLUS 2 TABLET(S): 8.6 TABLET ORAL at 20:35

## 2023-05-27 RX ADMIN — Medication 325 MILLIGRAM(S): at 06:10

## 2023-05-27 NOTE — PROGRESS NOTE ADULT - ASSESSMENT
63yo Female with PMH of HTN, HLD, DM and OA, s/p elective left total knee arthroplasty with Dr. Glover on 5/22/2023.     # Left Knee OA s/p Left Total Knee Replacement Arthroplasty  -WBAT  - 325mg of ASA BID for DVT ppx x6 weeks. Take w/ PPI for stomach protection.  - Any suture/staples to be removed on post-op day #14 (6/5/2023).   - PT/OT/pain management, bowel regimen per PMR  - fall precautions    # acute post operative blood loss anemia on top of normocytic anemia  - discussed w/ pt and daughter, pt has known  history of anemia with extensive outpatient work up.  - trend h/h    #HTN  - per daughter, home regimen - losartan-hctz 100-12.5   -continue current in hospital regimen, adjust as appropriate:  - Losartan 50 mg  - HCTZ 12.5mg  - will follow BP    #HLD  - Atorvastatin 10 mg daily    #DM2  -[id number] ordered for AM  -ISS, blood sugars have been controlled  -Take Trulicity at home 1x a week - not approved for hospital use    # Constipation  - miralax  - Additionally added senna and suppository.

## 2023-05-27 NOTE — PROGRESS NOTE ADULT - SUBJECTIVE AND OBJECTIVE BOX
Hospitalist: Cesar Sandra DO    CHIEF COMPLAINT: Patient is a 62y old  female who presents with a chief complaint of Primary Osteoarthritis of Left Knee S/P Total Joint Replacement Arthroplasty (26 May 2023 12:43)      SUBJECTIVE / OVERNIGHT EVENTS: Patient seen and examined. No acute events overnight. Pain well controlled and patient without any complaints.    MEDICATIONS  (STANDING):  acetaminophen     Tablet .. 975 milliGRAM(s) Oral every 12 hours  aspirin 325 milliGRAM(s) Oral two times a day  atorvastatin 10 milliGRAM(s) Oral at bedtime  celecoxib 200 milliGRAM(s) Oral two times a day  dextrose 5%. 1000 milliLiter(s) (50 mL/Hr) IV Continuous <Continuous>  dextrose 5%. 1000 milliLiter(s) (100 mL/Hr) IV Continuous <Continuous>  dextrose 50% Injectable 25 Gram(s) IV Push once  dextrose 50% Injectable 12.5 Gram(s) IV Push once  dextrose 50% Injectable 25 Gram(s) IV Push once  folic acid 1 milliGRAM(s) Oral daily  glucagon  Injectable 1 milliGRAM(s) IntraMuscular once  hydrochlorothiazide 12.5 milliGRAM(s) Oral daily  insulin lispro (ADMELOG) corrective regimen sliding scale   SubCutaneous three times a day before meals  insulin lispro (ADMELOG) corrective regimen sliding scale   SubCutaneous at bedtime  losartan 50 milliGRAM(s) Oral daily  multivitamin 1 Tablet(s) Oral daily  pantoprazole    Tablet 40 milliGRAM(s) Oral before breakfast  polyethylene glycol 3350 17 Gram(s) Oral at bedtime  senna 2 Tablet(s) Oral at bedtime    MEDICATIONS  (PRN):  dextrose Oral Gel 15 Gram(s) Oral once PRN Blood Glucose LESS THAN 70 milliGRAM(s)/deciliter  oxyCODONE    IR 10 milliGRAM(s) Oral every 4 hours PRN Severe Pain (7 - 10)  oxyCODONE    IR 5 milliGRAM(s) Oral every 4 hours PRN Moderate Pain (4 - 6)  traMADol 50 milliGRAM(s) Oral every 6 hours PRN Mild Pain (1 - 3)      VITALS:  T(F): 98.1 (05-27-23 @ 08:15), Max: 98.1 (05-27-23 @ 08:15)  HR: 62 (05-27-23 @ 08:15) (62 - 69)  BP: 119/72 (05-27-23 @ 08:15) (103/51 - 119/72)  RR: 17 (05-27-23 @ 08:15) (14 - 17)  SpO2: 98% (05-27-23 @ 08:15)      REVIEW OF SYSTEMS:  For ROV please refer back to H&P     PHYSICAL EXAM:  General: NAD, Awake and alert  ENT: Moist mucous membranes, no thrush  Neck: Supple, No JVD  Lungs: Clear to auscultation bilaterally, good air entry, non-labored breathing  Cardio: RRR, S1/S2, No murmur  Abdomen: Soft, Nontender, Nondistended; Bowel sounds present  Extremities: No calf tenderness, No pitting edema, no contractures.       CAPILLARY BLOOD GLUCOSE  POCT Blood Glucose.: 153 mg/dL (27 May 2023 12:03)  POCT Blood Glucose.: 124 mg/dL (27 May 2023 08:03)  POCT Blood Glucose.: 137 mg/dL (26 May 2023 21:40)  POCT Blood Glucose.: 121 mg/dL (26 May 2023 16:53)      RADIOLOGY & ADDITIONAL TESTS:    Imaging Personally Reviewed:    [X] Consultant(s) Notes Reviewed:  [X] Care Discussed with Consultants/Other Providers:

## 2023-05-27 NOTE — PROGRESS NOTE ADULT - SUBJECTIVE AND OBJECTIVE BOX
Cc: Gait dysfunction    HPI: Patient seen and examined at bedside. No acute events overnight.   Pain controlled, no chest pain, no N/V, no Fevers/Chills. No other new ROS  Has been tolerating rehabilitation program.    acetaminophen     Tablet .. 975 milliGRAM(s) Oral every 12 hours  aspirin 325 milliGRAM(s) Oral two times a day  atorvastatin 10 milliGRAM(s) Oral at bedtime  celecoxib 200 milliGRAM(s) Oral two times a day  dextrose 5%. 1000 milliLiter(s) IV Continuous <Continuous>  dextrose 5%. 1000 milliLiter(s) IV Continuous <Continuous>  dextrose 50% Injectable 25 Gram(s) IV Push once  dextrose 50% Injectable 12.5 Gram(s) IV Push once  dextrose 50% Injectable 25 Gram(s) IV Push once  dextrose Oral Gel 15 Gram(s) Oral once PRN  folic acid 1 milliGRAM(s) Oral daily  glucagon  Injectable 1 milliGRAM(s) IntraMuscular once  hydrochlorothiazide 12.5 milliGRAM(s) Oral daily  insulin lispro (ADMELOG) corrective regimen sliding scale   SubCutaneous three times a day before meals  insulin lispro (ADMELOG) corrective regimen sliding scale   SubCutaneous at bedtime  losartan 50 milliGRAM(s) Oral daily  multivitamin 1 Tablet(s) Oral daily  oxyCODONE    IR 10 milliGRAM(s) Oral every 4 hours PRN  oxyCODONE    IR 5 milliGRAM(s) Oral every 4 hours PRN  pantoprazole    Tablet 40 milliGRAM(s) Oral before breakfast  polyethylene glycol 3350 17 Gram(s) Oral at bedtime  senna 2 Tablet(s) Oral at bedtime  traMADol 50 milliGRAM(s) Oral every 6 hours PRN      T(C): 36.7 (05-27-23 @ 08:15), Max: 36.7 (05-26-23 @ 21:35)  HR: 62 (05-27-23 @ 08:15) (62 - 69)  BP: 119/72 (05-27-23 @ 08:15) (103/51 - 119/72)  RR: 17 (05-27-23 @ 08:15) (14 - 17)  SpO2: 98% (05-27-23 @ 08:15) (95% - 98%)    In NAD  HEENT- EOMI  Heart- S1S2  Lungs- CTA bl.  Abd- + BS, NT  Ext- No calf pain  Neuro- Exam unchanged    CBC 5/25 reviewed - Hb 8.8  CMP 5/25 reviewed  Anemia workup 5/26 reviewed    Imp: Patient with diagnosis of L TKA admitted for comprehensive acute rehabilitation.    Plan:  - Continue PT/OT/SLP as indicated  - DVT prophylaxis - Continue ASA 325mg BID  - Skin- Turn q2h, check skin daily  - Continue current medications  -Active issues-   Pain - Continue Tylenol, Oxy PRN  Anemia - monitor CBC  - Patient is stable to continue current rehabilitation program.

## 2023-05-28 LAB
GLUCOSE BLDC GLUCOMTR-MCNC: 113 MG/DL — HIGH (ref 70–99)
GLUCOSE BLDC GLUCOMTR-MCNC: 133 MG/DL — HIGH (ref 70–99)
GLUCOSE BLDC GLUCOMTR-MCNC: 146 MG/DL — HIGH (ref 70–99)
GLUCOSE BLDC GLUCOMTR-MCNC: 153 MG/DL — HIGH (ref 70–99)

## 2023-05-28 PROCEDURE — 99232 SBSQ HOSP IP/OBS MODERATE 35: CPT

## 2023-05-28 RX ADMIN — ATORVASTATIN CALCIUM 10 MILLIGRAM(S): 80 TABLET, FILM COATED ORAL at 22:32

## 2023-05-28 RX ADMIN — CELECOXIB 200 MILLIGRAM(S): 200 CAPSULE ORAL at 06:10

## 2023-05-28 RX ADMIN — CELECOXIB 200 MILLIGRAM(S): 200 CAPSULE ORAL at 17:21

## 2023-05-28 RX ADMIN — SENNA PLUS 2 TABLET(S): 8.6 TABLET ORAL at 22:32

## 2023-05-28 RX ADMIN — Medication 325 MILLIGRAM(S): at 06:10

## 2023-05-28 RX ADMIN — OXYCODONE HYDROCHLORIDE 10 MILLIGRAM(S): 5 TABLET ORAL at 23:33

## 2023-05-28 RX ADMIN — Medication 1 TABLET(S): at 12:06

## 2023-05-28 RX ADMIN — PANTOPRAZOLE SODIUM 40 MILLIGRAM(S): 20 TABLET, DELAYED RELEASE ORAL at 06:09

## 2023-05-28 RX ADMIN — Medication 1 MILLIGRAM(S): at 12:06

## 2023-05-28 RX ADMIN — TRAMADOL HYDROCHLORIDE 50 MILLIGRAM(S): 50 TABLET ORAL at 06:09

## 2023-05-28 RX ADMIN — LOSARTAN POTASSIUM 50 MILLIGRAM(S): 100 TABLET, FILM COATED ORAL at 06:09

## 2023-05-28 RX ADMIN — OXYCODONE HYDROCHLORIDE 10 MILLIGRAM(S): 5 TABLET ORAL at 22:31

## 2023-05-28 RX ADMIN — Medication 325 MILLIGRAM(S): at 17:21

## 2023-05-28 RX ADMIN — CELECOXIB 200 MILLIGRAM(S): 200 CAPSULE ORAL at 17:53

## 2023-05-28 RX ADMIN — POLYETHYLENE GLYCOL 3350 17 GRAM(S): 17 POWDER, FOR SOLUTION ORAL at 22:32

## 2023-05-28 NOTE — PROGRESS NOTE ADULT - SUBJECTIVE AND OBJECTIVE BOX
Cc: Gait dysfunction    HPI: Patient seen and examined at bedside. No acute events overnight.   Pain controlled, no chest pain, no N/V, no Fevers/Chills. No other new ROS  Has been tolerating rehabilitation program.    acetaminophen     Tablet .. 975 milliGRAM(s) Oral every 12 hours  aspirin 325 milliGRAM(s) Oral two times a day  atorvastatin 10 milliGRAM(s) Oral at bedtime  celecoxib 200 milliGRAM(s) Oral two times a day  dextrose 5%. 1000 milliLiter(s) IV Continuous <Continuous>  dextrose 5%. 1000 milliLiter(s) IV Continuous <Continuous>  dextrose 50% Injectable 25 Gram(s) IV Push once  dextrose 50% Injectable 12.5 Gram(s) IV Push once  dextrose 50% Injectable 25 Gram(s) IV Push once  dextrose Oral Gel 15 Gram(s) Oral once PRN  folic acid 1 milliGRAM(s) Oral daily  glucagon  Injectable 1 milliGRAM(s) IntraMuscular once  hydrochlorothiazide 12.5 milliGRAM(s) Oral daily  insulin lispro (ADMELOG) corrective regimen sliding scale   SubCutaneous three times a day before meals  insulin lispro (ADMELOG) corrective regimen sliding scale   SubCutaneous at bedtime  losartan 50 milliGRAM(s) Oral daily  multivitamin 1 Tablet(s) Oral daily  oxyCODONE    IR 10 milliGRAM(s) Oral every 4 hours PRN  oxyCODONE    IR 5 milliGRAM(s) Oral every 4 hours PRN  pantoprazole    Tablet 40 milliGRAM(s) Oral before breakfast  polyethylene glycol 3350 17 Gram(s) Oral at bedtime  senna 2 Tablet(s) Oral at bedtime  traMADol 50 milliGRAM(s) Oral every 6 hours PRN      T(C): 36.6 (05-28-23 @ 08:08), Max: 36.9 (05-27-23 @ 20:31)  HR: 63 (05-28-23 @ 08:08) (63 - 71)  BP: 130/77 (05-28-23 @ 08:08) (115/80 - 130/77)  RR: 16 (05-28-23 @ 08:08) (16 - 17)  SpO2: 97% (05-28-23 @ 08:08) (95% - 97%)    X-ray L Knee 5/22/23 reviewed by me: s/p TKA, intact hardware seen        ACC: [id number] EXAM: XR KNEE 1-2 VIEWS LT ORDERED BY: NIDHI ELIZABETH    PROCEDURE DATE: 05/22/2023        INTERPRETATION: CLINICAL INDICATION: baseline postoperative evaluation sets post left total knee replacement for osteoarthritis    EXAM:  Frontal and crosstable lateral left knee from 5/22/2023 at 0745.    IMPRESSION:  Unconstrained left total knee prosthesis implanted.    Intact and aligned hardware and no periprosthetic fractures.    Postoperative soft tissue changes.    Correlate with intraoperative findings.    --- End of Report ---    HAZEL BAIN MD; Attending Radiologist  This document has been electronically signed. May 22 2023 3:00PM      In NAD  HEENT- EOMI  Heart- S1S2  Lungs- CTA bl.  Abd- + BS, NT  Ext- No calf pain  Neuro- Exam unchanged    Imp: Patient with diagnosis of L TKA admitted for comprehensive acute rehabilitation.    Plan:  - Continue PT/OT/SLP as indicated  - DVT prophylaxis - Continue ASA 325mg BID  - Skin- Turn q2h, check skin daily  - Continue current medications  -Active issues-   Pain - Continue Tylenol, Oxy PRN  Anemia - monitor CBC  - Patient is stable to continue current rehabilitation program.

## 2023-05-29 LAB
ALBUMIN SERPL ELPH-MCNC: 2.3 G/DL — LOW (ref 3.3–5)
ALP SERPL-CCNC: 64 U/L — SIGNIFICANT CHANGE UP (ref 40–120)
ALT FLD-CCNC: 22 U/L — SIGNIFICANT CHANGE UP (ref 10–45)
ANION GAP SERPL CALC-SCNC: 7 MMOL/L — SIGNIFICANT CHANGE UP (ref 5–17)
AST SERPL-CCNC: 16 U/L — SIGNIFICANT CHANGE UP (ref 10–40)
BILIRUB SERPL-MCNC: 0.4 MG/DL — SIGNIFICANT CHANGE UP (ref 0.2–1.2)
BUN SERPL-MCNC: 18 MG/DL — SIGNIFICANT CHANGE UP (ref 7–23)
CALCIUM SERPL-MCNC: 8.8 MG/DL — SIGNIFICANT CHANGE UP (ref 8.4–10.5)
CHLORIDE SERPL-SCNC: 102 MMOL/L — SIGNIFICANT CHANGE UP (ref 96–108)
CO2 SERPL-SCNC: 29 MMOL/L — SIGNIFICANT CHANGE UP (ref 22–31)
CREAT SERPL-MCNC: 0.91 MG/DL — SIGNIFICANT CHANGE UP (ref 0.5–1.3)
EGFR: 71 ML/MIN/1.73M2 — SIGNIFICANT CHANGE UP
GLUCOSE BLDC GLUCOMTR-MCNC: 105 MG/DL — HIGH (ref 70–99)
GLUCOSE SERPL-MCNC: 107 MG/DL — HIGH (ref 70–99)
HCT VFR BLD CALC: 25.6 % — LOW (ref 34.5–45)
HGB BLD-MCNC: 8.4 G/DL — LOW (ref 11.5–15.5)
MCHC RBC-ENTMCNC: 29.6 PG — SIGNIFICANT CHANGE UP (ref 27–34)
MCHC RBC-ENTMCNC: 32.8 GM/DL — SIGNIFICANT CHANGE UP (ref 32–36)
MCV RBC AUTO: 90.1 FL — SIGNIFICANT CHANGE UP (ref 80–100)
NRBC # BLD: 0 /100 WBCS — SIGNIFICANT CHANGE UP (ref 0–0)
PLATELET # BLD AUTO: 310 K/UL — SIGNIFICANT CHANGE UP (ref 150–400)
POTASSIUM SERPL-MCNC: 3.9 MMOL/L — SIGNIFICANT CHANGE UP (ref 3.5–5.3)
POTASSIUM SERPL-SCNC: 3.9 MMOL/L — SIGNIFICANT CHANGE UP (ref 3.5–5.3)
PROT SERPL-MCNC: 6.3 G/DL — SIGNIFICANT CHANGE UP (ref 6–8.3)
RBC # BLD: 2.84 M/UL — LOW (ref 3.8–5.2)
RBC # FLD: 12.6 % — SIGNIFICANT CHANGE UP (ref 10.3–14.5)
SODIUM SERPL-SCNC: 138 MMOL/L — SIGNIFICANT CHANGE UP (ref 135–145)
WBC # BLD: 7.25 K/UL — SIGNIFICANT CHANGE UP (ref 3.8–10.5)
WBC # FLD AUTO: 7.25 K/UL — SIGNIFICANT CHANGE UP (ref 3.8–10.5)

## 2023-05-29 PROCEDURE — 99232 SBSQ HOSP IP/OBS MODERATE 35: CPT

## 2023-05-29 RX ADMIN — POLYETHYLENE GLYCOL 3350 17 GRAM(S): 17 POWDER, FOR SOLUTION ORAL at 21:22

## 2023-05-29 RX ADMIN — Medication 325 MILLIGRAM(S): at 17:21

## 2023-05-29 RX ADMIN — LOSARTAN POTASSIUM 50 MILLIGRAM(S): 100 TABLET, FILM COATED ORAL at 08:08

## 2023-05-29 RX ADMIN — OXYCODONE HYDROCHLORIDE 5 MILLIGRAM(S): 5 TABLET ORAL at 10:11

## 2023-05-29 RX ADMIN — OXYCODONE HYDROCHLORIDE 5 MILLIGRAM(S): 5 TABLET ORAL at 11:14

## 2023-05-29 RX ADMIN — CELECOXIB 200 MILLIGRAM(S): 200 CAPSULE ORAL at 18:49

## 2023-05-29 RX ADMIN — CELECOXIB 200 MILLIGRAM(S): 200 CAPSULE ORAL at 06:50

## 2023-05-29 RX ADMIN — OXYCODONE HYDROCHLORIDE 10 MILLIGRAM(S): 5 TABLET ORAL at 23:44

## 2023-05-29 RX ADMIN — Medication 975 MILLIGRAM(S): at 06:07

## 2023-05-29 RX ADMIN — Medication 1 TABLET(S): at 12:02

## 2023-05-29 RX ADMIN — PANTOPRAZOLE SODIUM 40 MILLIGRAM(S): 20 TABLET, DELAYED RELEASE ORAL at 06:07

## 2023-05-29 RX ADMIN — CELECOXIB 200 MILLIGRAM(S): 200 CAPSULE ORAL at 06:07

## 2023-05-29 RX ADMIN — ATORVASTATIN CALCIUM 10 MILLIGRAM(S): 80 TABLET, FILM COATED ORAL at 21:21

## 2023-05-29 RX ADMIN — CELECOXIB 200 MILLIGRAM(S): 200 CAPSULE ORAL at 17:20

## 2023-05-29 RX ADMIN — Medication 975 MILLIGRAM(S): at 06:50

## 2023-05-29 RX ADMIN — Medication 325 MILLIGRAM(S): at 06:07

## 2023-05-29 RX ADMIN — SENNA PLUS 2 TABLET(S): 8.6 TABLET ORAL at 21:22

## 2023-05-29 RX ADMIN — Medication 1 MILLIGRAM(S): at 12:02

## 2023-05-29 RX ADMIN — OXYCODONE HYDROCHLORIDE 10 MILLIGRAM(S): 5 TABLET ORAL at 21:22

## 2023-05-29 NOTE — PROGRESS NOTE ADULT - ASSESSMENT
61yo Female with PMH of HTN, HLD, DM and OA, s/p elective left total knee arthroplasty with Dr. Glover on 5/22/2023.     # Left Knee OA s/p Left Total Knee Replacement Arthroplasty  -WBAT  - 325mg of ASA BID for DVT ppx x6 weeks. Take w/ PPI for stomach protection.  - Any suture/staples to be removed on post-op day #14 (6/5/2023).     # acute post operative blood loss anemia on top of normocytic anemia  - discussed w/ pt and daughter, pt has known  history of anemia with extensive outpatient work up.  - trend h/h    #HTN  - per daughter, home regimen - losartan-hctz 100-12.5   -continue current in hospital regimen, adjust as appropriate:  - Losartan 50 mg  - HCTZ 12.5mg  - will follow BP    #HLD  - Atorvastatin 10 mg daily    #DM2  -A1c noted to be 5.6  -Blood sugars have been controlled  -Will d/c ISS and d/c FS at this time  -Take Trulicity at home 1x a week - not approved for hospital use    # Constipation  - miralax  - Additionally added senna and suppository.    DVT PPX: ASA BID

## 2023-05-29 NOTE — PROGRESS NOTE ADULT - SUBJECTIVE AND OBJECTIVE BOX
Patient is a 62y old  Female who presents with a chief complaint of Primary Osteoarthritis of Left Knee S/P Total Joint Replacement Arthroplasty (28 May 2023 16:38)    Patient upset about her food; feels she is not getting what she wants  Denies chest pain, SOB  Tolerating diet  BM yesterday  Patient seen and examined at bedside.    ALLERGIES:  No Known Allergies    MEDICATIONS  (STANDING):  acetaminophen     Tablet .. 975 milliGRAM(s) Oral every 12 hours  aspirin 325 milliGRAM(s) Oral two times a day  atorvastatin 10 milliGRAM(s) Oral at bedtime  celecoxib 200 milliGRAM(s) Oral two times a day  dextrose 5%. 1000 milliLiter(s) (50 mL/Hr) IV Continuous <Continuous>  dextrose 5%. 1000 milliLiter(s) (100 mL/Hr) IV Continuous <Continuous>  dextrose 50% Injectable 25 Gram(s) IV Push once  dextrose 50% Injectable 12.5 Gram(s) IV Push once  dextrose 50% Injectable 25 Gram(s) IV Push once  folic acid 1 milliGRAM(s) Oral daily  glucagon  Injectable 1 milliGRAM(s) IntraMuscular once  hydrochlorothiazide 12.5 milliGRAM(s) Oral daily  insulin lispro (ADMELOG) corrective regimen sliding scale   SubCutaneous three times a day before meals  insulin lispro (ADMELOG) corrective regimen sliding scale   SubCutaneous at bedtime  losartan 50 milliGRAM(s) Oral daily  multivitamin 1 Tablet(s) Oral daily  pantoprazole    Tablet 40 milliGRAM(s) Oral before breakfast  polyethylene glycol 3350 17 Gram(s) Oral at bedtime  senna 2 Tablet(s) Oral at bedtime    MEDICATIONS  (PRN):  dextrose Oral Gel 15 Gram(s) Oral once PRN Blood Glucose LESS THAN 70 milliGRAM(s)/deciliter  oxyCODONE    IR 10 milliGRAM(s) Oral every 4 hours PRN Severe Pain (7 - 10)  oxyCODONE    IR 5 milliGRAM(s) Oral every 4 hours PRN Moderate Pain (4 - 6)  traMADol 50 milliGRAM(s) Oral every 6 hours PRN Mild Pain (1 - 3)    Vital Signs Last 24 Hrs  T(F): 97.8 (29 May 2023 08:02), Max: 98 (28 May 2023 22:16)  HR: 61 (29 May 2023 08:02) (61 - 69)  BP: 146/82 (29 May 2023 08:02) (109/65 - 146/82)  RR: 17 (29 May 2023 08:02) (17 - 18)  SpO2: 99% (29 May 2023 08:02) (96% - 99%)  I&O's Summary    BMI (kg/m2): 38.5 (05-28-23 @ 06:38)  PHYSICAL EXAM:  General: NAD, A/O x 3  ENT: MMM, no scleral icterus  Neck: Supple, No JVD, no thyroidomegaly  Lungs: Clear to auscultation bilaterally, no wheezes, no rales, no rhonchi, good inspiratory effort  Cardio: RRR, S1/S2, No murmurs  Abdomen: Soft, Nontender, Nondistended; Bowel sounds present  Extremities: No calf tenderness, No pitting edema, no skin changes    LABS:                        8.4    7.25  )-----------( 310      ( 29 May 2023 07:05 )             25.6       05-29    138  |  102  |  18  ----------------------------<  107  3.9   |  29  |  0.91    Ca    8.8      29 May 2023 07:05    TPro  6.3  /  Alb  2.3  /  TBili  0.4  /  DBili  x   /  AST  16  /  ALT  22  /  AlkPhos  64  05-29     POCT Blood Glucose.: 105 mg/dL (29 May 2023 08:01)  POCT Blood Glucose.: 153 mg/dL (28 May 2023 22:29)  POCT Blood Glucose.: 146 mg/dL (28 May 2023 16:34)  POCT Blood Glucose.: 133 mg/dL (28 May 2023 11:59)    COVID-19 PCR: NotDetec (05-23-23 @ 12:32)  COVID-19 PCR: NotDetec (05-23-23 @ 12:32)

## 2023-05-29 NOTE — PROGRESS NOTE ADULT - SUBJECTIVE AND OBJECTIVE BOX
Cc: Gait dysfunction    HPI: Patient seen and examined at bedside. No acute events overnight.   Pain controlled, no chest pain, no N/V, no Fevers/Chills. No other new ROS  Has been tolerating rehabilitation program.    acetaminophen     Tablet .. 975 milliGRAM(s) Oral every 12 hours  aspirin 325 milliGRAM(s) Oral two times a day  atorvastatin 10 milliGRAM(s) Oral at bedtime  celecoxib 200 milliGRAM(s) Oral two times a day  dextrose 5%. 1000 milliLiter(s) IV Continuous <Continuous>  dextrose 5%. 1000 milliLiter(s) IV Continuous <Continuous>  dextrose 50% Injectable 25 Gram(s) IV Push once  dextrose 50% Injectable 12.5 Gram(s) IV Push once  dextrose 50% Injectable 25 Gram(s) IV Push once  dextrose Oral Gel 15 Gram(s) Oral once PRN  folic acid 1 milliGRAM(s) Oral daily  glucagon  Injectable 1 milliGRAM(s) IntraMuscular once  hydrochlorothiazide 12.5 milliGRAM(s) Oral daily  losartan 50 milliGRAM(s) Oral daily  multivitamin 1 Tablet(s) Oral daily  oxyCODONE    IR 10 milliGRAM(s) Oral every 4 hours PRN  oxyCODONE    IR 5 milliGRAM(s) Oral every 4 hours PRN  pantoprazole    Tablet 40 milliGRAM(s) Oral before breakfast  polyethylene glycol 3350 17 Gram(s) Oral at bedtime  senna 2 Tablet(s) Oral at bedtime  traMADol 50 milliGRAM(s) Oral every 6 hours PRN      T(C): 36.6 (05-29-23 @ 08:02), Max: 36.7 (05-28-23 @ 22:16)  HR: 61 (05-29-23 @ 08:02) (61 - 69)  BP: 146/82 (05-29-23 @ 08:02) (109/65 - 146/82)  RR: 17 (05-29-23 @ 08:02) (17 - 18)  SpO2: 99% (05-29-23 @ 08:02) (96% - 99%)    CBC 5/29 reviewed  CMP 5/29 reviewed   POCT BG 5/28-29 reviewed 146->153->105    In NAD  HEENT- EOMI  Heart- S1S2  Lungs- CTA bl.  Abd- + BS, NT  Ext- No calf pain, dressing in place, no drainage seen  Neuro- Exam unchanged    Imp: Patient with diagnosis of L TKA admitted for comprehensive acute rehabilitation.    Plan:  - Continue PT/OT/SLP as indicated  - DVT prophylaxis - Continue ASA 325mg BID  - Skin- Turn q2h, check skin daily  - Continue current medications  -Active issues-   Pain - Continue Tylenol, Oxy PRN  Anemia - monitor CBC, stable  - Patient is stable to continue current rehabilitation program.

## 2023-05-30 PROCEDURE — 99232 SBSQ HOSP IP/OBS MODERATE 35: CPT

## 2023-05-30 RX ORDER — OXYCODONE HYDROCHLORIDE 5 MG/1
5 TABLET ORAL DAILY
Refills: 0 | Status: DISCONTINUED | OUTPATIENT
Start: 2023-05-30 | End: 2023-06-01

## 2023-05-30 RX ORDER — TRAMADOL HYDROCHLORIDE 50 MG/1
50 TABLET ORAL EVERY 6 HOURS
Refills: 0 | Status: DISCONTINUED | OUTPATIENT
Start: 2023-05-30 | End: 2023-06-01

## 2023-05-30 RX ADMIN — CELECOXIB 200 MILLIGRAM(S): 200 CAPSULE ORAL at 18:17

## 2023-05-30 RX ADMIN — Medication 975 MILLIGRAM(S): at 06:11

## 2023-05-30 RX ADMIN — ATORVASTATIN CALCIUM 10 MILLIGRAM(S): 80 TABLET, FILM COATED ORAL at 21:27

## 2023-05-30 RX ADMIN — PANTOPRAZOLE SODIUM 40 MILLIGRAM(S): 20 TABLET, DELAYED RELEASE ORAL at 06:11

## 2023-05-30 RX ADMIN — Medication 975 MILLIGRAM(S): at 22:00

## 2023-05-30 RX ADMIN — OXYCODONE HYDROCHLORIDE 5 MILLIGRAM(S): 5 TABLET ORAL at 22:02

## 2023-05-30 RX ADMIN — OXYCODONE HYDROCHLORIDE 5 MILLIGRAM(S): 5 TABLET ORAL at 09:29

## 2023-05-30 RX ADMIN — OXYCODONE HYDROCHLORIDE 5 MILLIGRAM(S): 5 TABLET ORAL at 08:24

## 2023-05-30 RX ADMIN — CELECOXIB 200 MILLIGRAM(S): 200 CAPSULE ORAL at 06:37

## 2023-05-30 RX ADMIN — SENNA PLUS 2 TABLET(S): 8.6 TABLET ORAL at 21:27

## 2023-05-30 RX ADMIN — OXYCODONE HYDROCHLORIDE 5 MILLIGRAM(S): 5 TABLET ORAL at 21:27

## 2023-05-30 RX ADMIN — LOSARTAN POTASSIUM 50 MILLIGRAM(S): 100 TABLET, FILM COATED ORAL at 06:12

## 2023-05-30 RX ADMIN — Medication 325 MILLIGRAM(S): at 06:11

## 2023-05-30 RX ADMIN — Medication 325 MILLIGRAM(S): at 17:18

## 2023-05-30 RX ADMIN — POLYETHYLENE GLYCOL 3350 17 GRAM(S): 17 POWDER, FOR SOLUTION ORAL at 21:28

## 2023-05-30 RX ADMIN — CELECOXIB 200 MILLIGRAM(S): 200 CAPSULE ORAL at 06:11

## 2023-05-30 RX ADMIN — Medication 1 MILLIGRAM(S): at 11:04

## 2023-05-30 RX ADMIN — Medication 975 MILLIGRAM(S): at 21:26

## 2023-05-30 RX ADMIN — Medication 1 TABLET(S): at 11:04

## 2023-05-30 RX ADMIN — Medication 975 MILLIGRAM(S): at 06:36

## 2023-05-30 RX ADMIN — CELECOXIB 200 MILLIGRAM(S): 200 CAPSULE ORAL at 17:17

## 2023-05-30 NOTE — PROGRESS NOTE ADULT - ATTENDING COMMENTS
I independently performed the documented the history, exam, and medical decision making. I have made amendments to the documentation where necessary. I have personally seen and examined the patient. Medical records were reviewed and I have made amendments to the documentation where necessary and adjusted the history, physical examination, and plan as documented by the Resident Physician.

## 2023-05-30 NOTE — PROGRESS NOTE ADULT - ASSESSMENT
This is a 61yo Female with PMH of HTN, HLD, DM and OA who presents to [facility] in 5/22 for orthopedic surgery. Patient s/p left total knee arthroplasty with Dr. Glover on 5/22/2023. Patient tolerated the procedure well without any intraoperative complications. Patient tolerated physical therapy well, and the pain was controlled. Patient is weight bearing as tolerated with cane/walker as needed. Seen by medical attending for continuity of care and management and cleared for safe discharge. Any suture/staples to be removed on post-op day #14. Patient is on 325mg of ASA for DVT prophylaxis for total of 6 weeks.    # Primary Osteoarthritis of Left Knee S/P Total Joint Replacement Arthroplasty  - ADL and mobility impairment  - Need for assistance with personal care  - Patient is weight bearing as tolerated with cane/walker as needed.   - 325mg of ASA BID for DVT prophylaxis, for 6 weeks.   - begin comprehensive rehab program OT, PT, SLP  3 hours daily 5 x week  - Precautions: fall.     #Obesity    #HTN  - Losartan 50 mg  - HCTZ 12.5mg  - monitor vitals, hospitalist in    #DM   -ISS   -Monitor glucose   -Take Trulicity at home 1x a week. Daughter states she can bring in it.     #HLD  - Atorvastatin 10 mg daily    #Pain management  - Celebrex 200 BID.   - Oxycodone 5mg & 10mg.   - Tizanidne 2mg PRN    GI/Bowel:  - At risk for constipation due to neurologic diagnosis, immobility and/or medication use  - Senna QHS, Miralax Daily  - GI ppx: Protonix    /Bladder:   - At risk for incontinence and retention due to neurologic diagnosis and limited mobility  - Continue catheter/bladder nursing protocol with bladder scans per routine with straight cath for >400cc.  - Encourage timed voids every 4 hours while awake for independence and to promote continence during therapy.      Skin/Pressure Injury:   - At risk for pressure injury due to neurologic diagnosis and relative immobility.  - Skin assessment on admission: Aquacel dressing in place on the LLE  - Turn every 2 hours while in bed, air mattress  - Soft heel protectors  - Skin barrier cream as needed  - Nursing to monitor skin Qshift    #FEN   - Diet: Regular     #DVT ppx  -  BID      Outpatient Follow-up (Specialty/Name of physician):    Jose Antonio Glover)  Orthopedics  [address] [place] [address]  Phone: [phone]  Fax: [phone]  Follow Up Time: 2 weeks     This is a 61yo Female with PMH of HTN, HLD, DM and OA who presents to [facility] in 5/22 for orthopedic surgery. Patient s/p left total knee arthroplasty with Dr. Glover on 5/22/2023. Patient tolerated the procedure well without any intraoperative complications. Patient tolerated physical therapy well, and the pain was controlled. Patient is weight bearing as tolerated with cane/walker as needed. Seen by medical attending for continuity of care and management and cleared for safe discharge. Any suture/staples to be removed on post-op day #14. Patient is on 325mg of ASA for DVT prophylaxis for total of 6 weeks.    # Primary Osteoarthritis of Left Knee S/P Total Joint Replacement Arthroplasty  - ADL and mobility impairment  - Need for assistance with personal care  - Patient is weight bearing as tolerated with cane/walker as needed.   - 325mg of ASA BID for DVT prophylaxis, for 6 weeks.   - begin comprehensive rehab program OT, PT, SLP  3 hours daily 5 x week  - Precautions: fall.     #Obesity    #HTN  - Losartan 50 mg  - HCTZ 12.5mg  - monitor vitals, hospitalist in    #DM   -ISS   -Monitor glucose   -Take Trulicity at home 1x a week. Daughter states she can bring in it.     #HLD  - Atorvastatin 10 mg daily    #Pain management  - Celebrex 200 BID.   - Oxycodone 5mg daily PRN  - Tizanidne 2mg PRN    GI/Bowel:  - At risk for constipation due to neurologic diagnosis, immobility and/or medication use  - Senna QHS, Miralax Daily  - GI ppx: Protonix    /Bladder:   - At risk for incontinence and retention due to neurologic diagnosis and limited mobility  - Continue catheter/bladder nursing protocol with bladder scans per routine with straight cath for >400cc.  - Encourage timed voids every 4 hours while awake for independence and to promote continence during therapy.      Skin/Pressure Injury:   - At risk for pressure injury due to neurologic diagnosis and relative immobility.  - Skin assessment on admission: Aquacel dressing in place on the LLE  - Turn every 2 hours while in bed, air mattress  - Soft heel protectors  - Skin barrier cream as needed  - Nursing to monitor skin Qshift    #FEN   - Diet: Regular     #DVT ppx  -  BID      Outpatient Follow-up (Specialty/Name of physician):    Jose Antonio Glover)  Orthopedics  [address] [facility], [address] [place] [address]  Phone: [phone]  Fax: [phone]  Follow Up Time: 2 weeks

## 2023-05-30 NOTE — PROGRESS NOTE ADULT - SUBJECTIVE AND OBJECTIVE BOX
Patient is a 62y old  Female who presents with a chief complaint of Primary Osteoarthritis of Left Knee S/P Total Joint Replacement Arthroplasty (29 May 2023 15:02)      HPI:  This is a 61yo Female with PMH of HTN, HLD, DM and OA who presents to [facility] in 5/22 for orthopedic surgery. Patient s/p left total knee arthroplasty with Dr. Glover on 5/22/2023. Patient tolerated the procedure well without any intraoperative complications. Patient tolerated physical therapy well, and the pain was controlled. Patient is weight bearing as tolerated with cane/walker as needed. Seen by medical attending for continuity of care and management and cleared for safe discharge. Any suture/staples to be removed on post-op day #14. Patient is on 325mg of ASA for DVT prophylaxis for total of 6 weeks.    Patient was evaluated by PM&R and therapy for functional deficits, gait/ADL impairments and acute rehabilitation was recommended. Patient was medically optimized for discharge to [facility] IRU on 5/24/23.        (24 May 2023 16:14)    _____________________________________________________________  SUBJECTIVE/ROS  Patient was seen and evaluated at bedside today.  She was interviewed with the assistance of her daughter Caridad over the phone this morning.   Reported no overnight events and is in no acute distress.  Patient reports that her pain is well-controlled - she has only been taking oxycodone once per day.  Her last BM was yesterday; she reports mild constipation as she has not been eating well (she dislikes hospital food). Denies straining.  Motivated to participate on the recommended rehabilitation program.  Denies any CP, SOB, WALLACE, palpitations, fever, chills, body aches, cough, congestion, or any other symptoms at this time.       _______________________________________________________________  LABS:                        8.4    7.25  )-----------( 310      ( 29 May 2023 07:05 )             25.6     05-29    138  |  102  |  18  ----------------------------<  107<H>  3.9   |  29  |  0.91    Ca    8.8      29 May 2023 07:05    TPro  6.3  /  Alb  2.3<L>  /  TBili  0.4  /  DBili  x   /  AST  16  /  ALT  22  /  AlkPhos  64  05-29      _______________________________________________________________  MEDICATIONS  (STANDING):  acetaminophen     Tablet .. 975 milliGRAM(s) Oral every 12 hours  aspirin 325 milliGRAM(s) Oral two times a day  atorvastatin 10 milliGRAM(s) Oral at bedtime  celecoxib 200 milliGRAM(s) Oral two times a day  dextrose 5%. 1000 milliLiter(s) (50 mL/Hr) IV Continuous <Continuous>  dextrose 5%. 1000 milliLiter(s) (100 mL/Hr) IV Continuous <Continuous>  dextrose 50% Injectable 25 Gram(s) IV Push once  dextrose 50% Injectable 25 Gram(s) IV Push once  dextrose 50% Injectable 12.5 Gram(s) IV Push once  folic acid 1 milliGRAM(s) Oral daily  glucagon  Injectable 1 milliGRAM(s) IntraMuscular once  hydrochlorothiazide 12.5 milliGRAM(s) Oral daily  losartan 50 milliGRAM(s) Oral daily  multivitamin 1 Tablet(s) Oral daily  pantoprazole    Tablet 40 milliGRAM(s) Oral before breakfast  polyethylene glycol 3350 17 Gram(s) Oral at bedtime  senna 2 Tablet(s) Oral at bedtime    MEDICATIONS  (PRN):  dextrose Oral Gel 15 Gram(s) Oral once PRN Blood Glucose LESS THAN 70 milliGRAM(s)/deciliter  oxyCODONE    IR 10 milliGRAM(s) Oral every 4 hours PRN Severe Pain (7 - 10)  oxyCODONE    IR 5 milliGRAM(s) Oral every 4 hours PRN Moderate Pain (4 - 6)  traMADol 50 milliGRAM(s) Oral every 6 hours PRN Mild Pain (1 - 3)    _________________________________________________________________    Physical Exam: Constitutional - NAD, Comfortable  HEENT - NCAT, EOMI  Neck - Supple, No limited ROM  Chest - good chest expansion, good respiratory effort, CTAB   Cardio - warm and well perfused  Abdomen -  Soft, NTND  Extremities - No peripheral edema, No calf tenderness. Aquacel dressing in place on the LLE.   Neurologic Exam:                    Cognitive -             Orientation: Awake, Alert, AAO to self, place, date, year, situation            Thought: process, content appropriate     Speech - Fluent, Comprehensible, No dysarthria, No aphasia      Cranial Nerves - No facial asymmetry, Tongue midline, EOMI, Shoulder shrug intact     Motor -                      LEFT    UE - ShAB 5/5, EF 5/5, EE 5/5, WE 5/5,  WNL                    RIGHT UE - ShAB 5/5, EF 5/5, EE 5/5, WE 5/5,  WNL                    LEFT    LE - HF 3/5, KE 3/5, DF 4/5, PF 4/5                    RIGHT LE - HF 5/5, KE 5/5, DF 5/5, PF 5/5        Sensory - Intact to LT bilateral     OculoVestibular -  No nystagmus  Psychiatric - Mood stable, Affect WNL  Skin on admission: Aquacel dressing in place on the LLE. otherwise the skin is warm dry & intact. Patient is a 62y old  Female who presents with a chief complaint of Primary Osteoarthritis of Left Knee S/P Total Joint Replacement Arthroplasty (29 May 2023 15:02)      HPI:  This is a 63yo Female with PMH of HTN, HLD, DM and OA who presents to [facility] in 5/22 for orthopedic surgery. Patient s/p left total knee arthroplasty with Dr. Glover on 5/22/2023. Patient tolerated the procedure well without any intraoperative complications. Patient tolerated physical therapy well, and the pain was controlled. Patient is weight bearing as tolerated with cane/walker as needed. Seen by medical attending for continuity of care and management and cleared for safe discharge. Any suture/staples to be removed on post-op day #14. Patient is on 325mg of ASA for DVT prophylaxis for total of 6 weeks.    Patient was evaluated by PM&R and therapy for functional deficits, gait/ADL impairments and acute rehabilitation was recommended. Patient was medically optimized for discharge to [facility] IRU on 5/24/23.        (24 May 2023 16:14)    _____________________________________________________________  SUBJECTIVE/ROS  Patient was seen and evaluated at bedside today.  She was examined and interviewed with the assistance of her daughter Caridad over the phone this morning.   Reported no overnight events and is in no acute distress.  Patient reports that her pain is well-controlled - she has only been taking oxycodone once per day.  Her last BM was yesterday; she reports mild constipation as she has not been eating well (she dislikes hospital food). Denies straining.  Motivated to participate on the recommended rehabilitation program.  Denies any CP, SOB, WALLACE, palpitations, fever, chills, body aches, cough, congestion, or any other symptoms at this time.     _______________________________________________________________  LABS:                        8.4    7.25  )-----------( 310      ( 29 May 2023 07:05 )             25.6     05-29    138  |  102  |  18  ----------------------------<  107<H>  3.9   |  29  |  0.91    Ca    8.8      29 May 2023 07:05    TPro  6.3  /  Alb  2.3<L>  /  TBili  0.4  /  DBili  x   /  AST  16  /  ALT  22  /  AlkPhos  64  05-29      _______________________________________________________________  MEDICATIONS  (STANDING):  acetaminophen     Tablet .. 975 milliGRAM(s) Oral every 12 hours  aspirin 325 milliGRAM(s) Oral two times a day  atorvastatin 10 milliGRAM(s) Oral at bedtime  celecoxib 200 milliGRAM(s) Oral two times a day  dextrose 5%. 1000 milliLiter(s) (50 mL/Hr) IV Continuous <Continuous>  dextrose 5%. 1000 milliLiter(s) (100 mL/Hr) IV Continuous <Continuous>  dextrose 50% Injectable 25 Gram(s) IV Push once  dextrose 50% Injectable 25 Gram(s) IV Push once  dextrose 50% Injectable 12.5 Gram(s) IV Push once  folic acid 1 milliGRAM(s) Oral daily  glucagon  Injectable 1 milliGRAM(s) IntraMuscular once  hydrochlorothiazide 12.5 milliGRAM(s) Oral daily  losartan 50 milliGRAM(s) Oral daily  multivitamin 1 Tablet(s) Oral daily  pantoprazole    Tablet 40 milliGRAM(s) Oral before breakfast  polyethylene glycol 3350 17 Gram(s) Oral at bedtime  senna 2 Tablet(s) Oral at bedtime    MEDICATIONS  (PRN):  dextrose Oral Gel 15 Gram(s) Oral once PRN Blood Glucose LESS THAN 70 milliGRAM(s)/deciliter  oxyCODONE    IR 10 milliGRAM(s) Oral every 4 hours PRN Severe Pain (7 - 10)  oxyCODONE    IR 5 milliGRAM(s) Oral every 4 hours PRN Moderate Pain (4 - 6)  traMADol 50 milliGRAM(s) Oral every 6 hours PRN Mild Pain (1 - 3)    _________________________________________________________________    Physical Exam: Constitutional - NAD, Comfortable  HEENT - NCAT, EOMI  Neck - Supple, No limited ROM  Chest - good chest expansion, good respiratory effort, CTAB   Cardio - warm and well perfused  Abdomen -  Soft, NTND  Extremities - No peripheral edema, No calf tenderness. Aquacel dressing in place on the LLE.   Neurologic Exam:                    Cognitive -             Orientation: Awake, Alert, AAO to self, place, date, year, situation            Thought: process, content appropriate     Speech - Fluent, Comprehensible, No dysarthria, No aphasia      Cranial Nerves - No facial asymmetry, Tongue midline, EOMI, Shoulder shrug intact     Motor -                      LEFT    UE - ShAB 5/5, EF 5/5, EE 5/5, WE 5/5,  WNL                    RIGHT UE - ShAB 5/5, EF 5/5, EE 5/5, WE 5/5,  WNL                    LEFT    LE - HF 3/5, KE 3/5, DF 4/5, PF 4/5                    RIGHT LE - HF 5/5, KE 5/5, DF 5/5, PF 5/5        Sensory - Intact to LT bilateral     OculoVestibular -  No nystagmus  Psychiatric - Mood stable, Affect WNL  Skin on admission: Aquacel dressing in place on the LLE. otherwise the skin is warm dry & intact. HPI:  This is a 61yo Female with PMH of HTN, HLD, DM and OA who presents to [facility] in 5/22 for orthopedic surgery. Patient s/p left total knee arthroplasty with Dr. Glover on 5/22/2023. Patient tolerated the procedure well without any intraoperative complications. Patient tolerated physical therapy well, and the pain was controlled. Patient is weight bearing as tolerated with cane/walker as needed. Seen by medical attending for continuity of care and management and cleared for safe discharge. Any suture/staples to be removed on post-op day #14. Patient is on 325mg of ASA for DVT prophylaxis for total of 6 weeks.    Patient was evaluated by PM&R and therapy for functional deficits, gait/ADL impairments and acute rehabilitation was recommended. Patient was medically optimized for discharge to [facility] IRU on 5/24/23.  (24 May 2023 16:14)    TDD: 6/2 Home  _____________________________________________________________    SUBJECTIVE/ROS  Patient was seen and evaluated at bedside today.  She was interviewed with the assistance of her daughter Caridad over the phone this morning.   Reported no overnight events and is in no acute distress.  Patient reports that her pain is well-controlled - she has only been taking oxycodone once per day.  CBC tomorrow in AM to trend Hg.  Her last BM was yesterday; she reports mild constipation as she has not been eating well (she dislikes hospital food). Denies straining.  Case was discussed at Interdisciplinary Team meeting today.  A tentative discharge date is outlined above.  A lengthy discussion took place with patient and her daughter.  All questions were answered.  They expressed understanding and agreement.   Motivated to participate on the recommended rehabilitation program.  Denies any CP, SOB, WALLACE, palpitations, fever, chills, body aches, cough, congestion, or any other symptoms at this time.     _______________________________________________________________    LABS:                        8.4    7.25  )-----------( 310      ( 29 May 2023 07:05 )             25.6     05-29    138  |  102  |  18  ----------------------------<  107<H>  3.9   |  29  |  0.91    Ca    8.8      29 May 2023 07:05    TPro  6.3  /  Alb  2.3<L>  /  TBili  0.4  /  DBili  x   /  AST  16  /  ALT  22  /  AlkPhos  64  05-29      _______________________________________________________________    MEDICATIONS  (STANDING):  acetaminophen     Tablet .. 975 milliGRAM(s) Oral every 12 hours  aspirin 325 milliGRAM(s) Oral two times a day  atorvastatin 10 milliGRAM(s) Oral at bedtime  celecoxib 200 milliGRAM(s) Oral two times a day  dextrose 5%. 1000 milliLiter(s) (100 mL/Hr) IV Continuous <Continuous>  dextrose 5%. 1000 milliLiter(s) (50 mL/Hr) IV Continuous <Continuous>  dextrose 50% Injectable 25 Gram(s) IV Push once  dextrose 50% Injectable 12.5 Gram(s) IV Push once  dextrose 50% Injectable 25 Gram(s) IV Push once  folic acid 1 milliGRAM(s) Oral daily  glucagon  Injectable 1 milliGRAM(s) IntraMuscular once  hydrochlorothiazide 12.5 milliGRAM(s) Oral daily  losartan 50 milliGRAM(s) Oral daily  multivitamin 1 Tablet(s) Oral daily  pantoprazole    Tablet 40 milliGRAM(s) Oral before breakfast  polyethylene glycol 3350 17 Gram(s) Oral at bedtime  senna 2 Tablet(s) Oral at bedtime    MEDICATIONS  (PRN):  dextrose Oral Gel 15 Gram(s) Oral once PRN Blood Glucose LESS THAN 70 milliGRAM(s)/deciliter  oxyCODONE    IR 5 milliGRAM(s) Oral daily PRN Severe Pain (7 - 10)  traMADol 50 milliGRAM(s) Oral every 6 hours PRN Mild Pain (1 - 3)    _________________________________________________________________    Physical Exam: Constitutional - NAD, Comfortable  HEENT - NCAT, EOMI  Neck - Supple, No limited ROM  Chest - good chest expansion, good respiratory effort, CTAB   Cardio - warm and well perfused  Abdomen -  Soft, NTND  Extremities - No peripheral edema, No calf tenderness. Aquacel dressing in place on the LLE.   Neurologic Exam:                    Cognitive -             Orientation: Awake, Alert, AAO to self, place, date, year, situation            Thought: process, content appropriate     Speech - Fluent, Comprehensible, No dysarthria, No aphasia      Cranial Nerves - No facial asymmetry, Tongue midline, EOMI, Shoulder shrug intact     Motor -                      LEFT    UE - ShAB 5/5, EF 5/5, EE 5/5, WE 5/5,  WNL                    RIGHT UE - ShAB 5/5, EF 5/5, EE 5/5, WE 5/5,  WNL                    LEFT    LE - HF 3/5, KE 3/5, DF 4/5, PF 4/5                    RIGHT LE - HF 5/5, KE 5/5, DF 5/5, PF 5/5        Sensory - Intact to LT bilateral     OculoVestibular -  No nystagmus  Psychiatric - Mood stable, Affect WNL  Skin on admission: Aquacel dressing in place on the LLE. otherwise the skin is warm dry & intact.

## 2023-05-30 NOTE — CHART NOTE - NSCHARTNOTEFT_GEN_A_CORE
REHABILITATION DIAGNOSIS/IMPAIRMENT GROUP CODE:  Primary Osteoarthritis of Left Knee S/P Total Joint Replacement Arthroplasty    COMORBIDITIES/COMPLICATING CONDITIONS IMPACTING REHABILITATION:  HEALTH ISSUES - PROBLEM Dx:  - ADL and mobility impairment  - Need for assistance with personal care  - Patient is weight bearing as tolerated with cane/walker as needed.   - Obesity      PAST MEDICAL & SURGICAL HISTORY:  HTN (hypertension)      HLD (hyperlipidemia)      Type 2 diabetes mellitus      Obesity      History of cholecystectomy      Status post total abdominal hysterectomy and bilateral salpingo-oophorectomy (REGGIE-BSO)      H/O cataract extraction      Status post excision of lipoma          Based upon consideration of the patient's impairments, functional status, complicating conditions and any other contributing factors and after information garnered from the assessments of all therapy disciplines involved in treating the patient and other pertinent clinicians:    INTERDISCIPLINARY REHABILITATION INTERVENTIONS:    [ X  ] Transfer Training  [X   ] Bed Mobility  [ X  ] Therapeutic Exercise  [ X  ] Balance/Coordination Exercises  [ X  ] Locomotion retraining  [ X  ] Stairs  [ X  ] Functional Transfer Training  [ X  ] Bowel/Bladder program  [  X ] Pain Management  [ X  ] Skin/Wound Care  [X   ] Visual/Perceptual Training  [   ] Therapeutic Recreation Activities  [  X ] Neuromuscular Re-education  [  X ] Activities of Daily Living  [   ] Speech Exercise  [   ] Swallowing Exercises  [   ] Vital Stim  [   ] Dietary Supplements  [   ] Calorie Count  [   ] Cognitive Exercises  [   ] Congnitive/Linguistic Treatment  [   ] Behavior Program  [   ] Neuropsych Therapy  [ X  ] Patient/Family Counseling  [ X  ] Family Training  [ X  ] Community Re-entry  [   ] Orthotic Evaluation  [   ] Prosthetic Eval/Training    MEDICAL PROGNOSIS:  good     REHAB POTENTIAL:  good     EXPECTED DAILY THERAPY:         PT: 2hr/day       OT: 1hr/day    EXPECTED INTENSITY OF PROGRAM:  3 hrs/day    EXPECTED FREQUENCY OF PROGRAM:  5 days/week    ESTIMATED LOS:  10-14 days    ESTIMATED DISPOSITION:  home    INTERDISCIPLINARY FUNCTIONAL OUTCOMES?GOALS:         Gait/Mobility: mod-independent       Transfers: mod-independent       ADLs: mod-independent       Functional Transfers: mod-independent       Medication Management: mod-independent       Communication: independent       Cognitive:               Bladder: continent       Bowel: continent
Nutrition Follow Up Note  Hospital Course   (Per Electronic Medical Record)    Source:  Patient Family [X]  Medical Record [X]      Diet:   Consistent Carbohydrate Diet w/ Thin Liquids (IDDSI Level 0)  Tolerates Diet Consistency Well   No Chewing/Swallowing Difficulties (as Per Patient's Family Member)   No Recent Nausea, Vomiting, Diarrhea or Constipation (as Per Patient's Family Member)   Consumes % of Meals Usually (as Per Documentation) - States Good PO Intake/Appetite (Per Patient Daughter)   Obtained Food Preferences from Patient's Daughter  Education Provided on Blood Glucose Management    Enteral/Parenteral Nutrition: Not Applicable    Current Weight: 199.7lb on 5/28  Weights Currently Stable @This Time  Obtain Weights Weekly     Pertinent Medications: MEDICATIONS  (STANDING):  acetaminophen     Tablet .. 975 milliGRAM(s) Oral every 12 hours  aspirin 325 milliGRAM(s) Oral two times a day  atorvastatin 10 milliGRAM(s) Oral at bedtime  celecoxib 200 milliGRAM(s) Oral two times a day  dextrose 5%. 1000 milliLiter(s) (50 mL/Hr) IV Continuous <Continuous>  dextrose 5%. 1000 milliLiter(s) (100 mL/Hr) IV Continuous <Continuous>  dextrose 50% Injectable 25 Gram(s) IV Push once  dextrose 50% Injectable 25 Gram(s) IV Push once  dextrose 50% Injectable 12.5 Gram(s) IV Push once  folic acid 1 milliGRAM(s) Oral daily  glucagon  Injectable 1 milliGRAM(s) IntraMuscular once  hydrochlorothiazide 12.5 milliGRAM(s) Oral daily  losartan 50 milliGRAM(s) Oral daily  multivitamin 1 Tablet(s) Oral daily  pantoprazole    Tablet 40 milliGRAM(s) Oral before breakfast  polyethylene glycol 3350 17 Gram(s) Oral at bedtime  senna 2 Tablet(s) Oral at bedtime    MEDICATIONS  (PRN):  dextrose Oral Gel 15 Gram(s) Oral once PRN Blood Glucose LESS THAN 70 milliGRAM(s)/deciliter  oxyCODONE    IR 5 milliGRAM(s) Oral daily PRN Severe Pain (7 - 10)  traMADol 50 milliGRAM(s) Oral every 6 hours PRN Mild Pain (1 - 3)    Pertinent Labs:  05-29 Na138 mmol/L Glu 107 mg/dL<H> K+ 3.9 mmol/L Cr  0.91 mg/dL BUN 18 mg/dL 05-29 Alb 2.3 g/dL<L>    POCT (over Last 3 Days) - Ranging from 104-153    Skin: No Pressure Ulcers  Surgical Incision on Left Knee  (as Per Nursing Flow Sheet)     Edema: None Noted (as Per Documentation)     Last Bowel Movement: on 5/29    Estimated Needs:   [X] No Change Since Previous Assessment    Previous Nutrition Diagnosis:   Obese    Nutrition Diagnosis is [X] Ongoing - Nutrition Education Provided     New Nutrition Diagnosis: [X] Not Applicable    Interventions:   1. Education Provided on Need for Supplementation   2. Recommend Continue Nutrition Plan of Care     Monitoring & Evaluation:   [X] Weights   [X] PO Intake   [X] Skin Integrity   [X] Follow Up (Per Protocol)  [X] Tolerance to Diet Prescription   [X] Other: Labs & POCT    Registered Dietitian/Nutritionist Remains Available.  Dc Mcmanus RDN    Phone# [phone]

## 2023-05-31 LAB
HCT VFR BLD CALC: 28 % — LOW (ref 34.5–45)
HGB BLD-MCNC: 9 G/DL — LOW (ref 11.5–15.5)
MCHC RBC-ENTMCNC: 29.4 PG — SIGNIFICANT CHANGE UP (ref 27–34)
MCHC RBC-ENTMCNC: 32.1 GM/DL — SIGNIFICANT CHANGE UP (ref 32–36)
MCV RBC AUTO: 91.5 FL — SIGNIFICANT CHANGE UP (ref 80–100)
NRBC # BLD: 0 /100 WBCS — SIGNIFICANT CHANGE UP (ref 0–0)
PLATELET # BLD AUTO: 402 K/UL — HIGH (ref 150–400)
RBC # BLD: 3.06 M/UL — LOW (ref 3.8–5.2)
RBC # FLD: 12.7 % — SIGNIFICANT CHANGE UP (ref 10.3–14.5)
WBC # BLD: 8.37 K/UL — SIGNIFICANT CHANGE UP (ref 3.8–10.5)
WBC # FLD AUTO: 8.37 K/UL — SIGNIFICANT CHANGE UP (ref 3.8–10.5)

## 2023-05-31 PROCEDURE — 99232 SBSQ HOSP IP/OBS MODERATE 35: CPT

## 2023-05-31 RX ADMIN — Medication 1 MILLIGRAM(S): at 12:10

## 2023-05-31 RX ADMIN — SENNA PLUS 2 TABLET(S): 8.6 TABLET ORAL at 21:31

## 2023-05-31 RX ADMIN — CELECOXIB 200 MILLIGRAM(S): 200 CAPSULE ORAL at 07:36

## 2023-05-31 RX ADMIN — OXYCODONE HYDROCHLORIDE 5 MILLIGRAM(S): 5 TABLET ORAL at 21:35

## 2023-05-31 RX ADMIN — Medication 325 MILLIGRAM(S): at 17:16

## 2023-05-31 RX ADMIN — ATORVASTATIN CALCIUM 10 MILLIGRAM(S): 80 TABLET, FILM COATED ORAL at 21:31

## 2023-05-31 RX ADMIN — Medication 975 MILLIGRAM(S): at 17:15

## 2023-05-31 RX ADMIN — Medication 975 MILLIGRAM(S): at 07:00

## 2023-05-31 RX ADMIN — POLYETHYLENE GLYCOL 3350 17 GRAM(S): 17 POWDER, FOR SOLUTION ORAL at 21:31

## 2023-05-31 RX ADMIN — CELECOXIB 200 MILLIGRAM(S): 200 CAPSULE ORAL at 17:15

## 2023-05-31 RX ADMIN — TRAMADOL HYDROCHLORIDE 50 MILLIGRAM(S): 50 TABLET ORAL at 12:10

## 2023-05-31 RX ADMIN — TRAMADOL HYDROCHLORIDE 50 MILLIGRAM(S): 50 TABLET ORAL at 12:50

## 2023-05-31 RX ADMIN — Medication 325 MILLIGRAM(S): at 06:06

## 2023-05-31 RX ADMIN — Medication 975 MILLIGRAM(S): at 06:06

## 2023-05-31 RX ADMIN — CELECOXIB 200 MILLIGRAM(S): 200 CAPSULE ORAL at 06:06

## 2023-05-31 RX ADMIN — Medication 975 MILLIGRAM(S): at 18:00

## 2023-05-31 RX ADMIN — CELECOXIB 200 MILLIGRAM(S): 200 CAPSULE ORAL at 18:00

## 2023-05-31 RX ADMIN — OXYCODONE HYDROCHLORIDE 5 MILLIGRAM(S): 5 TABLET ORAL at 22:05

## 2023-05-31 RX ADMIN — LOSARTAN POTASSIUM 50 MILLIGRAM(S): 100 TABLET, FILM COATED ORAL at 06:06

## 2023-05-31 RX ADMIN — Medication 1 TABLET(S): at 12:10

## 2023-05-31 RX ADMIN — PANTOPRAZOLE SODIUM 40 MILLIGRAM(S): 20 TABLET, DELAYED RELEASE ORAL at 06:07

## 2023-05-31 NOTE — PROGRESS NOTE ADULT - ASSESSMENT
61yo Female with PMH of HTN, HLD, DM and OA, s/p elective left total knee arthroplasty with Dr. Glover on 5/22/2023.     # Left Knee OA s/p Left Total Knee Replacement Arthroplasty  -WBAT  - 325mg of ASA BID for DVT ppx x6 weeks. Take w/ PPI for stomach protection.  - Any suture/staples to be removed on post-op day #14 (6/5/2023).   - comprehensive rehab  - fall precautions    # acute post operative blood loss anemia on top of normocytic anemia  - per patient and daughter. pt has known  history of anemia with extensive outpatient work up.  - trend h/h    #HTN  - per daughter, home regimen - losartan-hctz 100-12.5   -continue current in hospital regimen, adjust as appropriate:  - Losartan 50 mg  - HCTZ 12.5mg  - will follow BP including OH    #HLD  - Atorvastatin 10 mg daily    #DM2  -A1c noted to be 5.6  -Blood sugars have been controlled  - off ISS and FS at this time  -Take Trulicity at home 1x a week - not approved for hospital use    # Constipation  - miralax  - senna and suppository.    DVT PPX: ASA BID

## 2023-05-31 NOTE — PROGRESS NOTE ADULT - SUBJECTIVE AND OBJECTIVE BOX
HPI:  This is a 63yo Female with PMH of HTN, HLD, DM and OA who presents to [facility] in 5/22 for orthopedic surgery. Patient s/p left total knee arthroplasty with Dr. Glover on 5/22/2023. Patient tolerated the procedure well without any intraoperative complications. Patient tolerated physical therapy well, and the pain was controlled. Patient is weight bearing as tolerated with cane/walker as needed. Seen by medical attending for continuity of care and management and cleared for safe discharge. Any suture/staples to be removed on post-op day #14. Patient is on 325mg of ASA for DVT prophylaxis for total of 6 weeks.    Patient was evaluated by PM&R and therapy for functional deficits, gait/ADL impairments and acute rehabilitation was recommended. Patient was medically optimized for discharge to [facility] IRU on 5/24/23.  (24 May 2023 16:14)    TDD: 6/2 Home  _____________________________________________________________    SUBJECTIVE/ROS  Patient was seen and evaluated at bedside today.  She was interviewed with the assistance of her daughter Caridad over the phone this morning.   Reported no overnight events and is in no acute distress.  Patient reports that her pain is well-controlled - she has only been taking oxycodone once per day.  CBC tomorrow in AM to trend Hg.  Her last BM was yesterday; she reports mild constipation as she has not been eating well (she dislikes hospital food). Denies straining.  Case was discussed at Interdisciplinary Team meeting today.  A tentative discharge date is outlined above.  A lengthy discussion took place with patient and her daughter.  All questions were answered.  They expressed understanding and agreement.   Motivated to participate on the recommended rehabilitation program.  Denies any CP, SOB, WALLACE, palpitations, fever, chills, body aches, cough, congestion, or any other symptoms at this time.     _______________________________________________________________    LAB                        9.0    8.37  )-----------( 402      ( 31 May 2023 05:33 )             28.0                                                   8.4    7.25  )-----------( 310      ( 29 May 2023 07:05 )             25.6     05-29    138  |  102  |  18  ----------------------------<  107<H>  3.9   |  29  |  0.91    Ca    8.8      29 May 2023 07:05    TPro  6.3  /  Alb  2.3<L>  /  TBili  0.4  /  DBili  x   /  AST  16  /  ALT  22  /  AlkPhos  64  05-29      _______________________________________________________________    MEDICATIONS  (STANDING):  acetaminophen     Tablet .. 975 milliGRAM(s) Oral every 12 hours  aspirin 325 milliGRAM(s) Oral two times a day  atorvastatin 10 milliGRAM(s) Oral at bedtime  celecoxib 200 milliGRAM(s) Oral two times a day  dextrose 5%. 1000 milliLiter(s) (100 mL/Hr) IV Continuous <Continuous>  dextrose 5%. 1000 milliLiter(s) (50 mL/Hr) IV Continuous <Continuous>  dextrose 50% Injectable 25 Gram(s) IV Push once  dextrose 50% Injectable 12.5 Gram(s) IV Push once  dextrose 50% Injectable 25 Gram(s) IV Push once  folic acid 1 milliGRAM(s) Oral daily  glucagon  Injectable 1 milliGRAM(s) IntraMuscular once  hydrochlorothiazide 12.5 milliGRAM(s) Oral daily  losartan 50 milliGRAM(s) Oral daily  multivitamin 1 Tablet(s) Oral daily  pantoprazole    Tablet 40 milliGRAM(s) Oral before breakfast  polyethylene glycol 3350 17 Gram(s) Oral at bedtime  senna 2 Tablet(s) Oral at bedtime    MEDICATIONS  (PRN):  dextrose Oral Gel 15 Gram(s) Oral once PRN Blood Glucose LESS THAN 70 milliGRAM(s)/deciliter  oxyCODONE    IR 5 milliGRAM(s) Oral daily PRN Severe Pain (7 - 10)  traMADol 50 milliGRAM(s) Oral every 6 hours PRN Mild Pain (1 - 3)    _________________________________________________________________    Physical Exam: Constitutional - NAD, Comfortable  HEENT - NCAT, EOMI  Neck - Supple, No limited ROM  Chest - good chest expansion, good respiratory effort, CTAB   Cardio - warm and well perfused  Abdomen -  Soft, NTND  Extremities - No peripheral edema, No calf tenderness. Aquacel dressing in place on the LLE.   Neurologic Exam:                    Cognitive -             Orientation: Awake, Alert, AAO to self, place, date, year, situation            Thought: process, content appropriate     Speech - Fluent, Comprehensible, No dysarthria, No aphasia      Cranial Nerves - No facial asymmetry, Tongue midline, EOMI, Shoulder shrug intact     Motor -                      LEFT    UE - ShAB 5/5, EF 5/5, EE 5/5, WE 5/5,  WNL                    RIGHT UE - ShAB 5/5, EF 5/5, EE 5/5, WE 5/5,  WNL                    LEFT    LE - HF 3/5, KE 3/5, DF 4/5, PF 4/5                    RIGHT LE - HF 5/5, KE 5/5, DF 5/5, PF 5/5        Sensory - Intact to LT bilateral     OculoVestibular -  No nystagmus  Psychiatric - Mood stable, Affect WNL  Skin on admission: Aquacel dressing in place on the LLE. otherwise the skin is warm dry & intact. HPI:  This is a 61yo Female with PMH of HTN, HLD, DM and OA who presents to [facility] in 5/22 for orthopedic surgery. Patient s/p left total knee arthroplasty with Dr. Glover on 5/22/2023. Patient tolerated the procedure well without any intraoperative complications. Patient tolerated physical therapy well, and the pain was controlled. Patient is weight bearing as tolerated with cane/walker as needed. Seen by medical attending for continuity of care and management and cleared for safe discharge. Any suture/staples to be removed on post-op day #14. Patient is on 325mg of ASA for DVT prophylaxis for total of 6 weeks.    Patient was evaluated by PM&R and therapy for functional deficits, gait/ADL impairments and acute rehabilitation was recommended. Patient was medically optimized for discharge to [facility] IRU on 5/24/23.  (24 May 2023 16:14)    TDD: 6/2 Home  _____________________________________________________________    SUBJECTIVE/ROS  Patient was seen and evaluated at bedside today.  Reported no overnight events and is in no acute distress.  Patient reports that her pain is well-controlled - she remains on oxycodone once per day.  CBC today with improving Hg.  Motivated to participate on the recommended rehabilitation program.  Denies any CP, SOB, WALLACE, palpitations, fever, chills, body aches, cough, congestion, or any other symptoms at this time.     _______________________________________________________________    Vital Signs Last 24 Hrs  T(C): 36.7 (31 May 2023 08:30), Max: 37.2 (30 May 2023 21:42)  T(F): 98 (31 May 2023 08:30), Max: 98.9 (30 May 2023 21:42)  HR: 69 (31 May 2023 08:30) (66 - 69)  BP: 152/83 (31 May 2023 08:30) (124/71 - 152/83)  RR: 15 (31 May 2023 08:30) (14 - 15)  SpO2: 99% (31 May 2023 08:30) (98% - 99%)    _______________________________________________________________    LAB                        9.0    8.37  )-----------( 402      ( 31 May 2023 05:33 )             28.0                          8.4    7.25  )-----------( 310      ( 29 May 2023 07:05 )             25.6     05-29    138  |  102  |  18  ----------------------------<  107<H>  3.9   |  29  |  0.91    Ca    8.8      29 May 2023 07:05    TPro  6.3  /  Alb  2.3<L>  /  TBili  0.4  /  DBili  x   /  AST  16  /  ALT  22  /  AlkPhos  64  05-29      _______________________________________________________________    MEDICATIONS  (STANDING):  acetaminophen     Tablet .. 975 milliGRAM(s) Oral every 12 hours  aspirin 325 milliGRAM(s) Oral two times a day  atorvastatin 10 milliGRAM(s) Oral at bedtime  celecoxib 200 milliGRAM(s) Oral two times a day  dextrose 5%. 1000 milliLiter(s) (100 mL/Hr) IV Continuous <Continuous>  dextrose 5%. 1000 milliLiter(s) (50 mL/Hr) IV Continuous <Continuous>  dextrose 50% Injectable 25 Gram(s) IV Push once  dextrose 50% Injectable 12.5 Gram(s) IV Push once  dextrose 50% Injectable 25 Gram(s) IV Push once  folic acid 1 milliGRAM(s) Oral daily  glucagon  Injectable 1 milliGRAM(s) IntraMuscular once  hydrochlorothiazide 12.5 milliGRAM(s) Oral daily  losartan 50 milliGRAM(s) Oral daily  multivitamin 1 Tablet(s) Oral daily  pantoprazole    Tablet 40 milliGRAM(s) Oral before breakfast  polyethylene glycol 3350 17 Gram(s) Oral at bedtime  senna 2 Tablet(s) Oral at bedtime    MEDICATIONS  (PRN):  bisacodyl Suppository 10 milliGRAM(s) Rectal daily PRN Constipation  dextrose Oral Gel 15 Gram(s) Oral once PRN Blood Glucose LESS THAN 70 milliGRAM(s)/deciliter  oxyCODONE    IR 5 milliGRAM(s) Oral daily PRN Severe Pain (7 - 10)  traMADol 50 milliGRAM(s) Oral every 6 hours PRN Mild Pain (1 - 3)    _________________________________________________________________    Physical Exam: Constitutional - NAD, Comfortable  HEENT - NCAT, EOMI  Neck - Supple, No limited ROM  Chest - good chest expansion, good respiratory effort, CTAB   Cardio - warm and well perfused  Abdomen -  Soft, NTND  Extremities - No peripheral edema, No calf tenderness. Aquacel dressing in place on the LLE.   Neurologic Exam:                    Cognitive -             Orientation: Awake, Alert, AAO to self, place, date, year, situation            Thought: process, content appropriate     Speech - Fluent, Comprehensible, No dysarthria, No aphasia      Cranial Nerves - No facial asymmetry, Tongue midline, EOMI, Shoulder shrug intact     Motor -                      LEFT    UE - ShAB 5/5, EF 5/5, EE 5/5, WE 5/5,  WNL                    RIGHT UE - ShAB 5/5, EF 5/5, EE 5/5, WE 5/5,  WNL                    LEFT    LE - HF 3/5, KE 3/5, DF 4/5, PF 4/5                    RIGHT LE - HF 5/5, KE 5/5, DF 5/5, PF 5/5        Sensory - Intact to LT bilateral     OculoVestibular -  No nystagmus  Psychiatric - Mood stable, Affect WNL  Skin on admission: Aquacel dressing in place on the LLE. otherwise the skin is warm dry & intact.

## 2023-05-31 NOTE — PROGRESS NOTE ADULT - SUBJECTIVE AND OBJECTIVE BOX
Medicine Progress Note    Patient is a 62y old  Female who presents with a chief complaint of Primary Osteoarthritis of Left Knee S/P Total Joint Replacement Arthroplasty (30 May 2023 09:06)      SUBJECTIVE / OVERNIGHT EVENTS:  seen and examined  Chart reviewed  No overnight events  Limb weakness improving with therapy  BM+  No pain  No complaints    ADDITIONAL REVIEW OF SYSTEMS:  denied fever/chills/CP/SOB/cough/palpitation/dizziness/abdominal pian/nausea/vomiting/diarrhoea/constipation/dysuria/leg or calf pain/headaches.all other ROS neg    MEDICATIONS  (STANDING):  acetaminophen     Tablet .. 975 milliGRAM(s) Oral every 12 hours  aspirin 325 milliGRAM(s) Oral two times a day  atorvastatin 10 milliGRAM(s) Oral at bedtime  celecoxib 200 milliGRAM(s) Oral two times a day  dextrose 5%. 1000 milliLiter(s) (100 mL/Hr) IV Continuous <Continuous>  dextrose 5%. 1000 milliLiter(s) (50 mL/Hr) IV Continuous <Continuous>  dextrose 50% Injectable 25 Gram(s) IV Push once  dextrose 50% Injectable 12.5 Gram(s) IV Push once  dextrose 50% Injectable 25 Gram(s) IV Push once  folic acid 1 milliGRAM(s) Oral daily  glucagon  Injectable 1 milliGRAM(s) IntraMuscular once  hydrochlorothiazide 12.5 milliGRAM(s) Oral daily  losartan 50 milliGRAM(s) Oral daily  multivitamin 1 Tablet(s) Oral daily  pantoprazole    Tablet 40 milliGRAM(s) Oral before breakfast  polyethylene glycol 3350 17 Gram(s) Oral at bedtime  senna 2 Tablet(s) Oral at bedtime    MEDICATIONS  (PRN):  bisacodyl Suppository 10 milliGRAM(s) Rectal daily PRN Constipation  dextrose Oral Gel 15 Gram(s) Oral once PRN Blood Glucose LESS THAN 70 milliGRAM(s)/deciliter  oxyCODONE    IR 5 milliGRAM(s) Oral daily PRN Severe Pain (7 - 10)  traMADol 50 milliGRAM(s) Oral every 6 hours PRN Mild Pain (1 - 3)    CAPILLARY BLOOD GLUCOSE        I&O's Summary      PHYSICAL EXAM:  Vital Signs Last 24 Hrs  T(C): 36.7 (31 May 2023 08:30), Max: 37.2 (30 May 2023 21:42)  T(F): 98 (31 May 2023 08:30), Max: 98.9 (30 May 2023 21:42)  HR: 69 (31 May 2023 08:30) (66 - 69)  BP: 152/83 (31 May 2023 08:30) (124/71 - 152/83)  BP(mean): --  RR: 15 (31 May 2023 08:30) (14 - 15)  SpO2: 99% (31 May 2023 08:30) (98% - 99%)    Parameters below as of 31 May 2023 08:30  Patient On (Oxygen Delivery Method): room air    GENERAL: Not in distress. Alert    HEENT: clear conjuctiva, MMM. no pallor or icterus  CARDIOVASCULAR: RRR S1, S2. No murmur/rubs/gallop  LUNGS: BLAE+, no rales, no wheezing, no rhonchi.    ABDOMEN: ND. Soft,  NT, no guarding / rebound / rigidity. BS normoactive  BACK: No spine tenderness.  EXTREMITIES: no edema. no leg or calf TP.  SKIN: warm and dry  PSYCHIATRIC: Calm.  No agitation.  CNS: AAO*3. moving limbs. follows commands    LABS:                        9.0    8.37  )-----------( 402      ( 31 May 2023 05:33 )             28.0                     COVID-19 PCR: NotDetec (23 May 2023 12:32)      RADIOLOGY & ADDITIONAL TESTS:  Imaging from Last 24 Hours:    Electrocardiogram/QTc Interval:    COORDINATION OF CARE:  Care Discussed with Consultants/Other Providers:

## 2023-05-31 NOTE — PROGRESS NOTE ADULT - ASSESSMENT
This is a 61yo Female with PMH of HTN, HLD, DM and OA who presents to [facility] in 5/22 for orthopedic surgery. Patient s/p left total knee arthroplasty with Dr. Glover on 5/22/2023. Patient tolerated the procedure well without any intraoperative complications. Patient tolerated physical therapy well, and the pain was controlled. Patient is weight bearing as tolerated with cane/walker as needed. Seen by medical attending for continuity of care and management and cleared for safe discharge. Any suture/staples to be removed on post-op day #14. Patient is on 325mg of ASA for DVT prophylaxis for total of 6 weeks.    # Primary Osteoarthritis of Left Knee S/P Total Joint Replacement Arthroplasty  - ADL and mobility impairment  - Need for assistance with personal care  - Patient is weight bearing as tolerated with cane/walker as needed.   - 325mg of ASA BID for DVT prophylaxis, for 6 weeks.   - begin comprehensive rehab program OT, PT, SLP  3 hours daily 5 x week  - Precautions: fall.     #Obesity    #HTN  - Losartan 50 mg  - HCTZ 12.5mg  - monitor vitals, hospitalist in    #DM   -ISS   -Monitor glucose   -Take Trulicity at home 1x a week. Daughter states she can bring in it.     #HLD  - Atorvastatin 10 mg daily    #Pain management  - Celebrex 200 BID.   - Oxycodone 5mg daily PRN  - Tizanidne 2mg PRN    GI/Bowel:  - At risk for constipation due to neurologic diagnosis, immobility and/or medication use  - Senna QHS, Miralax Daily  - GI ppx: Protonix    /Bladder:   - At risk for incontinence and retention due to neurologic diagnosis and limited mobility  - Continue catheter/bladder nursing protocol with bladder scans per routine with straight cath for >400cc.  - Encourage timed voids every 4 hours while awake for independence and to promote continence during therapy.      Skin/Pressure Injury:   - At risk for pressure injury due to neurologic diagnosis and relative immobility.  - Skin assessment on admission: Aquacel dressing in place on the LLE  - Turn every 2 hours while in bed, air mattress  - Soft heel protectors  - Skin barrier cream as needed  - Nursing to monitor skin Qshift    #FEN   - Diet: Regular     #DVT ppx  -  BID      Outpatient Follow-up (Specialty/Name of physician):    Jose Antonio Glover)  Orthopedics  [address] [facility], [address] [place] [address]  Phone: [phone]  Fax: [phone]  Follow Up Time: 2 weeks     This is a 61yo Female with PMH of HTN, HLD, DM and OA who presents to [facility] in 5/22 for orthopedic surgery. Patient s/p left total knee arthroplasty with Dr. Glover on 5/22/2023. Patient tolerated the procedure well without any intraoperative complications. Patient tolerated physical therapy well, and the pain was controlled. Patient is weight bearing as tolerated with cane/walker as needed. Seen by medical attending for continuity of care and management and cleared for safe discharge. Any suture/staples to be removed on post-op day #14. Patient is on 325mg of ASA for DVT prophylaxis for total of 6 weeks.    # Primary Osteoarthritis of Left Knee S/P Total Joint Replacement Arthroplasty  - ADL and mobility impairment  - Need for assistance with personal care  - Patient is weight bearing as tolerated with cane/walker as needed.   - 325mg of ASA BID for DVT prophylaxis, for 6 weeks.   - begin comprehensive rehab program OT, PT, SLP  3 hours daily 5 x week  - Precautions: fall.     #Obesity    #HTN  - Losartan 50 mg  - HCTZ 12.5mg  - monitor vitals, hospitalist in    #DM   -ISS   -Monitor glucose   -Take Trulicity at home 1x a week. Daughter states she can bring in it.     #HLD  - Atorvastatin 10 mg daily    #Pain management  - Celebrex 200 BID.   - Oxycodone 5mg daily PRN  - Tizanidne 2mg PRN    GI/Bowel:  - At risk for constipation due to neurologic diagnosis, immobility and/or medication use  - Senna QHS, Miralax Daily  - GI ppx: Protonix    /Bladder:   - At risk for incontinence and retention due to neurologic diagnosis and limited mobility  - Continue catheter/bladder nursing protocol with bladder scans per routine with straight cath for >400cc.  - Encourage timed voids every 4 hours while awake for independence and to promote continence during therapy.      Skin/Pressure Injury:   - At risk for pressure injury due to neurologic diagnosis and relative immobility.  - Skin assessment on admission: Aquacel dressing in place on the LLE  - Turn every 2 hours while in bed, air mattress  - Soft heel protectors  - Skin barrier cream as needed  - Nursing to monitor skin Qshift    #FEN   - Diet: Regular     #DVT ppx  -  BID      Outpatient Follow-up (Specialty/Name of physician):    Jose Antonio Glover)  Orthopedics  [address] [facility], [address] [place] [address]  Phone: [phone]  Fax: [phone]  Follow Up Time: 2 weeks    ******************************    IDT 5/30  TDD: 6/2 Home  SW- Lives with son and daughter in law in home with 5 steps to enter, 1 flight to bedroom ( 17 steps )   Independent at baseline  RN- Supervision B/B  OT- CG txf to commode and shower, Camron LBD due to pain; goals for Char all ADLs and transfers, Char ambulation with AD  PT- supervision BM, CG transfers, 150' RW supervision, 12 steps 1 HR CG/Sup    ******************************

## 2023-06-01 ENCOUNTER — TRANSCRIPTION ENCOUNTER (OUTPATIENT)
Age: 63
End: 2023-06-01

## 2023-06-01 VITALS
DIASTOLIC BLOOD PRESSURE: 82 MMHG | SYSTOLIC BLOOD PRESSURE: 155 MMHG | TEMPERATURE: 98 F | RESPIRATION RATE: 16 BRPM | OXYGEN SATURATION: 100 % | HEART RATE: 67 BPM

## 2023-06-01 LAB
ALBUMIN SERPL ELPH-MCNC: 2.4 G/DL — LOW (ref 3.3–5)
ALP SERPL-CCNC: 68 U/L — SIGNIFICANT CHANGE UP (ref 40–120)
ALT FLD-CCNC: 24 U/L — SIGNIFICANT CHANGE UP (ref 10–45)
ANION GAP SERPL CALC-SCNC: 5 MMOL/L — SIGNIFICANT CHANGE UP (ref 5–17)
AST SERPL-CCNC: 20 U/L — SIGNIFICANT CHANGE UP (ref 10–40)
BILIRUB SERPL-MCNC: 0.3 MG/DL — SIGNIFICANT CHANGE UP (ref 0.2–1.2)
BUN SERPL-MCNC: 14 MG/DL — SIGNIFICANT CHANGE UP (ref 7–23)
CALCIUM SERPL-MCNC: 8.4 MG/DL — SIGNIFICANT CHANGE UP (ref 8.4–10.5)
CHLORIDE SERPL-SCNC: 105 MMOL/L — SIGNIFICANT CHANGE UP (ref 96–108)
CO2 SERPL-SCNC: 30 MMOL/L — SIGNIFICANT CHANGE UP (ref 22–31)
CREAT SERPL-MCNC: 0.62 MG/DL — SIGNIFICANT CHANGE UP (ref 0.5–1.3)
EGFR: 100 ML/MIN/1.73M2 — SIGNIFICANT CHANGE UP
GLUCOSE SERPL-MCNC: 122 MG/DL — HIGH (ref 70–99)
HCT VFR BLD CALC: 26.7 % — LOW (ref 34.5–45)
HGB BLD-MCNC: 8.6 G/DL — LOW (ref 11.5–15.5)
MCHC RBC-ENTMCNC: 29.6 PG — SIGNIFICANT CHANGE UP (ref 27–34)
MCHC RBC-ENTMCNC: 32.2 GM/DL — SIGNIFICANT CHANGE UP (ref 32–36)
MCV RBC AUTO: 91.8 FL — SIGNIFICANT CHANGE UP (ref 80–100)
NRBC # BLD: 0 /100 WBCS — SIGNIFICANT CHANGE UP (ref 0–0)
PLATELET # BLD AUTO: 387 K/UL — SIGNIFICANT CHANGE UP (ref 150–400)
POTASSIUM SERPL-MCNC: 3.9 MMOL/L — SIGNIFICANT CHANGE UP (ref 3.5–5.3)
POTASSIUM SERPL-SCNC: 3.9 MMOL/L — SIGNIFICANT CHANGE UP (ref 3.5–5.3)
PROT SERPL-MCNC: 6.2 G/DL — SIGNIFICANT CHANGE UP (ref 6–8.3)
RBC # BLD: 2.91 M/UL — LOW (ref 3.8–5.2)
RBC # FLD: 12.6 % — SIGNIFICANT CHANGE UP (ref 10.3–14.5)
SODIUM SERPL-SCNC: 140 MMOL/L — SIGNIFICANT CHANGE UP (ref 135–145)
WBC # BLD: 7.2 K/UL — SIGNIFICANT CHANGE UP (ref 3.8–10.5)
WBC # FLD AUTO: 7.2 K/UL — SIGNIFICANT CHANGE UP (ref 3.8–10.5)

## 2023-06-01 PROCEDURE — 83550 IRON BINDING TEST: CPT

## 2023-06-01 PROCEDURE — 97530 THERAPEUTIC ACTIVITIES: CPT

## 2023-06-01 PROCEDURE — 36415 COLL VENOUS BLD VENIPUNCTURE: CPT

## 2023-06-01 PROCEDURE — 82962 GLUCOSE BLOOD TEST: CPT

## 2023-06-01 PROCEDURE — 97116 GAIT TRAINING THERAPY: CPT

## 2023-06-01 PROCEDURE — 97163 PT EVAL HIGH COMPLEX 45 MIN: CPT

## 2023-06-01 PROCEDURE — 82607 VITAMIN B-12: CPT

## 2023-06-01 PROCEDURE — 83036 HEMOGLOBIN GLYCOSYLATED A1C: CPT

## 2023-06-01 PROCEDURE — 97110 THERAPEUTIC EXERCISES: CPT

## 2023-06-01 PROCEDURE — 82746 ASSAY OF FOLIC ACID SERUM: CPT

## 2023-06-01 PROCEDURE — 99232 SBSQ HOSP IP/OBS MODERATE 35: CPT

## 2023-06-01 PROCEDURE — 80053 COMPREHEN METABOLIC PANEL: CPT

## 2023-06-01 PROCEDURE — 86803 HEPATITIS C AB TEST: CPT

## 2023-06-01 PROCEDURE — 97167 OT EVAL HIGH COMPLEX 60 MIN: CPT

## 2023-06-01 PROCEDURE — 97535 SELF CARE MNGMENT TRAINING: CPT

## 2023-06-01 PROCEDURE — 84466 ASSAY OF TRANSFERRIN: CPT

## 2023-06-01 PROCEDURE — 83540 ASSAY OF IRON: CPT

## 2023-06-01 PROCEDURE — 82728 ASSAY OF FERRITIN: CPT

## 2023-06-01 PROCEDURE — 85027 COMPLETE CBC AUTOMATED: CPT

## 2023-06-01 RX ORDER — OXYCODONE HYDROCHLORIDE 5 MG/1
1 TABLET ORAL
Qty: 25 | Refills: 0
Start: 2023-06-01 | End: 2023-06-05

## 2023-06-01 RX ORDER — CELECOXIB 200 MG/1
1 CAPSULE ORAL
Qty: 60 | Refills: 0
Start: 2023-06-01 | End: 2023-06-30

## 2023-06-01 RX ORDER — ACETAMINOPHEN 500 MG
3 TABLET ORAL
Qty: 0 | Refills: 0 | DISCHARGE
Start: 2023-06-01

## 2023-06-01 RX ORDER — FOLIC ACID 0.8 MG
1 TABLET ORAL
Qty: 30 | Refills: 0
Start: 2023-06-01 | End: 2023-06-30

## 2023-06-01 RX ORDER — FOLIC ACID 0.8 MG
0 TABLET ORAL
Refills: 0 | DISCHARGE

## 2023-06-01 RX ORDER — ASPIRIN/CALCIUM CARB/MAGNESIUM 324 MG
1 TABLET ORAL
Qty: 66 | Refills: 0
Start: 2023-06-01 | End: 2023-07-03

## 2023-06-01 RX ORDER — TRAMADOL HYDROCHLORIDE 50 MG/1
1 TABLET ORAL
Qty: 20 | Refills: 0
Start: 2023-06-01 | End: 2023-06-05

## 2023-06-01 RX ORDER — TIZANIDINE 4 MG/1
2 TABLET ORAL
Refills: 0 | DISCHARGE

## 2023-06-01 RX ORDER — CELECOXIB 200 MG/1
1 CAPSULE ORAL
Refills: 0 | DISCHARGE

## 2023-06-01 RX ADMIN — Medication 975 MILLIGRAM(S): at 05:08

## 2023-06-01 RX ADMIN — CELECOXIB 200 MILLIGRAM(S): 200 CAPSULE ORAL at 05:35

## 2023-06-01 RX ADMIN — Medication 325 MILLIGRAM(S): at 17:28

## 2023-06-01 RX ADMIN — Medication 1 MILLIGRAM(S): at 11:51

## 2023-06-01 RX ADMIN — Medication 975 MILLIGRAM(S): at 17:58

## 2023-06-01 RX ADMIN — OXYCODONE HYDROCHLORIDE 5 MILLIGRAM(S): 5 TABLET ORAL at 17:04

## 2023-06-01 RX ADMIN — PANTOPRAZOLE SODIUM 40 MILLIGRAM(S): 20 TABLET, DELAYED RELEASE ORAL at 05:08

## 2023-06-01 RX ADMIN — Medication 975 MILLIGRAM(S): at 05:35

## 2023-06-01 RX ADMIN — LOSARTAN POTASSIUM 50 MILLIGRAM(S): 100 TABLET, FILM COATED ORAL at 05:08

## 2023-06-01 RX ADMIN — OXYCODONE HYDROCHLORIDE 5 MILLIGRAM(S): 5 TABLET ORAL at 16:34

## 2023-06-01 RX ADMIN — CELECOXIB 200 MILLIGRAM(S): 200 CAPSULE ORAL at 17:27

## 2023-06-01 RX ADMIN — Medication 975 MILLIGRAM(S): at 17:28

## 2023-06-01 RX ADMIN — Medication 1 TABLET(S): at 11:51

## 2023-06-01 RX ADMIN — CELECOXIB 200 MILLIGRAM(S): 200 CAPSULE ORAL at 17:57

## 2023-06-01 RX ADMIN — CELECOXIB 200 MILLIGRAM(S): 200 CAPSULE ORAL at 05:08

## 2023-06-01 RX ADMIN — Medication 325 MILLIGRAM(S): at 05:08

## 2023-06-01 NOTE — PROGRESS NOTE ADULT - SUBJECTIVE AND OBJECTIVE BOX
HPI:  This is a 63yo Female with PMH of HTN, HLD, DM and OA who presents to [facility] in 5/22 for orthopedic surgery. Patient s/p left total knee arthroplasty with Dr. Glover on 5/22/2023. Patient tolerated the procedure well without any intraoperative complications. Patient tolerated physical therapy well, and the pain was controlled. Patient is weight bearing as tolerated with cane/walker as needed. Seen by medical attending for continuity of care and management and cleared for safe discharge. Any suture/staples to be removed on post-op day #14. Patient is on 325mg of ASA for DVT prophylaxis for total of 6 weeks.    Patient was evaluated by PM&R and therapy for functional deficits, gait/ADL impairments and acute rehabilitation was recommended. Patient was medically optimized for discharge to [facility] IRU on 5/24/23.  (24 May 2023 16:14)    TDD: 6/2 Home  _____________________________________________________________    SUBJECTIVE/ROS  Patient was seen and evaluated at bedside today.  Reported no overnight events and is in no acute distress.      Patient reports that her pain is well-controlled - she remains on oxycodone once per day.  CBC today with improving Hg.  Motivated to participate on the recommended rehabilitation program.  Denies any CP, SOB, WALLACE, palpitations, fever, chills, body aches, cough, congestion, or any other symptoms at this time.     _______________________________________________________________    VITAL SIGNS:  Vital Signs Last 24 Hrs  T(C): 36.8 (31 May 2023 19:45), Max: 36.8 (31 May 2023 19:45)  T(F): 98.3 (31 May 2023 19:45), Max: 98.3 (31 May 2023 19:45)  HR: 55 (01 Jun 2023 05:07) (55 - 69)  BP: 149/64 (01 Jun 2023 05:07) (149/64 - 157/87)  BP(mean): --  RR: 15 (31 May 2023 19:45) (15 - 15)  SpO2: 98% (31 May 2023 19:45) (98% - 99%)    Parameters below as of 31 May 2023 19:45  Patient On (Oxygen Delivery Method): room air      _______________________________________________________________  LABS:                        8.6    7.20  )-----------( 387      ( 01 Jun 2023 05:26 )             26.7     06-01    140  |  105  |  14  ----------------------------<  122<H>  3.9   |  30  |  0.62    Ca    8.4      01 Jun 2023 05:26    TPro  6.2  /  Alb  2.4<L>  /  TBili  0.3  /  DBili  x   /  AST  20  /  ALT  24  /  AlkPhos  68  06-01      _______________________________________________________________  MEDICATIONS  (STANDING):  acetaminophen     Tablet .. 975 milliGRAM(s) Oral every 12 hours  aspirin 325 milliGRAM(s) Oral two times a day  atorvastatin 10 milliGRAM(s) Oral at bedtime  celecoxib 200 milliGRAM(s) Oral two times a day  dextrose 5%. 1000 milliLiter(s) (50 mL/Hr) IV Continuous <Continuous>  dextrose 5%. 1000 milliLiter(s) (100 mL/Hr) IV Continuous <Continuous>  dextrose 50% Injectable 25 Gram(s) IV Push once  dextrose 50% Injectable 12.5 Gram(s) IV Push once  dextrose 50% Injectable 25 Gram(s) IV Push once  folic acid 1 milliGRAM(s) Oral daily  glucagon  Injectable 1 milliGRAM(s) IntraMuscular once  hydrochlorothiazide 12.5 milliGRAM(s) Oral daily  losartan 50 milliGRAM(s) Oral daily  multivitamin 1 Tablet(s) Oral daily  pantoprazole    Tablet 40 milliGRAM(s) Oral before breakfast  polyethylene glycol 3350 17 Gram(s) Oral at bedtime  senna 2 Tablet(s) Oral at bedtime    MEDICATIONS  (PRN):  bisacodyl Suppository 10 milliGRAM(s) Rectal daily PRN Constipation  dextrose Oral Gel 15 Gram(s) Oral once PRN Blood Glucose LESS THAN 70 milliGRAM(s)/deciliter  oxyCODONE    IR 5 milliGRAM(s) Oral daily PRN Severe Pain (7 - 10)  traMADol 50 milliGRAM(s) Oral every 6 hours PRN Mild Pain (1 - 3)    _________________________________________________________________    Physical Exam: Constitutional - NAD, Comfortable  HEENT - NCAT, EOMI  Neck - Supple, No limited ROM  Chest - good chest expansion, good respiratory effort, CTAB   Cardio - warm and well perfused  Abdomen -  Soft, NTND  Extremities - No peripheral edema, No calf tenderness. Aquacel dressing in place on the LLE.   Neurologic Exam:                    Cognitive -             Orientation: Awake, Alert, AAO to self, place, date, year, situation            Thought: process, content appropriate     Speech - Fluent, Comprehensible, No dysarthria, No aphasia      Cranial Nerves - No facial asymmetry, Tongue midline, EOMI, Shoulder shrug intact     Motor -                      LEFT    UE - ShAB 5/5, EF 5/5, EE 5/5, WE 5/5,  WNL                    RIGHT UE - ShAB 5/5, EF 5/5, EE 5/5, WE 5/5,  WNL                    LEFT    LE - HF 3/5, KE 3/5, DF 4/5, PF 4/5                    RIGHT LE - HF 5/5, KE 5/5, DF 5/5, PF 5/5        Sensory - Intact to LT bilateral     OculoVestibular -  No nystagmus  Psychiatric - Mood stable, Affect WNL  Skin on admission: Aquacel dressing in place on the LLE. otherwise the skin is warm dry & intact. HPI:  This is a 63yo Female with PMH of HTN, HLD, DM and OA who presents to [facility] in 5/22 for orthopedic surgery. Patient s/p left total knee arthroplasty with Dr. Glover on 5/22/2023. Patient tolerated the procedure well without any intraoperative complications. Patient tolerated physical therapy well, and the pain was controlled. Patient is weight bearing as tolerated with cane/walker as needed. Seen by medical attending for continuity of care and management and cleared for safe discharge. Any suture/staples to be removed on post-op day #14. Patient is on 325mg of ASA for DVT prophylaxis for total of 6 weeks.    Patient was evaluated by PM&R and therapy for functional deficits, gait/ADL impairments and acute rehabilitation was recommended. Patient was medically optimized for discharge to [facility] IRU on 5/24/23.  (24 May 2023 16:14)    TDD: 6/2 Home  _____________________________________________________________    SUBJECTIVE/ROS  Patient was seen and evaluated at bedside today - history obtained with the assistance of the patient's daughter via phone per patient's preferences   Reported no overnight events and is in no acute distress.  Patient reports that her pain is well-controlled - she remains on oxycodone once per day which she has been taking primarily in the evening  CBC today with stable Hg - will continue to trend routinely.  Patient and family are requesting discharge tonight instead of tomorrow AM due to logistics issues of picking her up tomorrow.  Team is actively organizing delivery of DME.  Patient is moving her bowels regularly without issue.  Motivated to participate on the recommended rehabilitation program.  Denies any CP, SOB, WALLACE, palpitations, fever, chills, body aches, cough, congestion, or any other symptoms at this time.     _______________________________________________________________    VITAL SIGNS:  Vital Signs Last 24 Hrs  T(C): 36.8 (31 May 2023 19:45), Max: 36.8 (31 May 2023 19:45)  T(F): 98.3 (31 May 2023 19:45), Max: 98.3 (31 May 2023 19:45)  HR: 55 (01 Jun 2023 05:07) (55 - 69)  BP: 149/64 (01 Jun 2023 05:07) (149/64 - 157/87)  BP(mean): --  RR: 15 (31 May 2023 19:45) (15 - 15)  SpO2: 98% (31 May 2023 19:45) (98% - 99%)    Parameters below as of 31 May 2023 19:45  Patient On (Oxygen Delivery Method): room air      _______________________________________________________________  LABS:                        8.6    7.20  )-----------( 387      ( 01 Jun 2023 05:26 )             26.7     06-01    140  |  105  |  14  ----------------------------<  122<H>  3.9   |  30  |  0.62    Ca    8.4      01 Jun 2023 05:26    TPro  6.2  /  Alb  2.4<L>  /  TBili  0.3  /  DBili  x   /  AST  20  /  ALT  24  /  AlkPhos  68  06-01      _______________________________________________________________  MEDICATIONS  (STANDING):  acetaminophen     Tablet .. 975 milliGRAM(s) Oral every 12 hours  aspirin 325 milliGRAM(s) Oral two times a day  atorvastatin 10 milliGRAM(s) Oral at bedtime  celecoxib 200 milliGRAM(s) Oral two times a day  dextrose 5%. 1000 milliLiter(s) (50 mL/Hr) IV Continuous <Continuous>  dextrose 5%. 1000 milliLiter(s) (100 mL/Hr) IV Continuous <Continuous>  dextrose 50% Injectable 25 Gram(s) IV Push once  dextrose 50% Injectable 12.5 Gram(s) IV Push once  dextrose 50% Injectable 25 Gram(s) IV Push once  folic acid 1 milliGRAM(s) Oral daily  glucagon  Injectable 1 milliGRAM(s) IntraMuscular once  hydrochlorothiazide 12.5 milliGRAM(s) Oral daily  losartan 50 milliGRAM(s) Oral daily  multivitamin 1 Tablet(s) Oral daily  pantoprazole    Tablet 40 milliGRAM(s) Oral before breakfast  polyethylene glycol 3350 17 Gram(s) Oral at bedtime  senna 2 Tablet(s) Oral at bedtime    MEDICATIONS  (PRN):  bisacodyl Suppository 10 milliGRAM(s) Rectal daily PRN Constipation  dextrose Oral Gel 15 Gram(s) Oral once PRN Blood Glucose LESS THAN 70 milliGRAM(s)/deciliter  oxyCODONE    IR 5 milliGRAM(s) Oral daily PRN Severe Pain (7 - 10)  traMADol 50 milliGRAM(s) Oral every 6 hours PRN Mild Pain (1 - 3)    _________________________________________________________________    Physical Exam: Constitutional - NAD, Comfortable  HEENT - NCAT, EOMI  Neck - Supple, No limited ROM  Chest - good chest expansion, good respiratory effort, CTAB   Cardio - warm and well perfused  Abdomen -  Soft, NTND  Extremities - No peripheral edema, No calf tenderness. Aquacel dressing in place on the LLE.   Neurologic Exam:                    Cognitive -             Orientation: Awake, Alert, AAO to self, place, date, year, situation            Thought: process, content appropriate     Speech - Fluent, Comprehensible, No dysarthria, No aphasia      Cranial Nerves - No facial asymmetry, Tongue midline, EOMI, Shoulder shrug intact     Motor -                      LEFT    UE - ShAB 5/5, EF 5/5, EE 5/5, WE 5/5,  WNL                    RIGHT UE - ShAB 5/5, EF 5/5, EE 5/5, WE 5/5,  WNL                    LEFT    LE - HF 3/5, KE 3/5, DF 4/5, PF 4/5                    RIGHT LE - HF 5/5, KE 5/5, DF 5/5, PF 5/5        Sensory - Intact to LT bilateral     OculoVestibular -  No nystagmus  Psychiatric - Mood stable, Affect WNL  Skin on admission: Aquacel dressing in place on the LLE. otherwise the skin is warm dry & intact. HPI:  This is a 63yo Female with PMH of HTN, HLD, DM and OA who presents to [facility] in 5/22 for orthopedic surgery. Patient s/p left total knee arthroplasty with Dr. Glover on 5/22/2023. Patient tolerated the procedure well without any intraoperative complications. Patient tolerated physical therapy well, and the pain was controlled. Patient is weight bearing as tolerated with cane/walker as needed. Seen by medical attending for continuity of care and management and cleared for safe discharge. Any suture/staples to be removed on post-op day #14. Patient is on 325mg of ASA for DVT prophylaxis for total of 6 weeks.    Patient was evaluated by PM&R and therapy for functional deficits, gait/ADL impairments and acute rehabilitation was recommended. Patient was medically optimized for discharge to [facility] IRU on 5/24/23.  (24 May 2023 16:14)    TDD: 6/2 Home  _____________________________________________________________    SUBJECTIVE/ROS  Patient was seen and evaluated at bedside today - history obtained with the assistance of the patient's daughter via phone per patient's preferences   Reported no overnight events and is in no acute distress.  Patient reports that her pain is well-controlled - she remains on oxycodone once per day which she has been taking primarily in the evening  CBC today with stable Hg - will continue to trend routinely.  Patient and family are requesting discharge tonight instead of tomorrow AM due to logistics issues of picking her up tomorrow.  Team is actively organizing delivery of DME.  Patient is moving her bowels regularly without issue.  Motivated to participate on the recommended rehabilitation program.  Denies any CP, SOB, WALLACE, palpitations, fever, chills, body aches, cough, congestion, or any other symptoms at this time.     _______________________________________________________________    VITAL SIGNS:  Vital Signs Last 24 Hrs  T(C): 36.8 (31 May 2023 19:45), Max: 36.8 (31 May 2023 19:45)  T(F): 98.3 (31 May 2023 19:45), Max: 98.3 (31 May 2023 19:45)  HR: 55 (01 Jun 2023 05:07) (55 - 69)  BP: 149/64 (01 Jun 2023 05:07) (149/64 - 157/87)  RR: 15 (31 May 2023 19:45) (15 - 15)  SpO2: 98% (31 May 2023 19:45) (98% - 99%)    _______________________________________________________________  LABS:                        8.6    7.20  )-----------( 387      ( 01 Jun 2023 05:26 )             26.7     06-01    140  |  105  |  14  ----------------------------<  122<H>  3.9   |  30  |  0.62    Ca    8.4      01 Jun 2023 05:26    TPro  6.2  /  Alb  2.4<L>  /  TBili  0.3  /  DBili  x   /  AST  20  /  ALT  24  /  AlkPhos  68  06-01      _______________________________________________________________    MEDICATIONS  (STANDING):  acetaminophen     Tablet .. 975 milliGRAM(s) Oral every 12 hours  aspirin 325 milliGRAM(s) Oral two times a day  atorvastatin 10 milliGRAM(s) Oral at bedtime  celecoxib 200 milliGRAM(s) Oral two times a day  dextrose 5%. 1000 milliLiter(s) (50 mL/Hr) IV Continuous <Continuous>  dextrose 5%. 1000 milliLiter(s) (100 mL/Hr) IV Continuous <Continuous>  dextrose 50% Injectable 25 Gram(s) IV Push once  dextrose 50% Injectable 12.5 Gram(s) IV Push once  dextrose 50% Injectable 25 Gram(s) IV Push once  folic acid 1 milliGRAM(s) Oral daily  glucagon  Injectable 1 milliGRAM(s) IntraMuscular once  hydrochlorothiazide 12.5 milliGRAM(s) Oral daily  losartan 50 milliGRAM(s) Oral daily  multivitamin 1 Tablet(s) Oral daily  pantoprazole    Tablet 40 milliGRAM(s) Oral before breakfast  polyethylene glycol 3350 17 Gram(s) Oral at bedtime  senna 2 Tablet(s) Oral at bedtime    MEDICATIONS  (PRN):  bisacodyl Suppository 10 milliGRAM(s) Rectal daily PRN Constipation  dextrose Oral Gel 15 Gram(s) Oral once PRN Blood Glucose LESS THAN 70 milliGRAM(s)/deciliter  oxyCODONE    IR 5 milliGRAM(s) Oral daily PRN Severe Pain (7 - 10)  traMADol 50 milliGRAM(s) Oral every 6 hours PRN Mild Pain (1 - 3)    _________________________________________________________________    Physical Exam: Constitutional - NAD, Comfortable  HEENT - NCAT, EOMI  Neck - Supple, No limited ROM  Chest - good chest expansion, good respiratory effort, CTAB   Cardio - warm and well perfused  Abdomen -  Soft, NTND  Extremities - No peripheral edema, No calf tenderness. Aquacel dressing in place on the LLE.   Neurologic Exam:                    Cognitive -             Orientation: Awake, Alert, AAO to self, place, date, year, situation            Thought: process, content appropriate     Speech - Fluent, Comprehensible, No dysarthria, No aphasia      Cranial Nerves - No facial asymmetry, Tongue midline, EOMI, Shoulder shrug intact     Motor -                      LEFT    UE - ShAB 5/5, EF 5/5, EE 5/5, WE 5/5,  WNL                    RIGHT UE - ShAB 5/5, EF 5/5, EE 5/5, WE 5/5,  WNL                    LEFT    LE - HF 3/5, KE 3/5, DF 4/5, PF 4/5                    RIGHT LE - HF 5/5, KE 5/5, DF 5/5, PF 5/5        Sensory - Intact to LT bilateral     OculoVestibular -  No nystagmus  Psychiatric - Mood stable, Affect WNL  Skin:  skin is warm dry & intact.

## 2023-06-01 NOTE — PROGRESS NOTE ADULT - ASSESSMENT
This is a 61yo Female with PMH of HTN, HLD, DM and OA who presents to [facility] in 5/22 for orthopedic surgery. Patient s/p left total knee arthroplasty with Dr. Glover on 5/22/2023. Patient tolerated the procedure well without any intraoperative complications. Patient tolerated physical therapy well, and the pain was controlled. Patient is weight bearing as tolerated with cane/walker as needed. Seen by medical attending for continuity of care and management and cleared for safe discharge. Any suture/staples to be removed on post-op day #14. Patient is on 325mg of ASA for DVT prophylaxis for total of 6 weeks.    # Primary Osteoarthritis of Left Knee S/P Total Joint Replacement Arthroplasty  - ADL and mobility impairment  - Need for assistance with personal care  - Patient is weight bearing as tolerated with cane/walker as needed.   - 325mg of ASA BID for DVT prophylaxis, for 6 weeks.   - begin comprehensive rehab program OT, PT, SLP  3 hours daily 5 x week  - Precautions: fall.     #Obesity    #HTN  - Losartan 50 mg  - HCTZ 12.5mg  - monitor vitals, hospitalist in    #DM   -ISS   -Monitor glucose   -Take Trulicity at home 1x a week. Daughter states she can bring in it.     #HLD  - Atorvastatin 10 mg daily    #Pain management  - Celebrex 200 BID.   - Oxycodone 5mg daily PRN  - Tizanidne 2mg PRN    GI/Bowel:  - At risk for constipation due to neurologic diagnosis, immobility and/or medication use  - Senna QHS, Miralax Daily  - GI ppx: Protonix    /Bladder:   - At risk for incontinence and retention due to neurologic diagnosis and limited mobility  - Continue catheter/bladder nursing protocol with bladder scans per routine with straight cath for >400cc.  - Encourage timed voids every 4 hours while awake for independence and to promote continence during therapy.      Skin/Pressure Injury:   - At risk for pressure injury due to neurologic diagnosis and relative immobility.  - Skin assessment on admission: Aquacel dressing in place on the LLE  - Turn every 2 hours while in bed, air mattress  - Soft heel protectors  - Skin barrier cream as needed  - Nursing to monitor skin Qshift    #FEN   - Diet: Regular     #DVT ppx  -  BID      Outpatient Follow-up (Specialty/Name of physician):    Jose Antonio Glover)  Orthopedics  [address] [facility], [address] [place] [address]  Phone: [phone]  Fax: [phone]  Follow Up Time: 2 weeks    ******************************    IDT 5/30  TDD: 6/2 Home  SW- Lives with son and daughter in law in home with 5 steps to enter, 1 flight to bedroom ( 17 steps )   Independent at baseline  RN- Supervision B/B  OT- CG txf to commode and shower, Camron LBD due to pain; goals for Char all ADLs and transfers, Char ambulation with AD  PT- supervision BM, CG transfers, 150' RW supervision, 12 steps 1 HR CG/Sup    ****************************** This is a 63yo Female with PMH of HTN, HLD, DM and OA who presents to [facility] in 5/22 for orthopedic surgery. Patient s/p left total knee arthroplasty with Dr. Glover on 5/22/2023. Patient tolerated the procedure well without any intraoperative complications. Patient tolerated physical therapy well, and the pain was controlled. Patient is weight bearing as tolerated with cane/walker as needed. Seen by medical attending for continuity of care and management and cleared for safe discharge. Any suture/staples to be removed on post-op day #14. Patient is on 325mg of ASA for DVT prophylaxis for total of 6 weeks.    # Primary Osteoarthritis of Left Knee S/P Total Joint Replacement Arthroplasty  - ADL and mobility impairment  - Need for assistance with personal care  - Patient is weight bearing as tolerated with cane/walker as needed.   - 325mg of ASA BID for DVT prophylaxis, for 6 weeks.   - begin comprehensive rehab program OT, PT, SLP  3 hours daily 5 x week  - Precautions: fall.     #Obesity    #HTN  - Losartan 50 mg  - HCTZ 12.5mg  - monitor vitals, hospitalist in    #DM   -ISS   -Monitor glucose   -Take Trulicity at home 1x a week. Daughter states she can bring in it.     #HLD  - Atorvastatin 10 mg daily    #Pain management  - Celebrex 200 BID.   - Oxycodone 5mg daily PRN, tramadol moderate PRN  - Tizanidne 2mg PRN    GI/Bowel:  - At risk for constipation due to neurologic diagnosis, immobility and/or medication use  - Senna QHS, Miralax Daily  - GI ppx: Protonix    /Bladder:   - At risk for incontinence and retention due to neurologic diagnosis and limited mobility  - Continue catheter/bladder nursing protocol with bladder scans per routine with straight cath for >400cc.  - Encourage timed voids every 4 hours while awake for independence and to promote continence during therapy.      Skin/Pressure Injury:   - At risk for pressure injury due to neurologic diagnosis and relative immobility.  - Skin assessment on admission: Aquacel dressing in place on the LLE  - Turn every 2 hours while in bed, air mattress  - Soft heel protectors  - Skin barrier cream as needed  - Nursing to monitor skin Qshift    #FEN   - Diet: Regular     #DVT ppx  -  BID      Outpatient Follow-up (Specialty/Name of physician):    Jose Antonio Glover)  Orthopedics  [address] [facility], [address] [place] [address]  Phone: [phone]  Fax: [phone]  Follow Up Time: 2 weeks    ******************************    IDT 5/30  TDD: 6/2 Home - family requesting 6/1 d/c  SW- Lives with son and daughter in law in home with 5 steps to enter, 1 flight to bedroom ( 17 steps )   Independent at baseline  RN- Supervision B/B  OT- CG txf to commode and shower, Camron LBD due to pain; goals for Char all ADLs and transfers, Char ambulation with AD  PT- supervision BM, CG transfers, 150' RW supervision, 12 steps 1 HR CG/Sup    ****************************** This is a 61yo Female with PMH of HTN, HLD, DM and OA who presents to [facility] in 5/22 for orthopedic surgery. Patient s/p left total knee arthroplasty with Dr. Glover on 5/22/2023. Patient tolerated the procedure well without any intraoperative complications. Patient tolerated physical therapy well, and the pain was controlled. Patient is weight bearing as tolerated with cane/walker as needed. Seen by medical attending for continuity of care and management and cleared for safe discharge. Any suture/staples to be removed on post-op day #14. Patient is on 325mg of ASA for DVT prophylaxis for total of 6 weeks.    # Primary Osteoarthritis of Left Knee S/P Total Joint Replacement Arthroplasty  - ADL and mobility impairment  - Need for assistance with personal care  - Patient is weight bearing as tolerated with cane/walker as needed.   - 325mg of ASA BID for DVT prophylaxis, for 6 weeks.   - Completed comprehensive rehab program OT, PT, SLP  3 hours daily 5 x week  - Discharge to home with PT as outpatient  - Precautions: fall.     #Obesity    #HTN  - Losartan 50 mg  - HCTZ 12.5mg  - monitor vitals, hospitalist in    #DM   -ISS   -Monitor glucose   -Take Trulicity at home 1x a week. Daughter states she can bring in it.     #HLD  - Atorvastatin 10 mg daily    #Pain management  - Celebrex 200 BID.   - Oxycodone 5mg daily PRN, tramadol moderate PRN  - Tizanidne 2mg PRN    GI/Bowel:  - At risk for constipation due to neurologic diagnosis, immobility and/or medication use  - Senna QHS, Miralax Daily  - GI ppx: Protonix    /Bladder:   - At risk for incontinence and retention due to neurologic diagnosis and limited mobility  - Continue catheter/bladder nursing protocol with bladder scans per routine with straight cath for >400cc.  - Encourage timed voids every 4 hours while awake for independence and to promote continence during therapy.      Skin/Pressure Injury:   - At risk for pressure injury due to neurologic diagnosis and relative immobility.  - Skin assessment on admission: Aquacel dressing in place on the LLE  - Turn every 2 hours while in bed, air mattress  - Soft heel protectors  - Skin barrier cream as needed  - Nursing to monitor skin Qshift    #FEN   - Diet: Regular     #DVT ppx  -  BID      Outpatient Follow-up (Specialty/Name of physician):    Jose Antonio Glover)  Orthopedics  [address] [facility], [address] [place] [address]  Phone: [phone]  Fax: [phone]  Follow Up Time: 2 weeks    ******************************    IDT 5/30  TDD: 6/2 Home - family requesting 6/1 d/c  SW- Lives with son and daughter in law in home with 5 steps to enter, 1 flight to bedroom ( 17 steps )   Independent at baseline  RN- Supervision B/B  OT- CG txf to commode and shower, Camron LBD due to pain; goals for Char all ADLs and transfers, Char ambulation with AD  PT- supervision BM, CG transfers, 150' RW supervision, 12 steps 1 HR CG/Sup    ******************************

## 2023-06-01 NOTE — PROGRESS NOTE ADULT - ASSESSMENT
63yo Female with PMH of HTN, HLD, DM and OA, s/p elective left total knee arthroplasty with Dr. Glover on 5/22/2023.     # Left Knee OA s/p Left Total Knee Replacement Arthroplasty  -WBAT  - 325mg of ASA BID for DVT ppx x6 weeks. Take w/ PPI for stomach protection.  - Any suture/staples to be removed on post-op day #14 (6/5/2023).   - comprehensive rehab  - fall precautions    # acute post operative blood loss anemia on top of normocytic anemia  - per patient and daughter. pt has known  history of anemia with extensive outpatient work up.  - trend h/h    #HTN  - per daughter, home regimen - losartan-hctz 100-12.5   -continue current in hospital regimen, adjust as appropriate:  - Losartan 50 mg  - HCTZ 12.5mg  - will follow BP including OH    #HLD  - Atorvastatin 10 mg daily    #DM2  -A1c noted to be 5.6  -Blood sugars have been controlled  - off ISS and FS at this time  -Take Trulicity at home 1x a week - not approved for hospital use    # Constipation  - miralax  - senna and suppository.    DVT PPX: ASA BID    d/w rehab team at [facility]

## 2023-06-01 NOTE — DISCHARGE NOTE PROVIDER - NSDCMRMEDTOKEN_GEN_ALL_CORE_FT
acetaminophen 325 mg oral tablet: 3 tab(s) orally every 12 hours  Adult Aspirin 325 mg oral tablet: 1 tab(s) orally 2 times a day  atorvastatin 10 mg oral tablet: 1 orally once a day  CeleBREX 200 mg oral capsule: 1 cap(s) orally 2 times a day  folic acid 1 mg oral tablet: 1 tab(s) orally once a day  losartan-hydrochlorothiazide 50 mg-12.5 mg oral tablet: 1 orally once a day  Multiple Vitamins oral tablet: 1 tab(s) orally once a day  oxyCODONE 5 mg oral tablet: 1 tab(s) orally every 4 hours as needed for Moderate-Severe Pain  pantoprazole 40 mg oral delayed release tablet: 1 tab(s) orally once a day (before a meal)  polyethylene glycol 3350 oral powder for reconstitution: 17 gram(s) orally once a day (at bedtime) as needed for  constipation  senna leaf extract oral tablet: 2 tab(s) orally once a day (at bedtime) as needed for  constipation  traMADol 50 mg oral tablet: 1 tab(s) orally every 6 hours As needed Mild Pain (1 - 3)   acetaminophen 325 mg oral tablet: 3 tab(s) orally every 12 hours  Adult Aspirin 325 mg oral tablet: 1 tab(s) orally 2 times a day  atorvastatin 10 mg oral tablet: 1 orally once a day  CeleBREX 200 mg oral capsule: 1 cap(s) orally 2 times a day  folic acid 1 mg oral tablet: 1 tab(s) orally once a day  losartan-hydrochlorothiazide 50 mg-12.5 mg oral tablet: 1 orally once a day  Multiple Vitamins oral tablet: 1 tab(s) orally once a day  oxyCODONE 5 mg oral tablet: 1 tab(s) orally every 4 hours (5 times/day) as needed for Severe Pain (7 - 10) MDD: 25mg  pantoprazole 40 mg oral delayed release tablet: 1 tab(s) orally once a day (before a meal)  polyethylene glycol 3350 oral powder for reconstitution: 17 gram(s) orally once a day (at bedtime) as needed for  constipation  senna leaf extract oral tablet: 2 tab(s) orally once a day (at bedtime) as needed for  constipation  traMADol 50 mg oral tablet: 1 tab(s) orally every 6 hours as needed for Mild Pain (1 - 3) MDD: 200mg

## 2023-06-01 NOTE — DISCHARGE NOTE PROVIDER - NSDCCPCAREPLAN_GEN_ALL_CORE_FT
PRINCIPAL DISCHARGE DIAGNOSIS  Diagnosis: S/P TKR (total knee replacement)  Assessment and Plan of Treatment: You were admitted to [place] for rehabilitation following your surgery, and you received physical and occupational therapy during your admission. Please continue taking your medications as prescribed (including Aspirin for 6 weeks following surgery) and follow up with your Orthopedic surgeon, PM&R, and PCP following your discharge from rehab.      SECONDARY DISCHARGE DIAGNOSES  Diagnosis: Anemia due to acute blood loss  Assessment and Plan of Treatment: You experienced low blood hemoglobin levels due to blood loss from your surgery. Please continue taking your medications and suplements as prescribed and follow up with your PCP for rountine monitoring of bloodwork upon your discharge from rehab    Diagnosis: Diabetes  Assessment and Plan of Treatment: You have a history of diabetes and your blood sugars were monitored while you were in the hospital. Please continue taking your diabetes medications as prescribed and follow up with your PCP or endocrinologist following discharge from rehab.

## 2023-06-01 NOTE — PROGRESS NOTE ADULT - PROVIDER SPECIALTY LIST ADULT
Hospitalist
Physiatry
Rehab Medicine
Rehab Medicine
Hospitalist
Hospitalist
Physiatry
Rehab Medicine
Hospitalist
Hospitalist
Physiatry
Physiatry

## 2023-06-01 NOTE — DISCHARGE NOTE PROVIDER - CARE PROVIDER_API CALL
Jose Antonio Glover  Orthopaedic Surgery  [address] [place] [address], [address]  [place] [address]  Phone: [phone]  Fax: [phone]  Follow Up Time:

## 2023-06-01 NOTE — DISCHARGE NOTE PROVIDER - ATTENDING DISCHARGE PHYSICAL EXAMINATION:
Constitutional - NAD, Comfortable  HEENT - NCAT, EOMI  Neck - Supple, No limited ROM  Chest - good chest expansion, good respiratory effort, CTAB   Cardio - warm and well perfused  Abdomen -  Soft, NTND  Extremities - No peripheral edema, No calf tenderness. Aquacel dressing in place on the LLE.   Neurologic Exam:                    Cognitive -             Orientation: Awake, Alert, AAO to self, place, date, year, situation            Thought: process, content appropriate     Speech - Fluent, Comprehensible, No dysarthria, No aphasia      Cranial Nerves - No facial asymmetry, Tongue midline, EOMI, Shoulder shrug intact     Motor -                      LEFT    UE - ShAB 5/5, EF 5/5, EE 5/5, WE 5/5,  WNL                    RIGHT UE - ShAB 5/5, EF 5/5, EE 5/5, WE 5/5,  WNL                    LEFT    LE - HF 3/5, KE 3/5, DF 4/5, PF 4/5                    RIGHT LE - HF 5/5, KE 5/5, DF 5/5, PF 5/5        Sensory - Intact to LT bilateral     OculoVestibular -  No nystagmus  Psychiatric - Mood stable, Affect WNL  Skin: Skin is warm dry & intact.

## 2023-06-01 NOTE — PROGRESS NOTE ADULT - REASON FOR ADMISSION
Primary Osteoarthritis of Left Knee S/P Total Joint Replacement Arthroplasty

## 2023-06-01 NOTE — DISCHARGE NOTE PROVIDER - NSDCFUSCHEDAPPT_GEN_ALL_CORE_FT
Jose Antonio Glover  [facility] Physician [facility]  ORTHOSUR [address] [facility]  Scheduled Appointment: 06/06/2023

## 2023-06-01 NOTE — PROGRESS NOTE ADULT - SUBJECTIVE AND OBJECTIVE BOX
Medicine Progress Note    Patient is a 62y old  Female who presents with a chief complaint of Primary Osteoarthritis of Left Knee S/P Total Joint Replacement Arthroplasty (01 Jun 2023 08:11)      SUBJECTIVE / OVERNIGHT EVENTS:  seen and examined  Chart reviewed  No overnight events  Limb weakness improving with therapy  BM+  knee pain controlled with meds  No complaints    ADDITIONAL REVIEW OF SYSTEMS:  denied fever/chills/CP/SOB/cough/palpitation/dizziness/abdominal pian/nausea/vomiting/diarrhoea/constipation/dysuria/leg or calf pain/headaches.all other ROS neg    MEDICATIONS  (STANDING):  acetaminophen     Tablet .. 975 milliGRAM(s) Oral every 12 hours  aspirin 325 milliGRAM(s) Oral two times a day  atorvastatin 10 milliGRAM(s) Oral at bedtime  celecoxib 200 milliGRAM(s) Oral two times a day  dextrose 5%. 1000 milliLiter(s) (100 mL/Hr) IV Continuous <Continuous>  dextrose 5%. 1000 milliLiter(s) (50 mL/Hr) IV Continuous <Continuous>  dextrose 50% Injectable 25 Gram(s) IV Push once  dextrose 50% Injectable 12.5 Gram(s) IV Push once  dextrose 50% Injectable 25 Gram(s) IV Push once  folic acid 1 milliGRAM(s) Oral daily  glucagon  Injectable 1 milliGRAM(s) IntraMuscular once  hydrochlorothiazide 12.5 milliGRAM(s) Oral daily  losartan 50 milliGRAM(s) Oral daily  multivitamin 1 Tablet(s) Oral daily  pantoprazole    Tablet 40 milliGRAM(s) Oral before breakfast  polyethylene glycol 3350 17 Gram(s) Oral at bedtime  senna 2 Tablet(s) Oral at bedtime    MEDICATIONS  (PRN):  bisacodyl Suppository 10 milliGRAM(s) Rectal daily PRN Constipation  dextrose Oral Gel 15 Gram(s) Oral once PRN Blood Glucose LESS THAN 70 milliGRAM(s)/deciliter  oxyCODONE    IR 5 milliGRAM(s) Oral daily PRN Severe Pain (7 - 10)  traMADol 50 milliGRAM(s) Oral every 6 hours PRN Mild Pain (1 - 3)    CAPILLARY BLOOD GLUCOSE        I&O's Summary      PHYSICAL EXAM:  Vital Signs Last 24 Hrs  T(C): 36.6 (01 Jun 2023 08:33), Max: 36.8 (31 May 2023 19:45)  T(F): 97.8 (01 Jun 2023 08:33), Max: 98.3 (31 May 2023 19:45)  HR: 67 (01 Jun 2023 08:33) (55 - 67)  BP: 155/82 (01 Jun 2023 08:33) (149/64 - 157/87)  BP(mean): --  RR: 16 (01 Jun 2023 08:33) (15 - 16)  SpO2: 100% (01 Jun 2023 08:33) (98% - 100%)    Parameters below as of 01 Jun 2023 08:33  Patient On (Oxygen Delivery Method): room air    GENERAL: Not in distress. Alert    HEENT: clear conjuctiva, MMM. no pallor or icterus  CARDIOVASCULAR: RRR S1, S2. No murmur/rubs/gallop  LUNGS: BLAE+, no rales, no wheezing, no rhonchi.    ABDOMEN: ND. Soft,  NT, no guarding / rebound / rigidity. BS normoactive  BACK: No spine tenderness.  EXTREMITIES: no edema. no leg or calf TP.  SKIN: warm and dry  PSYCHIATRIC: Calm.  No agitation.  CNS: AAO*3. moving limbs. follows commands    LABS:                        8.6    7.20  )-----------( 387      ( 01 Jun 2023 05:26 )             26.7     06-01    140  |  105  |  14  ----------------------------<  122<H>  3.9   |  30  |  0.62    Ca    8.4      01 Jun 2023 05:26    TPro  6.2  /  Alb  2.4<L>  /  TBili  0.3  /  DBili  x   /  AST  20  /  ALT  24  /  AlkPhos  68  06-01              COVID-19 PCR: NotDetec (23 May 2023 12:32)      RADIOLOGY & ADDITIONAL TESTS:  Imaging from Last 24 Hours:    Electrocardiogram/QTc Interval:    COORDINATION OF CARE:  Care Discussed with Consultants/Other Providers:

## 2023-06-01 NOTE — DISCHARGE NOTE NURSING/CASE MANAGEMENT/SOCIAL WORK - PATIENT PORTAL LINK FT
You can access the FollowMyHealth Patient Portal offered by [facility] by registering at the following website: [email or web address]. By joining [facility]’s FollowMyHealth portal, you will also be able to view your health information using other applications (apps) compatible with our system.

## 2023-06-01 NOTE — DISCHARGE NOTE PROVIDER - HOSPITAL COURSE
This is a 63yo Female with PMH of HTN, HLD, DM and OA who presents to [facility] in 5/22 for orthopedic surgery. Patient s/p left total knee arthroplasty with Dr. Glover on 5/22/2023. Patient tolerated the procedure well without any intraoperative complications. Patient tolerated physical therapy well, and the pain was controlled. Patient is weight bearing as tolerated with cane/walker as needed. Seen by medical attending for continuity of care and management and cleared for safe discharge. Any suture/staples to be removed on post-op day #14. Patient is on 325mg of ASA for DVT prophylaxis for total of 6 weeks.    Patient was evaluated by PM&R and therapy for functional deficits, gait/ADL impairments and acute rehabilitation was recommended. Patient was medically optimized for discharge to [facility] IRU on 5/24/23.     REHAB COURSE:  Patient was stable upon rehab admission to  Inpatient Rehabilitation Facility. Admitted with gait instability, ADL, and functional impairments.     Rehab Course was relatively uncomplicated. Patient's pain was well-controlled with sparing use of oxycodone.  Post-operative anemia stabilized during rehab course; Hgb 8.6 on 6/1.  Patient was maintained on ASA 325mg BID for DVT ppx with plan for total 6 weeks per ortho.    Patient tolerated course of inpatient PT/OT/SLP rehab with significant functional improvements and met rehab goals prior to discharge. Patient was medically stable and optimized for discharge home with follow-up with Orthopedics, PM&R and PCP.

## 2023-06-01 NOTE — DISCHARGE NOTE NURSING/CASE MANAGEMENT/SOCIAL WORK - NSDCPEFALRISK_GEN_ALL_CORE
For information on Fall & Injury Prevention, visit: [email or web address] OR  [email or web address] OR  https://www.cdc.gov/steadi/patient.html

## 2023-06-06 ENCOUNTER — APPOINTMENT (OUTPATIENT)
Dept: ORTHOPEDIC SURGERY | Facility: CLINIC | Age: 63
End: 2023-06-06
Payer: COMMERCIAL

## 2023-06-06 PROCEDURE — 99024 POSTOP FOLLOW-UP VISIT: CPT

## 2023-06-06 PROCEDURE — 73562 X-RAY EXAM OF KNEE 3: CPT | Mod: LT

## 2023-06-06 RX ORDER — TRAMADOL HYDROCHLORIDE 50 MG/1
50 TABLET, COATED ORAL TWICE DAILY
Qty: 10 | Refills: 0 | Status: ACTIVE | COMMUNITY
Start: 2023-06-06 | End: 1900-01-01

## 2023-06-06 RX ORDER — CELECOXIB 200 MG/1
200 CAPSULE ORAL DAILY
Qty: 14 | Refills: 1 | Status: ACTIVE | COMMUNITY
Start: 2023-06-06 | End: 1900-01-01

## 2023-06-11 NOTE — PHYSICAL EXAM
[de-identified] : Constitutional: 62 year old female, alert and oriented, cooperative, in no acute distress.\par \par HEENT \par NC/AT.  Appearance: symmetric\par \par Neck/Back\par Straight without deformity or instability.  Good ROM.\par \par Chest/Respiratory \par Respiratory effort: no intercostal retractions or use of accessory muscles. Nonlabored Breathing\par \par Skin \par On inspection, warm and dry without rashes or lesions.\par \par Mental Status: \par Judgment, insight: intact\par Orientation: oriented to time, place, and person\par \par Neurological:\par Sensory and Motor are grossly intact throughout\par \par Left Knee\par \par Inspection:\par     Incision well healing. No erythema or drainage\par     Minimal swelling\par     Non-tender to palpation over tibial tubercle, patella, medial and lateral joint line, and pes insertion.\par \par Range of Motion:\par 	Extension - 0 degrees\par 	Flexion - 105 degrees\par 	Alignment - Valgus 3 degrees\par 	Extensor lag: None\par \par Stability:\par      Demonstrates no Varus or Valgus instability\par      Negative Anterior or Posterior drawer.\par      No mid flexion instability\par \par Patella: stable, tracks well. \par \par Neurologic Exam\par     Motor intact including 5/5 Extensor Hallucis Longus, 5/5 Flexor Hallucis Longus, 5/5 Tibialis Anterior and 5/5 Gastrocnemius\par     Sensation Intact to Light Touch including Saphenous, Sural, Superficial Peroneal, Deep Peroneal, Tibial nerve distributions\par \par Vascular Exam\par     Foot is warm and well perfused with 2+ Dorsalis Pedis Pulse  [de-identified] : XRay:  XRays of the Left Knee (3 Views) taken in the office today and reviewed with the patient. XRays demonstrate a Left Total Knee Arthroplasty in good position and alignment. There is no obvious evidence of fracture, dislocation, osteolysis or loosening. (my personal interpretation)\par Components: Marianne [employer] PS Cemented

## 2023-06-11 NOTE — HISTORY OF PRESENT ILLNESS
[de-identified] : 6/6/2023\par Translated by Daughter in Law\par \par Gurpreet Spencer is a 62-year-old female presenting to the office for follow-up of her left total knee arthroplasty.  Patient underwent left TKA on 5/22/2023.  Patient is currently doing well.  She was discharged to rehab, but has since been discharged home.  Patient was doing physical therapy for 3 hours/day while in rehab and doing well.  She has not yet started physical therapy as an outpatient.  She is currently walking with a walker.  Patient's pain is currently well controlled with pain medications.  She is currently taking her Aspirin 325 mg twice per day for DVT prophylaxis.\par \par 5/19/2023\par Translated by Daughter in law\par Ambreen  ID: [id number]\par \par Ms. Spencer is a 62-year-old female presents to the office for evaluation of her bilateral knee pain.  Patient has been experiencing bilateral (left greater than right) knee pain for many years (about 10 years), but has worsened over the last 2 years.  Pain is worse when getting up from a seated position.  Patient can walk about 1 block before experiencing pain and she has difficulty walking.  The pain is now affecting her quality of life.  She has tried 3-4 corticosteroid injections in her bilateral knees, which lasted about 3 months.  Her last injection was on 1/4/2023, which lasted about 2 to 3 months.  She has tried physical therapy and home exercises, with some relief, but not significant.  She has tried Celebrex, Tylenol, and Motrin, with some relief, but now medications are not significantly helping.  No hip or groin pain.  No back pain.  Patient does have some coccygeal pain that is worse with sitting.\par \par History: HTN, Diabetes (Last A1C: 5.7-5.9), HLD

## 2023-06-11 NOTE — DISCUSSION/SUMMARY
[de-identified] : Gurpreet Spencer is a 62-year-old female who presented to the office for follow-up of her left total knee arthroplasty.  Patient underwent left TKA on 5/22/2023.  She is currently doing well at home.  X-rays showed left total knee arthroplasty in good position and alignment.  Examination showed good left knee range of motion 0 to 105 degrees.  Discussed with patient the examination and imaging findings.  Discussed with patient and her daughter-in-law the recovery from total knee arthroplasty, including physical therapy, pain control, and DVT prophylaxis.  Patient was given referral to physical therapy.  Patient would like to continue her pain regimen with Celebrex and tramadol.  Patient was given a prescription for Celebrex 200 mg daily for 14 days.  She was given a prescription for tramadol 50 mg twice per day as needed for 5 days.  Patient will continue to take her Aspirin 325mg twice per day for total of 6 weeks for DVT prophylaxis.  Discussed that patient may shower, but should not soak the wound.  Patient will follow-up in 4 weeks for reevaluation and management.  Patient understanding and in agreement with the plan.  All questions answered.\par \par Plan:\par -Physical therapy\par -Celebrex 200 mg daily for 14 days\par -Tramadol 50 mg twice per day as needed for 5 days\par -DVT Prophylaxis: Aspirin 325 mg twice per day for total of 6 weeks\par -Patient may shower, but should not soak the wound\par -Follow-up in 4 weeks for reevaluation and management

## 2023-06-22 ENCOUNTER — APPOINTMENT (OUTPATIENT)
Dept: ORTHOPEDIC SURGERY | Facility: CLINIC | Age: 63
End: 2023-06-22
Payer: MEDICAID

## 2023-06-22 PROCEDURE — 73562 X-RAY EXAM OF KNEE 3: CPT | Mod: LT

## 2023-06-22 PROCEDURE — 99024 POSTOP FOLLOW-UP VISIT: CPT

## 2023-06-22 RX ORDER — CELECOXIB 200 MG/1
200 CAPSULE ORAL DAILY
Qty: 14 | Refills: 1 | Status: ACTIVE | COMMUNITY
Start: 2023-06-22 | End: 1900-01-01

## 2023-07-04 NOTE — PHYSICAL EXAM
[de-identified] : Constitutional: 62 year old female, alert and oriented, cooperative, in no acute distress.\par \par HEENT \par NC/AT.  Appearance: symmetric\par \par Neck/Back\par Straight without deformity or instability.  Good ROM.\par \par Chest/Respiratory \par Respiratory effort: no intercostal retractions or use of accessory muscles. Nonlabored Breathing\par \par Skin \par On inspection, warm and dry without rashes or lesions.\par \par Mental Status: \par Judgment, insight: intact\par Orientation: oriented to time, place, and person\par \par Neurological:\par Sensory and Motor are grossly intact throughout\par \par Left Knee\par \par Inspection:\par     Incision well healing. No erythema or drainage\par     No effusion\par     Non-tender to palpation over tibial tubercle, patella, medial and lateral joint line, and pes insertion.\par \par Range of Motion:\par 	Extension - 0 degrees\par 	Flexion - 120 degrees\par 	Alignment - Valgus 3 degrees\par 	Extensor lag: None\par \par Stability:\par      Demonstrates no Varus or Valgus instability\par      Negative Anterior or Posterior drawer.\par      No mid flexion instability\par \par Patella: stable, tracks well. \par \par Neurologic Exam\par     Motor intact including 5/5 Extensor Hallucis Longus, 5/5 Flexor Hallucis Longus, 5/5 Tibialis Anterior and 5/5 Gastrocnemius\par     Sensation Intact to Light Touch including Saphenous, Sural, Superficial Peroneal, Deep Peroneal, Tibial nerve distributions\par \par Vascular Exam\par     Foot is warm and well perfused with 2+ Dorsalis Pedis Pulse  [de-identified] : XRay:  XRays of the Left Knee (3 Views) taken in the office today and reviewed with the patient. XRays demonstrate a Left Total Knee Arthroplasty in good position and alignment. There is no obvious evidence of fracture, dislocation, osteolysis or loosening. (my personal interpretation)\par Components: Marianne Triathlon PS Cemented

## 2023-07-04 NOTE — HISTORY OF PRESENT ILLNESS
[de-identified] : 6/22/2023\par Translated by Daughter in Law\par \par Gurpreet Spencer presents to the office for follow-up of her left total knee arthroplasty.  Patient underwent left TKA on 5/22/2023.  She is currently doing well.  Patient does not typically have any knee pain, but has some pain after physical therapy.  She is currently using a cane.  She uses tramadol after physical therapy.  No falls or injuries.  No fevers or chills.  She finished her aspirin for her DVT prophylaxis.\par \par 6/6/2023\par Translated by Daughter in Law\par \par Gurpreet Spencer is a 62-year-old female presenting to the office for follow-up of her left total knee arthroplasty.  Patient underwent left TKA on 5/22/2023.  Patient is currently doing well.  She was discharged to rehab, but has since been discharged home.  Patient was doing physical therapy for 3 hours/day while in rehab and doing well.  She has not yet started physical therapy as an outpatient.  She is currently walking with a walker.  Patient's pain is currently well controlled with pain medications.  She is currently taking her Aspirin 325 mg twice per day for DVT prophylaxis.\par \par 5/19/2023\par Translated by Daughter in law\par Ambreen  ID: [id number]\par \par Ms. Spencer is a 62-year-old female presents to the office for evaluation of her bilateral knee pain.  Patient has been experiencing bilateral (left greater than right) knee pain for many years (about 10 years), but has worsened over the last 2 years.  Pain is worse when getting up from a seated position.  Patient can walk about 1 block before experiencing pain and she has difficulty walking.  The pain is now affecting her quality of life.  She has tried 3-4 corticosteroid injections in her bilateral knees, which lasted about 3 months.  Her last injection was on 1/4/2023, which lasted about 2 to 3 months.  She has tried physical therapy and home exercises, with some relief, but not significant.  She has tried Celebrex, Tylenol, and Motrin, with some relief, but now medications are not significantly helping.  No hip or groin pain.  No back pain.  Patient does have some coccygeal pain that is worse with sitting.\par \par History: HTN, Diabetes (Last A1C: 5.7-5.9), HLD

## 2023-07-04 NOTE — REVIEW OF SYSTEMS
[Negative] : Endocrine [Joint Pain] : no joint pain [Joint Stiffness] : no joint stiffness [Joint Swelling] : no joint swelling [Chills] : no chills [Fever] : no fever

## 2023-07-04 NOTE — DISCUSSION/SUMMARY
[de-identified] : Gurpreet Spencer is a 62-year-old female who presented to the office for follow-up of her left total knee arthroplasty.  Patient underwent left TKA on 5/22/2023.  She is currently doing well at home.  X-rays showed left total knee arthroplasty in good position and alignment.  Examination showed good left knee range of motion 0 to 120 degrees.  Discussed with patient the examination and imaging findings.  Discussed with patient and her daughter-in-law the recovery from total knee arthroplasty, including physical therapy, pain control, and DVT prophylaxis.  Patient was given referral to physical therapy.  Patient would like to continue her pain regimen with Celebrex. Patient was given a prescription for Celebrex 200 mg daily for 14 days.  Patient has completed her aspirin for her DVT prophylaxis. Patient will follow-up in 6 weeks for reevaluation and management.  Patient understanding and in agreement with the plan.  All questions answered.\par \par Plan:\par -Physical therapy\par -Celebrex 200 mg daily for 14 days\par -Completed DVT Prophylaxis\par -Follow-up in 6 weeks for reevaluation and management

## 2023-08-01 ENCOUNTER — APPOINTMENT (OUTPATIENT)
Dept: ORTHOPEDIC SURGERY | Facility: CLINIC | Age: 63
End: 2023-08-01
Payer: MEDICAID

## 2023-08-01 PROCEDURE — 73562 X-RAY EXAM OF KNEE 3: CPT | Mod: LT

## 2023-08-01 PROCEDURE — 99024 POSTOP FOLLOW-UP VISIT: CPT

## 2023-08-10 RX ORDER — CELECOXIB 200 MG/1
200 CAPSULE ORAL DAILY
Qty: 14 | Refills: 0 | Status: ACTIVE | COMMUNITY
Start: 2023-08-10 | End: 1900-01-01

## 2023-08-21 NOTE — DISCUSSION/SUMMARY
[de-identified] : Gurpreet Spencer is a 62-year-old female who presented to the office for follow-up of her left total knee arthroplasty.  Patient underwent left TKA on 5/22/2023.  She is currently doing well at home.  X-rays showed left total knee arthroplasty in good position and alignment.  Examination showed good left knee range of motion 0 to 120 degrees.  Discussed with patient the examination and imaging findings.  Discussed with patient and her daughter-in-law the recovery from total knee arthroplasty, including physical therapy, pain control, and DVT prophylaxis.  Patient will continue her physical therapy.  Patient has completed her aspirin for her DVT prophylaxis. Patient will follow-up in 6 weeks for reevaluation and management.  Patient understanding and in agreement with the plan.  All questions answered.  Plan: -Physical therapy -DVT Prophylaxis: Completed -Follow-up in 6 weeks for reevaluation and management

## 2023-08-21 NOTE — PHYSICAL EXAM
[de-identified] : Constitutional: 62 year old female, alert and oriented, cooperative, in no acute distress.  HEENT  NC/AT.  Appearance: symmetric  Neck/Back Straight without deformity or instability.  Good ROM.  Chest/Respiratory  Respiratory effort: no intercostal retractions or use of accessory muscles. Nonlabored Breathing  Skin  On inspection, warm and dry without rashes or lesions.  Mental Status:  Judgment, insight: intact Orientation: oriented to time, place, and person  Neurological: Sensory and Motor are grossly intact throughout  Left Knee  Inspection:     Incision well healing. No erythema or drainage     No effusion     Non-tender to palpation over tibial tubercle, patella, medial and lateral joint line, and pes insertion.  Range of Motion: 	Extension - 0 degrees 	Flexion - 120 degrees 	Alignment - Valgus 3 degrees 	Extensor lag: None  Stability:      Demonstrates no Varus or Valgus instability      Negative Anterior or Posterior drawer.      No mid flexion instability  Patella: stable, tracks well.   Neurologic Exam     Motor intact including 5/5 Extensor Hallucis Longus, 5/5 Flexor Hallucis Longus, 5/5 Tibialis Anterior and 5/5 Gastrocnemius     Sensation Intact to Light Touch including Saphenous, Sural, Superficial Peroneal, Deep Peroneal, Tibial nerve distributions  Vascular Exam     Foot is warm and well perfused with 2+ Dorsalis Pedis Pulse  [de-identified] : XRay:  XRays of the Left Knee (3 Views) taken in the office today and reviewed with the patient. XRays demonstrate a Left Total Knee Arthroplasty in good position and alignment. There is no obvious evidence of fracture, dislocation, osteolysis or loosening. (my personal interpretation)\par  Components: Marianne Triathlon PS Cemented

## 2023-08-21 NOTE — REVIEW OF SYSTEMS
[Joint Pain] : no joint pain [Joint Stiffness] : no joint stiffness [Joint Swelling] : no joint swelling [Fever] : no fever [Chills] : no chills [Negative] : Endocrine

## 2023-08-21 NOTE — HISTORY OF PRESENT ILLNESS
[de-identified] : 8/1/2023 Translated by Daughter in Law Gurpreet Spencer presents to the office for follow-up of her left total knee arthroplasty.  Patient underwent left TKA on 5/22/2023.  She is currently doing well. Patient's pain is currently well controlled. She is walking well without assistive devices.  No falls or injuries.  No fevers or chills.  She finished her aspirin for her DVT prophylaxis.   6/22/2023 Translated by Daughter in Law Gurpreet Spencer presents to the office for follow-up of her left total knee arthroplasty.  Patient underwent left TKA on 5/22/2023.  She is currently doing well.  Patient does not typically have any knee pain, but has some pain after physical therapy.  She is currently using a cane.  She uses tramadol after physical therapy.  No falls or injuries.  No fevers or chills.  She finished her aspirin for her DVT prophylaxis.  6/6/2023 Translated by Daughter in Law Gurpreet Spencer is a 62-year-old female presenting to the office for follow-up of her left total knee arthroplasty.  Patient underwent left TKA on 5/22/2023.  Patient is currently doing well.  She was discharged to rehab, but has since been discharged home.  Patient was doing physical therapy for 3 hours/day while in rehab and doing well.  She has not yet started physical therapy as an outpatient.  She is currently walking with a walker.  Patient's pain is currently well controlled with pain medications.  She is currently taking her Aspirin 325 mg twice per day for DVT prophylaxis.  5/19/2023 Translated by Daughter in  Ambreen  ID: [id number]  Ms. Spencer is a 62-year-old female presents to the office for evaluation of her bilateral knee pain.  Patient has been experiencing bilateral (left greater than right) knee pain for many years (about 10 years), but has worsened over the last 2 years.  Pain is worse when getting up from a seated position.  Patient can walk about 1 block before experiencing pain and she has difficulty walking.  The pain is now affecting her quality of life.  She has tried 3-4 corticosteroid injections in her bilateral knees, which lasted about 3 months.  Her last injection was on 1/4/2023, which lasted about 2 to 3 months.  She has tried physical therapy and home exercises, with some relief, but not significant.  She has tried Celebrex, Tylenol, and Motrin, with some relief, but now medications are not significantly helping.  No hip or groin pain.  No back pain.  Patient does have some coccygeal pain that is worse with sitting.  History: HTN, Diabetes (Last A1C: 5.7-5.9), HLD

## 2023-08-31 ENCOUNTER — APPOINTMENT (OUTPATIENT)
Dept: ORTHOPEDIC SURGERY | Facility: CLINIC | Age: 63
End: 2023-08-31

## 2023-09-14 ENCOUNTER — NON-APPOINTMENT (OUTPATIENT)
Age: 63
End: 2023-09-14

## 2023-09-18 PROBLEM — M17.11 ARTHRITIS OF KNEE, RIGHT: Status: ACTIVE | Noted: 2023-01-04

## 2023-09-19 ENCOUNTER — APPOINTMENT (OUTPATIENT)
Dept: ORTHOPEDIC SURGERY | Facility: CLINIC | Age: 63
End: 2023-09-19
Payer: MEDICAID

## 2023-09-19 DIAGNOSIS — M17.11 UNILATERAL PRIMARY OSTEOARTHRITIS, RIGHT KNEE: ICD-10-CM

## 2023-09-19 PROCEDURE — 99214 OFFICE O/P EST MOD 30 MIN: CPT

## 2023-09-19 PROCEDURE — 73564 X-RAY EXAM KNEE 4 OR MORE: CPT | Mod: 50

## 2023-09-22 ENCOUNTER — OUTPATIENT (OUTPATIENT)
Dept: OUTPATIENT SERVICES | Facility: HOSPITAL | Age: 63
LOS: 1 days | End: 2023-09-22

## 2023-09-22 VITALS
HEART RATE: 65 BPM | HEIGHT: 63 IN | DIASTOLIC BLOOD PRESSURE: 82 MMHG | RESPIRATION RATE: 18 BRPM | OXYGEN SATURATION: 96 % | WEIGHT: 190.04 LBS | SYSTOLIC BLOOD PRESSURE: 145 MMHG | TEMPERATURE: 97 F

## 2023-09-22 DIAGNOSIS — Z98.890 OTHER SPECIFIED POSTPROCEDURAL STATES: Chronic | ICD-10-CM

## 2023-09-22 DIAGNOSIS — Z90.710 ACQUIRED ABSENCE OF BOTH CERVIX AND UTERUS: Chronic | ICD-10-CM

## 2023-09-22 DIAGNOSIS — M17.11 UNILATERAL PRIMARY OSTEOARTHRITIS, RIGHT KNEE: ICD-10-CM

## 2023-09-22 DIAGNOSIS — I10 ESSENTIAL (PRIMARY) HYPERTENSION: ICD-10-CM

## 2023-09-22 DIAGNOSIS — Z96.652 PRESENCE OF LEFT ARTIFICIAL KNEE JOINT: Chronic | ICD-10-CM

## 2023-09-22 DIAGNOSIS — Z90.49 ACQUIRED ABSENCE OF OTHER SPECIFIED PARTS OF DIGESTIVE TRACT: Chronic | ICD-10-CM

## 2023-09-22 DIAGNOSIS — E11.9 TYPE 2 DIABETES MELLITUS WITHOUT COMPLICATIONS: ICD-10-CM

## 2023-09-22 DIAGNOSIS — Z98.49 CATARACT EXTRACTION STATUS, UNSPECIFIED EYE: Chronic | ICD-10-CM

## 2023-09-22 DIAGNOSIS — M19.90 UNSPECIFIED OSTEOARTHRITIS, UNSPECIFIED SITE: ICD-10-CM

## 2023-09-22 LAB
A1C WITH ESTIMATED AVERAGE GLUCOSE RESULT: 5.5 % — SIGNIFICANT CHANGE UP (ref 4–5.6)
ANION GAP SERPL CALC-SCNC: 13 MMOL/L — SIGNIFICANT CHANGE UP (ref 7–14)
APPEARANCE UR: CLEAR — SIGNIFICANT CHANGE UP
BILIRUB UR-MCNC: NEGATIVE — SIGNIFICANT CHANGE UP
BLD GP AB SCN SERPL QL: NEGATIVE — SIGNIFICANT CHANGE UP
BUN SERPL-MCNC: 10 MG/DL — SIGNIFICANT CHANGE UP (ref 7–23)
CALCIUM SERPL-MCNC: 9.4 MG/DL — SIGNIFICANT CHANGE UP (ref 8.4–10.5)
CHLORIDE SERPL-SCNC: 98 MMOL/L — SIGNIFICANT CHANGE UP (ref 98–107)
CO2 SERPL-SCNC: 27 MMOL/L — SIGNIFICANT CHANGE UP (ref 22–31)
COLOR SPEC: YELLOW — SIGNIFICANT CHANGE UP
CREAT SERPL-MCNC: 0.87 MG/DL — SIGNIFICANT CHANGE UP (ref 0.5–1.3)
DIFF PNL FLD: NEGATIVE — SIGNIFICANT CHANGE UP
EGFR: 74 ML/MIN/1.73M2 — SIGNIFICANT CHANGE UP
ESTIMATED AVERAGE GLUCOSE: 111 — SIGNIFICANT CHANGE UP
GLUCOSE SERPL-MCNC: 88 MG/DL — SIGNIFICANT CHANGE UP (ref 70–99)
GLUCOSE UR QL: NEGATIVE MG/DL — SIGNIFICANT CHANGE UP
HCT VFR BLD CALC: 33.9 % — LOW (ref 34.5–45)
HGB BLD-MCNC: 11 G/DL — LOW (ref 11.5–15.5)
KETONES UR-MCNC: NEGATIVE MG/DL — SIGNIFICANT CHANGE UP
LEUKOCYTE ESTERASE UR-ACNC: NEGATIVE — SIGNIFICANT CHANGE UP
MCHC RBC-ENTMCNC: 27.4 PG — SIGNIFICANT CHANGE UP (ref 27–34)
MCHC RBC-ENTMCNC: 32.4 GM/DL — SIGNIFICANT CHANGE UP (ref 32–36)
MCV RBC AUTO: 84.5 FL — SIGNIFICANT CHANGE UP (ref 80–100)
MRSA PCR RESULT.: SIGNIFICANT CHANGE UP
NITRITE UR-MCNC: NEGATIVE — SIGNIFICANT CHANGE UP
NRBC # BLD: 0 /100 WBCS — SIGNIFICANT CHANGE UP (ref 0–0)
NRBC # FLD: 0 K/UL — SIGNIFICANT CHANGE UP (ref 0–0)
PH UR: 7.5 — SIGNIFICANT CHANGE UP (ref 5–8)
PLATELET # BLD AUTO: 164 K/UL — SIGNIFICANT CHANGE UP (ref 150–400)
POTASSIUM SERPL-MCNC: 3.9 MMOL/L — SIGNIFICANT CHANGE UP (ref 3.5–5.3)
POTASSIUM SERPL-SCNC: 3.9 MMOL/L — SIGNIFICANT CHANGE UP (ref 3.5–5.3)
PROT UR-MCNC: NEGATIVE MG/DL — SIGNIFICANT CHANGE UP
RBC # BLD: 4.01 M/UL — SIGNIFICANT CHANGE UP (ref 3.8–5.2)
RBC # FLD: 13.5 % — SIGNIFICANT CHANGE UP (ref 10.3–14.5)
RH IG SCN BLD-IMP: POSITIVE — SIGNIFICANT CHANGE UP
S AUREUS DNA NOSE QL NAA+PROBE: SIGNIFICANT CHANGE UP
SODIUM SERPL-SCNC: 138 MMOL/L — SIGNIFICANT CHANGE UP (ref 135–145)
SP GR SPEC: 1.01 — SIGNIFICANT CHANGE UP (ref 1–1.03)
UROBILINOGEN FLD QL: 0.2 MG/DL — SIGNIFICANT CHANGE UP (ref 0.2–1)
WBC # BLD: 6.68 K/UL — SIGNIFICANT CHANGE UP (ref 3.8–10.5)
WBC # FLD AUTO: 6.68 K/UL — SIGNIFICANT CHANGE UP (ref 3.8–10.5)

## 2023-09-22 RX ORDER — CHLORHEXIDINE GLUCONATE 213 G/1000ML
1 SOLUTION TOPICAL DAILY
Refills: 0 | Status: DISCONTINUED | OUTPATIENT
Start: 2023-09-27 | End: 2023-10-03

## 2023-09-22 RX ORDER — SODIUM CHLORIDE 9 MG/ML
1000 INJECTION, SOLUTION INTRAVENOUS
Refills: 0 | Status: DISCONTINUED | OUTPATIENT
Start: 2023-09-27 | End: 2023-10-03

## 2023-09-22 RX ORDER — TRAMADOL HYDROCHLORIDE 50 MG/1
50 TABLET ORAL ONCE
Refills: 0 | Status: DISCONTINUED | OUTPATIENT
Start: 2023-09-27 | End: 2023-09-27

## 2023-09-22 RX ORDER — ATORVASTATIN CALCIUM 80 MG/1
1 TABLET, FILM COATED ORAL
Refills: 0 | DISCHARGE

## 2023-09-22 RX ORDER — LOSARTAN/HYDROCHLOROTHIAZIDE 100MG-25MG
1 TABLET ORAL
Refills: 0 | DISCHARGE

## 2023-09-22 RX ORDER — PANTOPRAZOLE SODIUM 20 MG/1
40 TABLET, DELAYED RELEASE ORAL
Refills: 0 | Status: DISCONTINUED | OUTPATIENT
Start: 2023-09-27 | End: 2023-10-02

## 2023-09-22 NOTE — H&P PST ADULT - ASSESSMENT
64 y/o F with H/O: HTN, DM2, HLD, Obesity scheduled for right total knee arthroplasty with Dr. Guzman.

## 2023-09-22 NOTE — H&P PST ADULT - NSICDXPASTSURGICALHX_GEN_ALL_CORE_FT
PAST SURGICAL HISTORY:  H/O cataract extraction     H/O total knee replacement, left     History of cholecystectomy     Status post excision of lipoma     Status post total abdominal hysterectomy and bilateral salpingo-oophorectomy (REGGIE-BSO)

## 2023-09-22 NOTE — H&P PST ADULT - MUSCULOSKELETAL
details… bilateral knees/decreased ROM due to pain/joint swelling/abnormal gait right knee/decreased ROM due to pain/joint swelling/abnormal gait

## 2023-09-22 NOTE — H&P PST ADULT - HISTORY OF PRESENT ILLNESS
62 y/o F with H/O: HTN, DM, HLD, Obesity presents to PST for pre op evaluation with long term h/o bilateral knee pain  S/P steroid injections x 3 with moderate relief. Had intermittent P.T with minimal relief. Pre op diagnosis: unilateral primary osteoarthritis right knee. Now schedule for right total knee arthroplasty with Dr. Guzman.  62 y/o F with H/O: HTN, DM2, HLD, Obesity presents to PST for pre op evaluation with long term h/o bilateral knee pain  S/P steroid injections x 3 with moderate relief. Had intermittent P.T with minimal relief. Pre op diagnosis: unilateral primary osteoarthritis right knee. Now scheduled for right total knee arthroplasty with Dr. Guzman.

## 2023-09-22 NOTE — H&P PST ADULT - PROBLEM SELECTOR PLAN 2
Pt. instructed to hold Metformin the morning of procedure. Accucheck DOS.  Patient will obtain medical clearance as per surgeons request-copy requested for high risk procedure and multiple comorbidities

## 2023-09-22 NOTE — H&P PST ADULT - PROBLEM SELECTOR PLAN 1
-Scheduled for right total knee arthroplasty with Dr. Guzman on 09/27/2023.  -Verbal and written pre-op instructions provided to patient. Patient verbalized understanding and will call surgeons office for revised instructions if surgery is rescheduled.   -Pepcid for GI prophylaxis provided.   -Patient given verbal and written instruction with teach back on chlorhexidine shampoo, and the patient verbalized understanding with return demonstration.

## 2023-09-22 NOTE — H&P PST ADULT - PROBLEM SELECTOR PROBLEM 2
For Your Well Being: Upper Respiratory Infections - Adult    Colds and upper respiratory infections often last 7 to 14 days. They are caused by viruses. Antibiotics are not an effective treatment for viruses.    Symptoms of upper respiratory infections or colds include:  Sneezing  Cough  Runny nose  Sore throat  Loss of appetite  General body aches  Fatigue  Fever    Your symptoms may be managed at home in this way:  Rest at home if there is a fever.  Drink plenty of liquids.  For fever, headache, sore throat and body aches, take acetaminophen (Tylenol) or ibuprofen (Advil/Nuprin) as directed.  For sore throat, try warm salt water gargles, throat lozenges and cold fluids.  For stuffy nose, a room humidifier or an over-the-counter nasal decongestant (pseudoephedrine 30mg, 1 tablet every 6 hours as needed) may be helpful. Follow label directions.  Do not smoke or be exposed to cigarette/pipe smoke.  If cough becomes bothersome, begin Mucinex DM, 1-2 tablets every 12 hours as needed.  Cover coughs and wash hands frequently for infection control.  Tea, honey, humidifier    Fever  Take your temperature every 4 hours during the fever.  Normal temperatures are:   -oral 98.6   -rectal 99.6   -underarm 97.6 degrees F (Fahrenheit)(least accurate)  You can treat your fever with Tylenol or ibuprofen (Advil/Nuprin/Motrin) when your temperature is:   - oral 101 degrees F or higher   - rectal 102 degrees F or higher   - underarm 100 degrees or higher  Follow the instructions on the label.  Acetaminophen (Tylenol) rectal suppositories can be used for people who are vomiting.  Dress lightly to allow body heat to escape. If shivering, cover with a light blanket until shivering stops. Maintain normal room temperature.  Take frequent amounts of cool liquids.           Type 2 diabetes mellitus

## 2023-09-22 NOTE — H&P PST ADULT - ATTENDING COMMENTS
Ambreen  used    Riyaduc Tj is a 62-year-old female, past medical history of hypertension, diabetes, and hyperlipidemia, who presented to the office for her right knee pain. Patient had been experiencing bilateral knee pain for about 10 years, but the pain worsened over the last 2 years. Patient underwent left TKA on 5/22/2023. She did well with her left TKA, but continued to have right knee pain. Pain was worse with walking and activity. The pain was affecting her quality of life. She had tried 3-4 corticosteroid injections in her bilateral knees, which lasted about 3 months. Her last injection was on 1/4/2023, which lasted about 2 to 3 months. She had tried physical therapy and home exercises. She has tried Celebrex, Tylenol, and Motrin. XRays showed severe right knee osteoarthritis. Patient was indicated for a Right Total Knee Arthroplasty. She was cleared by Medicine.    Discussed the operative and nonoperative management of knee osteoarthritis at length with the patient. Nonoperative management would consist of pain control, physical therapy, and anti-inflammatories. The patient had failed conservative management, including physical therapy, anti-inflammatories, and injections. The patient did not want to continue nonoperative management due to the impact knee pain has had on the patient’s quality of life. Operative management would consist of total knee arthroplasty. The risks, benefits and alternatives to total knee arthroplasty were discussed with the patient in detail and the patient elected to proceed with surgery. Discussed the surgical plan and implants with the patient, including robotic assisted total knee arthroplasty. Discussed the recovery process following total knee arthroplasty at length, including, but not limited to, inpatient hospital stay, physical therapy, recovery, antibiotics, and DVT prophylaxis. Surgical risks including fracture requiring fixation, instability or dislocation, temporary or permanent leg length inequality, risks of cementation, infection, bleeding, stiffness, failure to alleviate pain, failure to achieve desired results, need for further surgery, scar tissue formation, hardware failure, component loosening, chronic pain, injury to nerves resulting in extremity dysfunction, injury to arteries and veins, deep vein thrombosis or pulmonary embolism requiring anticoagulation and medical risk factors including heart attack, stroke, death, neurological injury, pneumonia, kidney or other organ failure were discussed with the patient at length.    Following discussion, patient elected to proceed with Right Total Knee Arthroplasty with EMIR Robotic Assistance. Informed consent was obtained. Patient's leg was then marked with verbal confirmation of the patient. Patient was understanding and in agreement with the operative plan. All questions were answered.    Assessment/Plan:   Gurpreet Spencer is a 62 year old female who presented for Right Total Knee Arthroplasty with MEIR Robotic Assistance    Plan:  -Right Total Knee Arthroplasty with MEIR Robotic Assistance  -Clearance in Chart.

## 2023-09-23 LAB
CULTURE RESULTS: NO GROWTH — SIGNIFICANT CHANGE UP
SPECIMEN SOURCE: SIGNIFICANT CHANGE UP

## 2023-09-25 ENCOUNTER — APPOINTMENT (OUTPATIENT)
Dept: CT IMAGING | Facility: CLINIC | Age: 63
End: 2023-09-25
Payer: MEDICAID

## 2023-09-25 ENCOUNTER — OUTPATIENT (OUTPATIENT)
Dept: OUTPATIENT SERVICES | Facility: HOSPITAL | Age: 63
LOS: 1 days | End: 2023-09-25
Payer: MEDICAID

## 2023-09-25 DIAGNOSIS — Z90.710 ACQUIRED ABSENCE OF BOTH CERVIX AND UTERUS: Chronic | ICD-10-CM

## 2023-09-25 DIAGNOSIS — Z96.652 PRESENCE OF LEFT ARTIFICIAL KNEE JOINT: Chronic | ICD-10-CM

## 2023-09-25 DIAGNOSIS — Z96.659 PRESENCE OF UNSPECIFIED ARTIFICIAL KNEE JOINT: ICD-10-CM

## 2023-09-25 DIAGNOSIS — Z90.49 ACQUIRED ABSENCE OF OTHER SPECIFIED PARTS OF DIGESTIVE TRACT: Chronic | ICD-10-CM

## 2023-09-25 DIAGNOSIS — Z98.49 CATARACT EXTRACTION STATUS, UNSPECIFIED EYE: Chronic | ICD-10-CM

## 2023-09-25 DIAGNOSIS — Z98.890 OTHER SPECIFIED POSTPROCEDURAL STATES: Chronic | ICD-10-CM

## 2023-09-25 PROBLEM — M19.90 UNSPECIFIED OSTEOARTHRITIS, UNSPECIFIED SITE: Chronic | Status: ACTIVE | Noted: 2023-09-22

## 2023-09-25 PROCEDURE — 73700 CT LOWER EXTREMITY W/O DYE: CPT

## 2023-09-25 PROCEDURE — 73700 CT LOWER EXTREMITY W/O DYE: CPT | Mod: 26,RT

## 2023-09-26 ENCOUNTER — TRANSCRIPTION ENCOUNTER (OUTPATIENT)
Age: 63
End: 2023-09-26

## 2023-09-27 ENCOUNTER — TRANSCRIPTION ENCOUNTER (OUTPATIENT)
Age: 63
End: 2023-09-27

## 2023-09-27 ENCOUNTER — INPATIENT (INPATIENT)
Facility: HOSPITAL | Age: 63
LOS: 5 days | Discharge: HOME | End: 2023-10-03
Attending: STUDENT IN AN ORGANIZED HEALTH CARE EDUCATION/TRAINING PROGRAM | Admitting: STUDENT IN AN ORGANIZED HEALTH CARE EDUCATION/TRAINING PROGRAM
Payer: MEDICAID

## 2023-09-27 ENCOUNTER — APPOINTMENT (OUTPATIENT)
Dept: ORTHOPEDIC SURGERY | Facility: HOSPITAL | Age: 63
End: 2023-09-27

## 2023-09-27 VITALS
RESPIRATION RATE: 16 BRPM | HEIGHT: 64 IN | OXYGEN SATURATION: 100 % | WEIGHT: 182.1 LBS | TEMPERATURE: 98 F | SYSTOLIC BLOOD PRESSURE: 186 MMHG | DIASTOLIC BLOOD PRESSURE: 80 MMHG

## 2023-09-27 DIAGNOSIS — Z90.710 ACQUIRED ABSENCE OF BOTH CERVIX AND UTERUS: Chronic | ICD-10-CM

## 2023-09-27 DIAGNOSIS — Z96.652 PRESENCE OF LEFT ARTIFICIAL KNEE JOINT: Chronic | ICD-10-CM

## 2023-09-27 DIAGNOSIS — Z90.49 ACQUIRED ABSENCE OF OTHER SPECIFIED PARTS OF DIGESTIVE TRACT: Chronic | ICD-10-CM

## 2023-09-27 DIAGNOSIS — Z98.49 CATARACT EXTRACTION STATUS, UNSPECIFIED EYE: Chronic | ICD-10-CM

## 2023-09-27 DIAGNOSIS — M17.11 UNILATERAL PRIMARY OSTEOARTHRITIS, RIGHT KNEE: ICD-10-CM

## 2023-09-27 DIAGNOSIS — Z98.890 OTHER SPECIFIED POSTPROCEDURAL STATES: Chronic | ICD-10-CM

## 2023-09-27 LAB
GLUCOSE BLDC GLUCOMTR-MCNC: 113 MG/DL — HIGH (ref 70–99)
GLUCOSE BLDC GLUCOMTR-MCNC: 248 MG/DL — HIGH (ref 70–99)

## 2023-09-27 PROCEDURE — 0055T BONE SRGRY CMPTR CT/MRI IMAG: CPT

## 2023-09-27 PROCEDURE — 73560 X-RAY EXAM OF KNEE 1 OR 2: CPT | Mod: 26,RT

## 2023-09-27 PROCEDURE — 73610 X-RAY EXAM OF ANKLE: CPT | Mod: 26,RT

## 2023-09-27 PROCEDURE — 20985 CPTR-ASST DIR MS PX: CPT | Mod: RT

## 2023-09-27 DEVICE — MAKO BONE PIN 4MM X 140MM: Type: IMPLANTABLE DEVICE | Site: RIGHT | Status: FUNCTIONAL

## 2023-09-27 DEVICE — MAKO BONE PIN 4MM X 110MM: Type: IMPLANTABLE DEVICE | Site: RIGHT | Status: FUNCTIONAL

## 2023-09-27 DEVICE — PIN PINABALL PRELOADABLE 90MMX3.2MM: Type: IMPLANTABLE DEVICE | Site: RIGHT | Status: FUNCTIONAL

## 2023-09-27 DEVICE — BASEPLATE TIB UNIV TRIATHLON SZ 4: Type: IMPLANTABLE DEVICE | Site: RIGHT | Status: FUNCTIONAL

## 2023-09-27 DEVICE — COMP PATELLA ASYMMETRIC X3 32X10MM: Type: IMPLANTABLE DEVICE | Site: RIGHT | Status: FUNCTIONAL

## 2023-09-27 DEVICE — COMP FEM TRIATHLON PS SZ 4 RT: Type: IMPLANTABLE DEVICE | Site: RIGHT | Status: FUNCTIONAL

## 2023-09-27 DEVICE — INSERT TIB BEARING PS X3 SZ 4 11MM: Type: IMPLANTABLE DEVICE | Site: RIGHT | Status: FUNCTIONAL

## 2023-09-27 DEVICE — FEM DIST FIXATION PEG MOD TKS: Type: IMPLANTABLE DEVICE | Site: RIGHT | Status: FUNCTIONAL

## 2023-09-27 DEVICE — CEMENT SIMPLEX P 40GM: Type: IMPLANTABLE DEVICE | Site: RIGHT | Status: FUNCTIONAL

## 2023-09-27 RX ORDER — KETOROLAC TROMETHAMINE 30 MG/ML
15 SYRINGE (ML) INJECTION EVERY 6 HOURS
Refills: 0 | Status: DISCONTINUED | OUTPATIENT
Start: 2023-09-27 | End: 2023-09-28

## 2023-09-27 RX ORDER — SODIUM CHLORIDE 9 MG/ML
1000 INJECTION, SOLUTION INTRAVENOUS
Refills: 0 | Status: DISCONTINUED | OUTPATIENT
Start: 2023-09-27 | End: 2023-10-03

## 2023-09-27 RX ORDER — ACETAMINOPHEN 500 MG
975 TABLET ORAL EVERY 8 HOURS
Refills: 0 | Status: DISCONTINUED | OUTPATIENT
Start: 2023-09-28 | End: 2023-10-03

## 2023-09-27 RX ORDER — ATORVASTATIN CALCIUM 80 MG/1
1 TABLET, FILM COATED ORAL
Refills: 0 | DISCHARGE

## 2023-09-27 RX ORDER — LOSARTAN/HYDROCHLOROTHIAZIDE 100MG-25MG
1 TABLET ORAL
Refills: 0 | DISCHARGE

## 2023-09-27 RX ORDER — CELECOXIB 200 MG/1
200 CAPSULE ORAL
Refills: 0 | Status: DISCONTINUED | OUTPATIENT
Start: 2023-09-27 | End: 2023-10-03

## 2023-09-27 RX ORDER — HYDROMORPHONE HYDROCHLORIDE 2 MG/ML
0.5 INJECTION INTRAMUSCULAR; INTRAVENOUS; SUBCUTANEOUS
Refills: 0 | Status: DISCONTINUED | OUTPATIENT
Start: 2023-09-27 | End: 2023-09-28

## 2023-09-27 RX ORDER — HYDRALAZINE HCL 50 MG
5 TABLET ORAL ONCE
Refills: 0 | Status: COMPLETED | OUTPATIENT
Start: 2023-09-27 | End: 2023-09-27

## 2023-09-27 RX ORDER — SODIUM CHLORIDE 9 MG/ML
500 INJECTION, SOLUTION INTRAVENOUS ONCE
Refills: 0 | Status: COMPLETED | OUTPATIENT
Start: 2023-09-27 | End: 2023-09-28

## 2023-09-27 RX ORDER — OXYCODONE HYDROCHLORIDE 5 MG/1
5 TABLET ORAL
Refills: 0 | Status: DISCONTINUED | OUTPATIENT
Start: 2023-09-27 | End: 2023-10-03

## 2023-09-27 RX ORDER — MAGNESIUM HYDROXIDE 400 MG/1
30 TABLET, CHEWABLE ORAL DAILY
Refills: 0 | Status: DISCONTINUED | OUTPATIENT
Start: 2023-09-27 | End: 2023-10-03

## 2023-09-27 RX ORDER — TRAMADOL HYDROCHLORIDE 50 MG/1
50 TABLET ORAL EVERY 8 HOURS
Refills: 0 | Status: DISCONTINUED | OUTPATIENT
Start: 2023-09-27 | End: 2023-10-03

## 2023-09-27 RX ORDER — PANTOPRAZOLE SODIUM 20 MG/1
40 TABLET, DELAYED RELEASE ORAL
Refills: 0 | Status: DISCONTINUED | OUTPATIENT
Start: 2023-09-27 | End: 2023-10-03

## 2023-09-27 RX ORDER — SODIUM CHLORIDE 9 MG/ML
500 INJECTION, SOLUTION INTRAVENOUS ONCE
Refills: 0 | Status: COMPLETED | OUTPATIENT
Start: 2023-09-27 | End: 2023-09-27

## 2023-09-27 RX ORDER — SENNA PLUS 8.6 MG/1
2 TABLET ORAL AT BEDTIME
Refills: 0 | Status: DISCONTINUED | OUTPATIENT
Start: 2023-09-27 | End: 2023-10-03

## 2023-09-27 RX ORDER — CELECOXIB 200 MG/1
1 CAPSULE ORAL
Refills: 0 | DISCHARGE

## 2023-09-27 RX ORDER — INSULIN LISPRO 100/ML
VIAL (ML) SUBCUTANEOUS
Refills: 0 | Status: DISCONTINUED | OUTPATIENT
Start: 2023-09-27 | End: 2023-10-03

## 2023-09-27 RX ORDER — DEXTROSE 50 % IN WATER 50 %
25 SYRINGE (ML) INTRAVENOUS ONCE
Refills: 0 | Status: DISCONTINUED | OUTPATIENT
Start: 2023-09-27 | End: 2023-10-03

## 2023-09-27 RX ORDER — DULAGLUTIDE 4.5 MG/.5ML
1.5 INJECTION, SOLUTION SUBCUTANEOUS
Refills: 0 | DISCHARGE

## 2023-09-27 RX ORDER — ASPIRIN/CALCIUM CARB/MAGNESIUM 324 MG
325 TABLET ORAL
Refills: 0 | Status: DISCONTINUED | OUTPATIENT
Start: 2023-09-27 | End: 2023-10-03

## 2023-09-27 RX ORDER — SENNA PLUS 8.6 MG/1
2 TABLET ORAL AT BEDTIME
Refills: 0 | Status: DISCONTINUED | OUTPATIENT
Start: 2023-09-27 | End: 2023-10-02

## 2023-09-27 RX ORDER — FOLIC ACID 0.8 MG
1 TABLET ORAL
Refills: 0 | DISCHARGE

## 2023-09-27 RX ORDER — GLUCAGON INJECTION, SOLUTION 0.5 MG/.1ML
1 INJECTION, SOLUTION SUBCUTANEOUS ONCE
Refills: 0 | Status: DISCONTINUED | OUTPATIENT
Start: 2023-09-27 | End: 2023-10-03

## 2023-09-27 RX ORDER — CEFAZOLIN SODIUM 1 G
2000 VIAL (EA) INJECTION EVERY 8 HOURS
Refills: 0 | Status: COMPLETED | OUTPATIENT
Start: 2023-09-27 | End: 2023-09-28

## 2023-09-27 RX ORDER — OXYCODONE HYDROCHLORIDE 5 MG/1
10 TABLET ORAL
Refills: 0 | Status: DISCONTINUED | OUTPATIENT
Start: 2023-09-27 | End: 2023-10-03

## 2023-09-27 RX ORDER — FERROUS SULFATE 325(65) MG
325 TABLET ORAL DAILY
Refills: 0 | Status: DISCONTINUED | OUTPATIENT
Start: 2023-09-27 | End: 2023-10-03

## 2023-09-27 RX ORDER — ATORVASTATIN CALCIUM 80 MG/1
10 TABLET, FILM COATED ORAL AT BEDTIME
Refills: 0 | Status: DISCONTINUED | OUTPATIENT
Start: 2023-09-27 | End: 2023-10-03

## 2023-09-27 RX ORDER — ACETAMINOPHEN 500 MG
1000 TABLET ORAL EVERY 8 HOURS
Refills: 0 | Status: COMPLETED | OUTPATIENT
Start: 2023-09-27 | End: 2023-09-27

## 2023-09-27 RX ORDER — POLYETHYLENE GLYCOL 3350 17 G/17G
17 POWDER, FOR SOLUTION ORAL AT BEDTIME
Refills: 0 | Status: DISCONTINUED | OUTPATIENT
Start: 2023-09-27 | End: 2023-10-03

## 2023-09-27 RX ORDER — DEXTROSE 50 % IN WATER 50 %
12.5 SYRINGE (ML) INTRAVENOUS ONCE
Refills: 0 | Status: DISCONTINUED | OUTPATIENT
Start: 2023-09-27 | End: 2023-10-03

## 2023-09-27 RX ORDER — GABAPENTIN 400 MG/1
100 CAPSULE ORAL THREE TIMES A DAY
Refills: 0 | Status: DISCONTINUED | OUTPATIENT
Start: 2023-09-27 | End: 2023-10-03

## 2023-09-27 RX ORDER — DEXTROSE 50 % IN WATER 50 %
15 SYRINGE (ML) INTRAVENOUS ONCE
Refills: 0 | Status: DISCONTINUED | OUTPATIENT
Start: 2023-09-27 | End: 2023-10-03

## 2023-09-27 RX ORDER — ONDANSETRON 8 MG/1
4 TABLET, FILM COATED ORAL EVERY 6 HOURS
Refills: 0 | Status: DISCONTINUED | OUTPATIENT
Start: 2023-09-27 | End: 2023-10-03

## 2023-09-27 RX ORDER — LOSARTAN POTASSIUM 100 MG/1
100 TABLET, FILM COATED ORAL DAILY
Refills: 0 | Status: DISCONTINUED | OUTPATIENT
Start: 2023-09-27 | End: 2023-10-03

## 2023-09-27 RX ADMIN — SODIUM CHLORIDE 500 MILLILITER(S): 9 INJECTION, SOLUTION INTRAVENOUS at 18:07

## 2023-09-27 RX ADMIN — Medication 100 MILLIGRAM(S): at 22:00

## 2023-09-27 RX ADMIN — Medication 15 MILLIGRAM(S): at 18:35

## 2023-09-27 RX ADMIN — Medication 400 MILLIGRAM(S): at 22:30

## 2023-09-27 RX ADMIN — ATORVASTATIN CALCIUM 10 MILLIGRAM(S): 80 TABLET, FILM COATED ORAL at 22:25

## 2023-09-27 RX ADMIN — Medication 1 TABLET(S): at 21:58

## 2023-09-27 RX ADMIN — PANTOPRAZOLE SODIUM 40 MILLIGRAM(S): 20 TABLET, DELAYED RELEASE ORAL at 13:09

## 2023-09-27 RX ADMIN — Medication 325 MILLIGRAM(S): at 21:59

## 2023-09-27 RX ADMIN — SENNA PLUS 2 TABLET(S): 8.6 TABLET ORAL at 21:59

## 2023-09-27 RX ADMIN — SODIUM CHLORIDE 500 MILLILITER(S): 9 INJECTION, SOLUTION INTRAVENOUS at 21:49

## 2023-09-27 RX ADMIN — GABAPENTIN 100 MILLIGRAM(S): 400 CAPSULE ORAL at 21:58

## 2023-09-27 RX ADMIN — Medication 5 MILLIGRAM(S): at 20:07

## 2023-09-27 RX ADMIN — Medication 15 MILLIGRAM(S): at 18:12

## 2023-09-27 RX ADMIN — SODIUM CHLORIDE 30 MILLILITER(S): 9 INJECTION, SOLUTION INTRAVENOUS at 18:07

## 2023-09-27 RX ADMIN — OXYCODONE HYDROCHLORIDE 5 MILLIGRAM(S): 5 TABLET ORAL at 20:08

## 2023-09-27 RX ADMIN — OXYCODONE HYDROCHLORIDE 5 MILLIGRAM(S): 5 TABLET ORAL at 20:45

## 2023-09-27 RX ADMIN — CHLORHEXIDINE GLUCONATE 1 APPLICATION(S): 213 SOLUTION TOPICAL at 12:33

## 2023-09-27 RX ADMIN — CELECOXIB 200 MILLIGRAM(S): 200 CAPSULE ORAL at 22:26

## 2023-09-27 RX ADMIN — TRAMADOL HYDROCHLORIDE 50 MILLIGRAM(S): 50 TABLET ORAL at 13:09

## 2023-09-27 NOTE — DISCHARGE NOTE PROVIDER - CARE PROVIDER_API CALL
Jose Antonio Glover  Orthopaedic Surgery  [address] [place] [address], [address]  [place] [address]  Phone: [phone]  Fax: [phone]  Established Patient  Follow Up Time:

## 2023-09-27 NOTE — DISCHARGE NOTE PROVIDER - NSDCCPTREATMENT_GEN_ALL_CORE_FT
PRINCIPAL PROCEDURE  Procedure: Right total knee arthroplasty  Findings and Treatment: Pain control:    Standing:         -Acetaminophen 500mg - 2 tabs every 8 hours  As needed:        -Tramadol 50mg - 1 tab every 6 hours - Take only if needed for MODERATE pain       -oxycodone 5mg - 1 tab every 4-6 hours - Take only if needed for SEVERE or BREAKTHROUGH pain  Oxycodone and Tramadol have been sent to your pharmacy. Please do not drive, operate machinery, or make important decisions while taking these medications.   Other Medications: (Standing)  -Aspirin (Enteric Coated) 325mg every 12 hours - to prevent blood clots (for 6 weeks post operatively.)  -Protonix 40mg - 1 tab every 24 hours - to prevent stomach irritation/ulcers  -Senna 8.6mg - 2 pills every 24 hours - stool softener  -Miralax 17g - daily - constipation   Follow up: Please follow up at your prescheduled post-operative follow up appointment with Dr. Glover for 2 weeks after hospital discharge. Please call with any questions or concerns including fevers, worsening pain, pus from the wounds, redness of the skin and difficulty breathing or heaviness in the chest at [phone].

## 2023-09-27 NOTE — DISCHARGE NOTE PROVIDER - HOSPITAL COURSE
This is a 64yo Female with PMH of HTN, HLD, and DM and PSH of L TKA, REGGIE BSO and cholecystectomy who presents to [facility] for orthopedic surgery. Patient s/p right TKA with Dr. Glover on 9/27/2023. Patient tolerated the procedure well without any intraoperative complications. Patient tolerated physical therapy well, and the pain was controlled. Patient is weight bearing as tolerated with cane/walker as needed. Seen by medical attending for continuity of care and management and cleared for safe discharge. Keep dressing/incision clean, dry and intact. Any suture/staples to be removed on post-op day #14 at your office visit. Patient is on 325mg of ASA for DVT prophylaxis, please take for 6 weeks unless otherwise instructed by your surgeon. Please follow up with Dr. Glover in 2 weeks. Please follow up with your PMD for continuity of care and management as medications may have changed. This is a 62yo Female with PMH of HTN, HLD, and DM and PSH of L TKA, REGGIE BSO and cholecystectomy who presents to [facility] for orthopedic surgery. Patient s/p right TKA with Dr. Glover on 9/27/2023. Patient tolerated the procedure well without any intraoperative complications. Patient tolerated physical therapy well, and the pain was controlled. Patient is weight bearing as tolerated with cane/walker as needed. Seen by medical attending for continuity of care and management and cleared for safe discharge. Keep dressing/incision clean, dry and intact. Any suture/staples to be removed on post-op day #14 at your office visit. Patient is on 325mg of ASA for DVT prophylaxis, please take for 6 weeks unless otherwise instructed by your surgeon. Please follow up with Dr. Glover in 2 weeks on 10/13 for routine care/dressing change. Please follow up with your PMD for continuity of care and management as medications may have changed.

## 2023-09-27 NOTE — DISCHARGE NOTE PROVIDER - NSDCMRMEDTOKEN_GEN_ALL_CORE_FT
aspirin 81 mg oral tablet: 1 tab(s) orally once a day  CeleBREX 200 mg oral capsule: 1 cap(s) orally 2 times a day as needed LD 9/22/2023  docusate sodium 100 mg oral tablet: 1 tab(s) orally once a day  folic acid 1 mg oral tablet: 1 tab(s) orally once a day  Lipitor 10 mg oral tablet: 1 tab(s) orally once a day (at bedtime)  losartan-hydroCHLOROthiazide 100 mg-12.5 mg oral tablet: 1 tab(s) orally once a day  Prevacid 30 mg oral delayed release capsule: 1 cap(s) orally once a day  Trulicity Pen 1.5 mg/0.5 mL subcutaneous solution: 1.5 milligram(s) subcutaneously once a week   acetaminophen 325 mg oral tablet: 3 tab(s) orally every 8 hours  aspirin 325 mg oral delayed release tablet: 1 tab(s) orally 2 times a day  CeleBREX 200 mg oral capsule: 1 cap(s) orally 2 times a day as needed LD 9/22/2023  folic acid 1 mg oral tablet: 1 tab(s) orally once a day  Lipitor 10 mg oral tablet: 1 tab(s) orally once a day (at bedtime)  losartan-hydroCHLOROthiazide 100 mg-12.5 mg oral tablet: 1 tab(s) orally once a day  Narcan 4 mg/0.1 mL nasal spray: 4 milligram(s) intranasally once a day  oxyCODONE 5 mg oral tablet: 1 tab(s) orally every 4 hours as needed for  severe pain MDD: 6  pantoprazole 40 mg oral delayed release tablet: 1 tab(s) orally once a day (before a meal)  polyethylene glycol 3350 oral powder for reconstitution: 17 gram(s) orally once a day (at bedtime)  senna leaf extract oral tablet: 2 tab(s) orally once a day (at bedtime)  traMADol 50 mg oral tablet: 1 tab(s) orally every 8 hours as needed for  moderate pain MDD: 3  Trulicity Pen 1.5 mg/0.5 mL subcutaneous solution: 1.5 milligram(s) subcutaneously once a week

## 2023-09-27 NOTE — DISCHARGE NOTE PROVIDER - NSDCFUSCHEDAPPT_GEN_ALL_CORE_FT
Jose Antonio Glover  [facility]  ORTHOSURG [address] [facility]  Scheduled Appointment: 10/13/2023

## 2023-09-27 NOTE — ASU PREOP CHECKLIST - PATIENT'S PERSONAL PROPERTY GIVEN TO
What Type Of Note Output Would You Prefer (Optional)?: Standard Output How Severe Is Your Rash?: moderate Is This A New Presentation, Or A Follow-Up?: Rash Additional History: Pt has rx from PCP for Metronidazole but only used it 1-2 times. pacu # 10 pacu # 3

## 2023-09-27 NOTE — PATIENT PROFILE ADULT - FALL HARM RISK - HARM RISK INTERVENTIONS
Assistance with ambulation/Assistance OOB with selected safe patient handling equipment/Communicate Risk of Fall with Harm to all staff/Discuss with provider need for PT consult/Monitor gait and stability/Provide patient with walking aids - walker, cane, crutches/Reinforce activity limits and safety measures with patient and family/Sit up slowly, dangle for a short time, stand at bedside before walking/Tailored Fall Risk Interventions/Use of alarms - bed, chair and/or voice tab/Visual Cue: Yellow wristband and red socks/Bed in lowest position, wheels locked, appropriate side rails in place/Call bell, personal items and telephone in reach/Instruct patient to call for assistance before getting out of bed or chair/Non-slip footwear when patient is out of bed/[place] to call system/Physically safe environment - no spills, clutter or unnecessary equipment/Purposeful Proactive Rounding/Room/bathroom lighting operational, light cord in reach

## 2023-09-27 NOTE — ASU PREOP CHECKLIST - 1.
Ambreen primary language requesting daughter in law to translate Ambreen primary language requesting daughter in law to translate Lukas #[phone]

## 2023-09-27 NOTE — PROGRESS NOTE ADULT - ASSESSMENT
A/P: Patient is a 63y y/o Female s/p R total knee arthroplasty, POD #0   - Pain control  - Antibiotics - Ancef postop  - DVT ppx  - ASA 325mg  - Incentive spirometry  - Venodynes  - F/U AM Labs  - PT/OT/WBAT  - Notify Orthopedics with any questions   A/P: Patient is a 63y y/o Female s/p R total knee arthroplasty, POD #0   Ambreen  [facility] ID# [id number] used    - Pain control  - Antibiotics - Ancef postop  - DVT ppx  - ASA 325mg  - Incentive spirometry  - Venodynes  - F/U AM Labs  - PT/OT/WBAT  - Notify Orthopedics with any questions

## 2023-09-27 NOTE — DISCHARGE NOTE PROVIDER - NSRESEARCHGRANT_PROPHYLAXISRECOMFT_GEN_A_CORE
PGY1 Nursery Note    Alerted by 5th floor nursery nurse at 2000 that baby's weight loss has worsened, now at 10.3% of birth weight compared to 8.5% 25hrs prior. Mom exclusively breastfeeding. Lactation on board as of 6/27. Advised mom to start supplementing tonight. Mom tearful and upset regarding  Lactation will see mom and baby again tonight to provide reassurance and guidance regarding feeding cup to avoid nipple confusion.
This is a surgical and/or non-medical patient.

## 2023-09-28 ENCOUNTER — TRANSCRIPTION ENCOUNTER (OUTPATIENT)
Age: 63
End: 2023-09-28

## 2023-09-28 LAB
ANION GAP SERPL CALC-SCNC: 9 MMOL/L — SIGNIFICANT CHANGE UP (ref 7–14)
BUN SERPL-MCNC: 10 MG/DL — SIGNIFICANT CHANGE UP (ref 7–23)
CALCIUM SERPL-MCNC: 8.4 MG/DL — SIGNIFICANT CHANGE UP (ref 8.4–10.5)
CHLORIDE SERPL-SCNC: 103 MMOL/L — SIGNIFICANT CHANGE UP (ref 98–107)
CO2 SERPL-SCNC: 26 MMOL/L — SIGNIFICANT CHANGE UP (ref 22–31)
CREAT SERPL-MCNC: 0.66 MG/DL — SIGNIFICANT CHANGE UP (ref 0.5–1.3)
EGFR: 98 ML/MIN/1.73M2 — SIGNIFICANT CHANGE UP
GLUCOSE BLDC GLUCOMTR-MCNC: 108 MG/DL — HIGH (ref 70–99)
GLUCOSE BLDC GLUCOMTR-MCNC: 111 MG/DL — HIGH (ref 70–99)
GLUCOSE BLDC GLUCOMTR-MCNC: 117 MG/DL — HIGH (ref 70–99)
GLUCOSE BLDC GLUCOMTR-MCNC: 98 MG/DL — SIGNIFICANT CHANGE UP (ref 70–99)
GLUCOSE SERPL-MCNC: 121 MG/DL — HIGH (ref 70–99)
HCT VFR BLD CALC: 27 % — LOW (ref 34.5–45)
HGB BLD-MCNC: 9.2 G/DL — LOW (ref 11.5–15.5)
MCHC RBC-ENTMCNC: 28 PG — SIGNIFICANT CHANGE UP (ref 27–34)
MCHC RBC-ENTMCNC: 34.1 GM/DL — SIGNIFICANT CHANGE UP (ref 32–36)
MCV RBC AUTO: 82.1 FL — SIGNIFICANT CHANGE UP (ref 80–100)
NRBC # BLD: 0 /100 WBCS — SIGNIFICANT CHANGE UP (ref 0–0)
NRBC # FLD: 0 K/UL — SIGNIFICANT CHANGE UP (ref 0–0)
PLATELET # BLD AUTO: 214 K/UL — SIGNIFICANT CHANGE UP (ref 150–400)
POTASSIUM SERPL-MCNC: 3.5 MMOL/L — SIGNIFICANT CHANGE UP (ref 3.5–5.3)
POTASSIUM SERPL-SCNC: 3.5 MMOL/L — SIGNIFICANT CHANGE UP (ref 3.5–5.3)
RBC # BLD: 3.29 M/UL — LOW (ref 3.8–5.2)
RBC # FLD: 13.2 % — SIGNIFICANT CHANGE UP (ref 10.3–14.5)
SARS-COV-2 RNA SPEC QL NAA+PROBE: SIGNIFICANT CHANGE UP
SODIUM SERPL-SCNC: 138 MMOL/L — SIGNIFICANT CHANGE UP (ref 135–145)
WBC # BLD: 9.29 K/UL — SIGNIFICANT CHANGE UP (ref 3.8–10.5)
WBC # FLD AUTO: 9.29 K/UL — SIGNIFICANT CHANGE UP (ref 3.8–10.5)

## 2023-09-28 RX ORDER — INFLUENZA VIRUS VACCINE 15; 15; 15; 15 UG/.5ML; UG/.5ML; UG/.5ML; UG/.5ML
0.5 SUSPENSION INTRAMUSCULAR ONCE
Refills: 0 | Status: COMPLETED | OUTPATIENT
Start: 2023-09-28 | End: 2023-10-03

## 2023-09-28 RX ADMIN — Medication 975 MILLIGRAM(S): at 14:22

## 2023-09-28 RX ADMIN — OXYCODONE HYDROCHLORIDE 5 MILLIGRAM(S): 5 TABLET ORAL at 06:19

## 2023-09-28 RX ADMIN — SODIUM CHLORIDE 500 MILLILITER(S): 9 INJECTION, SOLUTION INTRAVENOUS at 05:14

## 2023-09-28 RX ADMIN — OXYCODONE HYDROCHLORIDE 10 MILLIGRAM(S): 5 TABLET ORAL at 09:54

## 2023-09-28 RX ADMIN — Medication 325 MILLIGRAM(S): at 05:16

## 2023-09-28 RX ADMIN — Medication 15 MILLIGRAM(S): at 12:13

## 2023-09-28 RX ADMIN — OXYCODONE HYDROCHLORIDE 10 MILLIGRAM(S): 5 TABLET ORAL at 10:50

## 2023-09-28 RX ADMIN — Medication 325 MILLIGRAM(S): at 12:13

## 2023-09-28 RX ADMIN — Medication 15 MILLIGRAM(S): at 12:43

## 2023-09-28 RX ADMIN — Medication 1 TABLET(S): at 21:17

## 2023-09-28 RX ADMIN — Medication 15 MILLIGRAM(S): at 00:51

## 2023-09-28 RX ADMIN — Medication 975 MILLIGRAM(S): at 13:52

## 2023-09-28 RX ADMIN — Medication 1 TABLET(S): at 13:51

## 2023-09-28 RX ADMIN — GABAPENTIN 100 MILLIGRAM(S): 400 CAPSULE ORAL at 21:16

## 2023-09-28 RX ADMIN — CELECOXIB 200 MILLIGRAM(S): 200 CAPSULE ORAL at 17:53

## 2023-09-28 RX ADMIN — Medication 975 MILLIGRAM(S): at 05:15

## 2023-09-28 RX ADMIN — CELECOXIB 200 MILLIGRAM(S): 200 CAPSULE ORAL at 17:23

## 2023-09-28 RX ADMIN — GABAPENTIN 100 MILLIGRAM(S): 400 CAPSULE ORAL at 05:16

## 2023-09-28 RX ADMIN — Medication 325 MILLIGRAM(S): at 17:23

## 2023-09-28 RX ADMIN — Medication 1 TABLET(S): at 12:13

## 2023-09-28 RX ADMIN — PANTOPRAZOLE SODIUM 40 MILLIGRAM(S): 20 TABLET, DELAYED RELEASE ORAL at 05:15

## 2023-09-28 RX ADMIN — Medication 975 MILLIGRAM(S): at 06:15

## 2023-09-28 RX ADMIN — POLYETHYLENE GLYCOL 3350 17 GRAM(S): 17 POWDER, FOR SOLUTION ORAL at 05:20

## 2023-09-28 RX ADMIN — SENNA PLUS 2 TABLET(S): 8.6 TABLET ORAL at 21:16

## 2023-09-28 RX ADMIN — ATORVASTATIN CALCIUM 10 MILLIGRAM(S): 80 TABLET, FILM COATED ORAL at 21:17

## 2023-09-28 RX ADMIN — Medication 975 MILLIGRAM(S): at 22:16

## 2023-09-28 RX ADMIN — OXYCODONE HYDROCHLORIDE 10 MILLIGRAM(S): 5 TABLET ORAL at 22:16

## 2023-09-28 RX ADMIN — Medication 15 MILLIGRAM(S): at 00:20

## 2023-09-28 RX ADMIN — POLYETHYLENE GLYCOL 3350 17 GRAM(S): 17 POWDER, FOR SOLUTION ORAL at 21:15

## 2023-09-28 RX ADMIN — Medication 15 MILLIGRAM(S): at 05:47

## 2023-09-28 RX ADMIN — OXYCODONE HYDROCHLORIDE 5 MILLIGRAM(S): 5 TABLET ORAL at 05:19

## 2023-09-28 RX ADMIN — OXYCODONE HYDROCHLORIDE 10 MILLIGRAM(S): 5 TABLET ORAL at 01:23

## 2023-09-28 RX ADMIN — Medication 100 MILLIGRAM(S): at 05:16

## 2023-09-28 RX ADMIN — Medication 975 MILLIGRAM(S): at 21:16

## 2023-09-28 RX ADMIN — OXYCODONE HYDROCHLORIDE 10 MILLIGRAM(S): 5 TABLET ORAL at 21:15

## 2023-09-28 RX ADMIN — OXYCODONE HYDROCHLORIDE 10 MILLIGRAM(S): 5 TABLET ORAL at 00:20

## 2023-09-28 RX ADMIN — Medication 1 TABLET(S): at 05:15

## 2023-09-28 RX ADMIN — Medication 15 MILLIGRAM(S): at 05:17

## 2023-09-28 RX ADMIN — GABAPENTIN 100 MILLIGRAM(S): 400 CAPSULE ORAL at 13:51

## 2023-09-28 NOTE — DISCHARGE NOTE NURSING/CASE MANAGEMENT/SOCIAL WORK - PATIENT PORTAL LINK FT
You can access the FollowMyHealth Patient Portal offered by [facility] by registering at the following website: [email or web address]. By joining [employer]’s FollowMyHealth portal, you will also be able to view your health information using other applications (apps) compatible with our system.

## 2023-09-28 NOTE — DISCHARGE NOTE NURSING/CASE MANAGEMENT/SOCIAL WORK - NSDCPNINST_GEN_ALL_CORE
You have a post op appointment with Dr. Glover on October 13, 2023 @ 8:15am. If you are unable to keep this appointment, please call the office to reschedule. Call MD if you develop a fever, or if there is redness, swelling, drainage or pain not relieved by pain medication. No heavy lifting, bending, or straining to move your bowels. Take over the counter stool softeners as needed to prevent constipation which may be caused by pain medication.  You have a post op appointment with Dr. Glover on October 13, 2023 @ 8:15am. If you are unable to keep this appointment, please call the office to reschedule. Call MD if you develop a fever, or if there is redness, swelling, drainage or pain not relieved by pain medication. No heavy lifting, bending, or straining to move your bowels. Take over the counter stool softeners as needed to prevent constipation which may be caused by pain medication. Your A1C= 5.5. Continue to follow a consistent carbohydrate diet and take your medications for diabetes.

## 2023-09-28 NOTE — PHYSICAL THERAPY INITIAL EVALUATION ADULT - RANGE OF MOTION EXAMINATION, REHAB EVAL
right knee ROM grossly assessed to 80 degrees/bilateral upper extremity ROM was WFL (within functional limits)/bilateral lower extremity ROM was WFL (within functional limits)

## 2023-09-28 NOTE — PHYSICAL THERAPY INITIAL EVALUATION ADULT - ADDITIONAL COMMENTS
used, please refer to Assessment Flowsheet for information.   Pt states she lives with her daughter in law yuriy house with 7-8 steps to enter and 1 flight to bedroom, however, has a bed on the main level and will remain there. Prior to admission, pt was ambulating with a rolling walker. Pt owns a shower chair and commode.   Post PT evaluation, pt left semi-supine, alarm on, call bell and remote within reach, all precautions maintained, NAD. RN aware.

## 2023-09-28 NOTE — OCCUPATIONAL THERAPY INITIAL EVALUATION ADULT - GENERAL OBSERVATIONS, REHAB EVAL
Patient received semisupine in bed in NAD. + . Patient agrees to participate in OT evaluation. /50 mmHg. Patient left semisupine in bed in NAD. Patient tolerated OT evaluation well. Patient received semisupine in bed in NAD. Patient agrees to participate in OT evaluation. /50 mmHg. Patient left seated in bedside chair in NAD. Patient tolerated OT evaluation well.

## 2023-09-28 NOTE — DISCHARGE NOTE NURSING/CASE MANAGEMENT/SOCIAL WORK - NSDPDISTO_GEN_ALL_CORE
Pt. is afebrile and offers no complaints. In no acute distress. Right knee aquacel dressing: clean, dry and intact. Pt is ambulating with a walker, tolerating diet well, and voiding in adequate amounts./Sub-Acute rehab Pt. is afebrile and offers no complaints. In no acute distress. Right knee aquacel dressing: clean, dry and intact. Pt is ambulating with a walker, tolerating diet well, and voiding in adequate amounts./Home

## 2023-09-28 NOTE — OCCUPATIONAL THERAPY INITIAL EVALUATION ADULT - ADDITIONAL COMMENTS
Patient lives in a house with 8 steps to enter + flight of steps to bedroom. She lives with her daughter in law and will be staying on the main floor during recovery. She was independent with ADLs and utilized a walker for functional mobility. She has a shower chair and grab bars.

## 2023-09-28 NOTE — PHYSICAL THERAPY INITIAL EVALUATION ADULT - PERTINENT HX OF CURRENT PROBLEM, REHAB EVAL
63 year old Female with history of HTN, HLD, and DM and PSH of Left TKA, REGGIE BSO and cholecystectomy who presents to [facility] for orthopedic surgery. Patient s/p right TKA with Dr. Glover on 9/27/2023.

## 2023-09-28 NOTE — OCCUPATIONAL THERAPY INITIAL EVALUATION ADULT - PERTINENT HX OF CURRENT PROBLEM, REHAB EVAL
63 year old Female with history of HTN, HLD, and DM and PSH of Left TKA, REGGIE BSO and cholecystectomy who presents to [facility] for orthopedic surgery. Patient s/p right TKA with Dr. Glover on 9/27/2023.

## 2023-09-29 LAB
GLUCOSE BLDC GLUCOMTR-MCNC: 112 MG/DL — HIGH (ref 70–99)
GLUCOSE BLDC GLUCOMTR-MCNC: 115 MG/DL — HIGH (ref 70–99)
GLUCOSE BLDC GLUCOMTR-MCNC: 149 MG/DL — HIGH (ref 70–99)
GLUCOSE BLDC GLUCOMTR-MCNC: 74 MG/DL — SIGNIFICANT CHANGE UP (ref 70–99)
GLUCOSE BLDC GLUCOMTR-MCNC: 97 MG/DL — SIGNIFICANT CHANGE UP (ref 70–99)

## 2023-09-29 PROCEDURE — 99253 IP/OBS CNSLTJ NEW/EST LOW 45: CPT

## 2023-09-29 RX ADMIN — Medication 975 MILLIGRAM(S): at 06:25

## 2023-09-29 RX ADMIN — Medication 1 TABLET(S): at 21:41

## 2023-09-29 RX ADMIN — OXYCODONE HYDROCHLORIDE 10 MILLIGRAM(S): 5 TABLET ORAL at 21:51

## 2023-09-29 RX ADMIN — CELECOXIB 200 MILLIGRAM(S): 200 CAPSULE ORAL at 05:24

## 2023-09-29 RX ADMIN — GABAPENTIN 100 MILLIGRAM(S): 400 CAPSULE ORAL at 21:43

## 2023-09-29 RX ADMIN — Medication 1 TABLET(S): at 05:24

## 2023-09-29 RX ADMIN — SENNA PLUS 2 TABLET(S): 8.6 TABLET ORAL at 21:42

## 2023-09-29 RX ADMIN — Medication 975 MILLIGRAM(S): at 14:13

## 2023-09-29 RX ADMIN — OXYCODONE HYDROCHLORIDE 10 MILLIGRAM(S): 5 TABLET ORAL at 05:52

## 2023-09-29 RX ADMIN — Medication 1 TABLET(S): at 13:44

## 2023-09-29 RX ADMIN — POLYETHYLENE GLYCOL 3350 17 GRAM(S): 17 POWDER, FOR SOLUTION ORAL at 21:42

## 2023-09-29 RX ADMIN — OXYCODONE HYDROCHLORIDE 10 MILLIGRAM(S): 5 TABLET ORAL at 09:16

## 2023-09-29 RX ADMIN — CELECOXIB 200 MILLIGRAM(S): 200 CAPSULE ORAL at 06:24

## 2023-09-29 RX ADMIN — Medication 325 MILLIGRAM(S): at 17:39

## 2023-09-29 RX ADMIN — LOSARTAN POTASSIUM 100 MILLIGRAM(S): 100 TABLET, FILM COATED ORAL at 05:24

## 2023-09-29 RX ADMIN — Medication 975 MILLIGRAM(S): at 05:25

## 2023-09-29 RX ADMIN — Medication 975 MILLIGRAM(S): at 21:40

## 2023-09-29 RX ADMIN — CELECOXIB 200 MILLIGRAM(S): 200 CAPSULE ORAL at 18:09

## 2023-09-29 RX ADMIN — OXYCODONE HYDROCHLORIDE 10 MILLIGRAM(S): 5 TABLET ORAL at 12:59

## 2023-09-29 RX ADMIN — GABAPENTIN 100 MILLIGRAM(S): 400 CAPSULE ORAL at 13:44

## 2023-09-29 RX ADMIN — ATORVASTATIN CALCIUM 10 MILLIGRAM(S): 80 TABLET, FILM COATED ORAL at 21:44

## 2023-09-29 RX ADMIN — Medication 975 MILLIGRAM(S): at 22:12

## 2023-09-29 RX ADMIN — GABAPENTIN 100 MILLIGRAM(S): 400 CAPSULE ORAL at 05:25

## 2023-09-29 RX ADMIN — Medication 1 TABLET(S): at 12:30

## 2023-09-29 RX ADMIN — OXYCODONE HYDROCHLORIDE 10 MILLIGRAM(S): 5 TABLET ORAL at 22:55

## 2023-09-29 RX ADMIN — Medication 975 MILLIGRAM(S): at 13:43

## 2023-09-29 RX ADMIN — Medication 325 MILLIGRAM(S): at 12:30

## 2023-09-29 RX ADMIN — PANTOPRAZOLE SODIUM 40 MILLIGRAM(S): 20 TABLET, DELAYED RELEASE ORAL at 05:24

## 2023-09-29 RX ADMIN — Medication 325 MILLIGRAM(S): at 05:25

## 2023-09-29 RX ADMIN — OXYCODONE HYDROCHLORIDE 10 MILLIGRAM(S): 5 TABLET ORAL at 06:52

## 2023-09-29 RX ADMIN — CELECOXIB 200 MILLIGRAM(S): 200 CAPSULE ORAL at 17:39

## 2023-09-29 RX ADMIN — OXYCODONE HYDROCHLORIDE 10 MILLIGRAM(S): 5 TABLET ORAL at 12:29

## 2023-09-29 RX ADMIN — OXYCODONE HYDROCHLORIDE 10 MILLIGRAM(S): 5 TABLET ORAL at 09:46

## 2023-09-29 NOTE — CONSULT NOTE ADULT - SUBJECTIVE AND OBJECTIVE BOX
HPI: This is a 64yo Female with PMH of HTN, HLD, DM and bilateral OA/left TKA 5/22/2023. Admitted for elective right TKA 2/2 OA.  s/p right Knee Arthroplasty, 9/27/23.      REVIEW OF SYSTEMS/Subjective: Patient in bed in NAD, no SOB, no n/v, (+)right knee pain.  Constitutional - No fever, No fatigue  HEENT - No neck pain  Respiratory - No cough, No shortness of breath  Cardiovascular - No chest pain  Gastrointestinal - No abdominal pain  All other review of systems negative    PAST MEDICAL & SURGICAL HISTORY  Primary osteoarthritis of right knee    Handoff    MEWS Score    HTN (hypertension)    HLD (hyperlipidemia)    Type 2 diabetes mellitus    Obesity    OA (osteoarthritis)    Right total knee arthroplasty    OA (osteoarthritis)    Right total knee arthroplasty    History of cholecystectomy    Status post total abdominal hysterectomy and bilateral salpingo-oophorectomy (REGGIE-BSO)    H/O cataract extraction    Status post excision of lipoma    H/O total knee replacement, left    [id number]        SOCIAL HISTORY    Smoking - Denied  EtOH - Denied   Drugs - Denied    FUNCTIONAL HISTORY  Lives with Dtr in a pvt house with 7-8 HONORIO, 1 flight to bedroom. (-)HHA  Independent in ambulation with RW, ADL's, transfers prior to hospitalization  Home equipment - shower chair, commode        FAMILY HISTORY   Reviewed and non-contributory    ALLERGIES  No Known Allergies    VITALS  T(C): 36.6 (09-29-23 @ 09:20)  T(F): 97.9 (09-29-23 @ 09:20), Max: 99.4 (09-28-23 @ 22:06)  HR: 65 (09-29-23 @ 09:20) (61 - 67)  BP: 144/55 (09-29-23 @ 09:20) (128/51 - 156/57)  RR:  (17 - 18)  SpO2:  (97% - 100%)  Wt(kg): --    PHYSICAL EXAM  Constitutional - NAD, Comfortable  HEENT - NCAT, MMM  Neck - Supple  Chest - CTA bilaterally  Cardiovascular - RRR, S1S2  Abdomen - BS+, Soft, NTND  Extremities - right knee dressed, no pedal edema   Neurologic Exam -                    Cognitive - Awake, Alert, Oriented to self, place, date, year, situation, follows 2SC        Motor -                     LEFT    UE - ShAB 5/5, EF 5/5, EE 5/5, WE 5/5,  5/5                    RIGHT UE - ShAB 5/5, EF 5/5, EE 5/5, WE 5/5,  5/5                    LEFT    LE - HF 4/5, KE 5/5, DF 5/5, PF 5/5                    RIGHT LE - HF/KE <3/5mp 2/2 pain, DF 5/5, PF 5/5        Sensory - Intact to light touch diffusely     Reflexes - DTR intact and symmetrical, Negative Babinski's bilaterally      Coordination - Finger-to-nose intact bilaterally   Psychiatric - Affect WNL    CURRENT FUNCTIONAL STATUS  Bed mobility - (A)  Transfers - UTD    RECENT LABS/IMAGING                        9.2    9.29  )-----------( 214      ( 28 Sep 2023 06:21 )             27.0     09-28    138  |  103  |  10  ----------------------------<  121<H>  3.5   |  26  |  0.66    Ca    8.4      28 Sep 2023 06:21        Urinalysis Basic - ( 28 Sep 2023 06:21 )    Color: x / Appearance: x / SG: x / pH: x  Gluc: 121 mg/dL / Ketone: x  / Bili: x / Urobili: x   Blood: x / Protein: x / Nitrite: x   Leuk Esterase: x / RBC: x / WBC x   Sq Epi: x / Non Sq Epi: x / Bacteria: x        HbA1C -   TSH -   CHL -   Tri -   HDL -   LDL -     MEDICATIONS   MEDICATIONS  (STANDING):  acetaminophen     Tablet .. 975 milliGRAM(s) Oral every 8 hours  aspirin enteric coated 325 milliGRAM(s) Oral two times a day  atorvastatin 10 milliGRAM(s) Oral at bedtime  calcium carbonate 1250 mG  + Vitamin D (OsCal 500 + D) 1 Tablet(s) Oral three times a day  celecoxib 200 milliGRAM(s) Oral two times a day  chlorhexidine 2% Cloths 1 Application(s) Topical daily  dextrose 5%. 1000 milliLiter(s) (100 mL/Hr) IV Continuous <Continuous>  dextrose 5%. 1000 milliLiter(s) (50 mL/Hr) IV Continuous <Continuous>  dextrose 50% Injectable 25 Gram(s) IV Push once  dextrose 50% Injectable 12.5 Gram(s) IV Push once  dextrose 50% Injectable 25 Gram(s) IV Push once  ferrous    sulfate 325 milliGRAM(s) Oral daily  gabapentin 100 milliGRAM(s) Oral three times a day  glucagon  Injectable 1 milliGRAM(s) IntraMuscular once  hydrochlorothiazide 12.5 milliGRAM(s) Oral daily  influenza   Vaccine 0.5 milliLiter(s) IntraMuscular once  insulin lispro (ADMELOG) corrective regimen sliding scale   SubCutaneous three times a day before meals  lactated ringers. 1000 milliLiter(s) (30 mL/Hr) IV Continuous <Continuous>  losartan 100 milliGRAM(s) Oral daily  multivitamin 1 Tablet(s) Oral daily  pantoprazole    Tablet 40 milliGRAM(s) Oral before breakfast  pantoprazole    Tablet 40 milliGRAM(s) Oral before breakfast  polyethylene glycol 3350 17 Gram(s) Oral at bedtime  senna 2 Tablet(s) Oral at bedtime  senna 2 Tablet(s) Oral at bedtime    MEDICATIONS  (PRN):  dextrose Oral Gel 15 Gram(s) Oral once PRN Blood Glucose LESS THAN 70 milliGRAM(s)/deciliter  magnesium hydroxide Suspension 30 milliLiter(s) Oral daily PRN Constipation  ondansetron Injectable 4 milliGRAM(s) IV Push every 6 hours PRN Nausea and/or Vomiting  oxyCODONE    IR 10 milliGRAM(s) Oral every 3 hours PRN Severe Pain (7 - 10)  oxyCODONE    IR 5 milliGRAM(s) Oral every 3 hours PRN Moderate Pain (4 - 6)  traMADol 50 milliGRAM(s) Oral every 8 hours PRN Mild Pain (1 - 3)      ASSESSMENT/PLAN  This is a 64yo Female with PMH of HTN, HLD, DM and bilateral OA/left TKA 5/22/2023. Admitted for elective right TKA 2/2 OA.  s/p right Knee Arthroplasty POD #2 with functional, gait, ADL impairments.    Disposition - At this point in time patient would benefit from restorative inpatient rehab, subacute unit. Follow progress with bedside PT.  PT - ROM, Bed mobility, Transfers, Ambulation with assistive device

## 2023-09-29 NOTE — PROGRESS NOTE ADULT - NS ATTEND AMEND GEN_ALL_CORE FT
Patient seen and examined on 9/29/2023. Gurpreet Spencer is a 63 year old female who is now status post Right Total Knee Arthroplasty. Patient is currently doing well. She has been ambulating well with PT. No acute events overnight. Patient is requesting discharge to rehab.    Physical Exam:  Dressing: Clean, Dry, Intact  Motor: Intact EHL/FHL/Tibialis Anterior/Gastrocnemius  Sensory: Intact Superficial Peroneal/Deep Peroneal/Saphenous/Sural/Tibial Nerves  Vascular: 2+ DP Pulse    Assessment/Plan:  Gurpreet Spencer is a 63 year old female who is now status post Right Total Knee Arthroplasty.    Plan:  -Right Lower Extremity: Weight Bearing as Tolerated  -PT/OT, Out of Bed  -Pain Control: Per Protocol  -DVT Prophylaxis: Aspirin 325mg twice per day for 6 weeks  -Antibiotics: Ancef 2g x 24 hours: Completed  -Disposition: Rehab
Patient seen and examined. Gurpreet Spencer is a 63 year old female who is now status post Right Total Knee Arthroplasty. Patient is currently doing well. She has been ambulating well with PT. No acute events overnight.    Physical Exam:  Dressing: Clean, Dry, Intact  Motor: Intact EHL/FHL/Tibialis Anterior/Gastrocnemius  Sensory: Intact Superficial Peroneal/Deep Peroneal/Saphenous/Sural/Tibial Nerves  Vascular: 2+ DP Pulse    Assessment/Plan:  Gurpreet Spencer is a 63 year old female who is now status post Right Total Knee Arthroplasty.    Plan:  -Right Lower Extremity: Weight Bearing as Tolerated  -PT/OT, Out of Bed  -Pain Control: Per Protocl  -DVT Prophylaxis: Aspirin 325mg twice per day for 6 weeks  -Antibiotics: Ancef 2g x 24 hours  -Disposition: Pending

## 2023-09-30 LAB
GLUCOSE BLDC GLUCOMTR-MCNC: 100 MG/DL — HIGH (ref 70–99)
GLUCOSE BLDC GLUCOMTR-MCNC: 101 MG/DL — HIGH (ref 70–99)
GLUCOSE BLDC GLUCOMTR-MCNC: 105 MG/DL — HIGH (ref 70–99)
GLUCOSE BLDC GLUCOMTR-MCNC: 137 MG/DL — HIGH (ref 70–99)

## 2023-09-30 RX ADMIN — Medication 1 TABLET(S): at 22:15

## 2023-09-30 RX ADMIN — Medication 975 MILLIGRAM(S): at 06:20

## 2023-09-30 RX ADMIN — CELECOXIB 200 MILLIGRAM(S): 200 CAPSULE ORAL at 17:21

## 2023-09-30 RX ADMIN — OXYCODONE HYDROCHLORIDE 10 MILLIGRAM(S): 5 TABLET ORAL at 10:25

## 2023-09-30 RX ADMIN — Medication 975 MILLIGRAM(S): at 23:50

## 2023-09-30 RX ADMIN — Medication 975 MILLIGRAM(S): at 22:11

## 2023-09-30 RX ADMIN — Medication 1 TABLET(S): at 11:40

## 2023-09-30 RX ADMIN — Medication 325 MILLIGRAM(S): at 17:21

## 2023-09-30 RX ADMIN — GABAPENTIN 100 MILLIGRAM(S): 400 CAPSULE ORAL at 13:58

## 2023-09-30 RX ADMIN — Medication 325 MILLIGRAM(S): at 11:40

## 2023-09-30 RX ADMIN — Medication 1 TABLET(S): at 05:13

## 2023-09-30 RX ADMIN — Medication 1 TABLET(S): at 13:58

## 2023-09-30 RX ADMIN — PANTOPRAZOLE SODIUM 40 MILLIGRAM(S): 20 TABLET, DELAYED RELEASE ORAL at 05:13

## 2023-09-30 RX ADMIN — GABAPENTIN 100 MILLIGRAM(S): 400 CAPSULE ORAL at 22:13

## 2023-09-30 RX ADMIN — Medication 975 MILLIGRAM(S): at 13:58

## 2023-09-30 RX ADMIN — Medication 975 MILLIGRAM(S): at 05:12

## 2023-09-30 RX ADMIN — CELECOXIB 200 MILLIGRAM(S): 200 CAPSULE ORAL at 05:12

## 2023-09-30 RX ADMIN — ATORVASTATIN CALCIUM 10 MILLIGRAM(S): 80 TABLET, FILM COATED ORAL at 22:13

## 2023-09-30 RX ADMIN — GABAPENTIN 100 MILLIGRAM(S): 400 CAPSULE ORAL at 05:12

## 2023-09-30 RX ADMIN — POLYETHYLENE GLYCOL 3350 17 GRAM(S): 17 POWDER, FOR SOLUTION ORAL at 22:12

## 2023-09-30 RX ADMIN — CELECOXIB 200 MILLIGRAM(S): 200 CAPSULE ORAL at 06:20

## 2023-09-30 RX ADMIN — LOSARTAN POTASSIUM 100 MILLIGRAM(S): 100 TABLET, FILM COATED ORAL at 05:13

## 2023-09-30 RX ADMIN — SENNA PLUS 2 TABLET(S): 8.6 TABLET ORAL at 22:12

## 2023-09-30 RX ADMIN — Medication 325 MILLIGRAM(S): at 05:12

## 2023-09-30 RX ADMIN — OXYCODONE HYDROCHLORIDE 10 MILLIGRAM(S): 5 TABLET ORAL at 09:29

## 2023-09-30 RX ADMIN — CHLORHEXIDINE GLUCONATE 1 APPLICATION(S): 213 SOLUTION TOPICAL at 11:40

## 2023-10-01 LAB
GLUCOSE BLDC GLUCOMTR-MCNC: 103 MG/DL — HIGH (ref 70–99)
GLUCOSE BLDC GLUCOMTR-MCNC: 117 MG/DL — HIGH (ref 70–99)
GLUCOSE BLDC GLUCOMTR-MCNC: 132 MG/DL — HIGH (ref 70–99)
GLUCOSE BLDC GLUCOMTR-MCNC: 99 MG/DL — SIGNIFICANT CHANGE UP (ref 70–99)

## 2023-10-01 RX ADMIN — OXYCODONE HYDROCHLORIDE 10 MILLIGRAM(S): 5 TABLET ORAL at 06:52

## 2023-10-01 RX ADMIN — TRAMADOL HYDROCHLORIDE 50 MILLIGRAM(S): 50 TABLET ORAL at 22:10

## 2023-10-01 RX ADMIN — Medication 1 TABLET(S): at 05:02

## 2023-10-01 RX ADMIN — Medication 325 MILLIGRAM(S): at 05:01

## 2023-10-01 RX ADMIN — GABAPENTIN 100 MILLIGRAM(S): 400 CAPSULE ORAL at 05:01

## 2023-10-01 RX ADMIN — CELECOXIB 200 MILLIGRAM(S): 200 CAPSULE ORAL at 05:00

## 2023-10-01 RX ADMIN — Medication 325 MILLIGRAM(S): at 17:37

## 2023-10-01 RX ADMIN — Medication 1 TABLET(S): at 11:29

## 2023-10-01 RX ADMIN — Medication 1 TABLET(S): at 22:06

## 2023-10-01 RX ADMIN — OXYCODONE HYDROCHLORIDE 10 MILLIGRAM(S): 5 TABLET ORAL at 07:29

## 2023-10-01 RX ADMIN — LOSARTAN POTASSIUM 100 MILLIGRAM(S): 100 TABLET, FILM COATED ORAL at 05:02

## 2023-10-01 RX ADMIN — Medication 975 MILLIGRAM(S): at 06:28

## 2023-10-01 RX ADMIN — CELECOXIB 200 MILLIGRAM(S): 200 CAPSULE ORAL at 06:00

## 2023-10-01 RX ADMIN — Medication 975 MILLIGRAM(S): at 22:40

## 2023-10-01 RX ADMIN — Medication 1 TABLET(S): at 13:11

## 2023-10-01 RX ADMIN — TRAMADOL HYDROCHLORIDE 50 MILLIGRAM(S): 50 TABLET ORAL at 22:40

## 2023-10-01 RX ADMIN — Medication 975 MILLIGRAM(S): at 05:00

## 2023-10-01 RX ADMIN — CELECOXIB 200 MILLIGRAM(S): 200 CAPSULE ORAL at 17:37

## 2023-10-01 RX ADMIN — Medication 975 MILLIGRAM(S): at 13:41

## 2023-10-01 RX ADMIN — GABAPENTIN 100 MILLIGRAM(S): 400 CAPSULE ORAL at 22:06

## 2023-10-01 RX ADMIN — Medication 975 MILLIGRAM(S): at 13:11

## 2023-10-01 RX ADMIN — GABAPENTIN 100 MILLIGRAM(S): 400 CAPSULE ORAL at 13:12

## 2023-10-01 RX ADMIN — POLYETHYLENE GLYCOL 3350 17 GRAM(S): 17 POWDER, FOR SOLUTION ORAL at 22:13

## 2023-10-01 RX ADMIN — OXYCODONE HYDROCHLORIDE 10 MILLIGRAM(S): 5 TABLET ORAL at 11:29

## 2023-10-01 RX ADMIN — Medication 975 MILLIGRAM(S): at 22:06

## 2023-10-01 RX ADMIN — Medication 325 MILLIGRAM(S): at 11:29

## 2023-10-01 RX ADMIN — SENNA PLUS 2 TABLET(S): 8.6 TABLET ORAL at 22:14

## 2023-10-01 RX ADMIN — PANTOPRAZOLE SODIUM 40 MILLIGRAM(S): 20 TABLET, DELAYED RELEASE ORAL at 05:07

## 2023-10-01 RX ADMIN — ATORVASTATIN CALCIUM 10 MILLIGRAM(S): 80 TABLET, FILM COATED ORAL at 22:07

## 2023-10-01 RX ADMIN — CELECOXIB 200 MILLIGRAM(S): 200 CAPSULE ORAL at 18:07

## 2023-10-01 RX ADMIN — OXYCODONE HYDROCHLORIDE 10 MILLIGRAM(S): 5 TABLET ORAL at 11:59

## 2023-10-02 LAB
GLUCOSE BLDC GLUCOMTR-MCNC: 121 MG/DL — HIGH (ref 70–99)
GLUCOSE BLDC GLUCOMTR-MCNC: 121 MG/DL — HIGH (ref 70–99)
GLUCOSE BLDC GLUCOMTR-MCNC: 122 MG/DL — HIGH (ref 70–99)
GLUCOSE BLDC GLUCOMTR-MCNC: 98 MG/DL — SIGNIFICANT CHANGE UP (ref 70–99)
SARS-COV-2 RNA SPEC QL NAA+PROBE: SIGNIFICANT CHANGE UP

## 2023-10-02 RX ADMIN — ATORVASTATIN CALCIUM 10 MILLIGRAM(S): 80 TABLET, FILM COATED ORAL at 21:21

## 2023-10-02 RX ADMIN — Medication 325 MILLIGRAM(S): at 11:40

## 2023-10-02 RX ADMIN — Medication 1 TABLET(S): at 11:40

## 2023-10-02 RX ADMIN — GABAPENTIN 100 MILLIGRAM(S): 400 CAPSULE ORAL at 21:21

## 2023-10-02 RX ADMIN — Medication 975 MILLIGRAM(S): at 13:43

## 2023-10-02 RX ADMIN — Medication 1 TABLET(S): at 13:43

## 2023-10-02 RX ADMIN — Medication 975 MILLIGRAM(S): at 21:20

## 2023-10-02 RX ADMIN — LOSARTAN POTASSIUM 100 MILLIGRAM(S): 100 TABLET, FILM COATED ORAL at 06:01

## 2023-10-02 RX ADMIN — Medication 975 MILLIGRAM(S): at 06:02

## 2023-10-02 RX ADMIN — Medication 1 TABLET(S): at 21:21

## 2023-10-02 RX ADMIN — GABAPENTIN 100 MILLIGRAM(S): 400 CAPSULE ORAL at 06:01

## 2023-10-02 RX ADMIN — CELECOXIB 200 MILLIGRAM(S): 200 CAPSULE ORAL at 06:01

## 2023-10-02 RX ADMIN — OXYCODONE HYDROCHLORIDE 10 MILLIGRAM(S): 5 TABLET ORAL at 12:30

## 2023-10-02 RX ADMIN — POLYETHYLENE GLYCOL 3350 17 GRAM(S): 17 POWDER, FOR SOLUTION ORAL at 21:21

## 2023-10-02 RX ADMIN — Medication 975 MILLIGRAM(S): at 14:13

## 2023-10-02 RX ADMIN — Medication 325 MILLIGRAM(S): at 06:02

## 2023-10-02 RX ADMIN — PANTOPRAZOLE SODIUM 40 MILLIGRAM(S): 20 TABLET, DELAYED RELEASE ORAL at 07:22

## 2023-10-02 RX ADMIN — Medication 1 TABLET(S): at 06:01

## 2023-10-02 RX ADMIN — SENNA PLUS 2 TABLET(S): 8.6 TABLET ORAL at 21:21

## 2023-10-02 RX ADMIN — OXYCODONE HYDROCHLORIDE 10 MILLIGRAM(S): 5 TABLET ORAL at 11:39

## 2023-10-02 RX ADMIN — Medication 325 MILLIGRAM(S): at 17:51

## 2023-10-02 RX ADMIN — GABAPENTIN 100 MILLIGRAM(S): 400 CAPSULE ORAL at 13:43

## 2023-10-02 RX ADMIN — CELECOXIB 200 MILLIGRAM(S): 200 CAPSULE ORAL at 17:51

## 2023-10-02 RX ADMIN — Medication 975 MILLIGRAM(S): at 06:01

## 2023-10-02 NOTE — CHART NOTE - NSCHARTNOTEFT_GEN_A_CORE
Due to the nature of this patient's injuries s/p Right Total Knee Arthroplasty, ratient will require a 3-in-1 commode. She will be refined to one level of the home without access to a restroom.     Kenyatta Cook PA-C  #[id number]

## 2023-10-02 NOTE — PROGRESS NOTE ADULT - ATTENDING COMMENTS
Patient seen and examined. Gurpreet Spencer is a 63 year old female who is now status post Right Total Knee Arthroplasty. Patient is currently doing well. She has been ambulating well with PT. No acute events overnight. Patient is requesting discharge to rehab.    Physical Exam:  Dressing: Clean, Dry, Intact  Motor: Intact EHL/FHL/Tibialis Anterior/Gastrocnemius  Sensory: Intact Superficial Peroneal/Deep Peroneal/Saphenous/Sural/Tibial Nerves  Vascular: 2+ DP Pulse    Assessment/Plan:  Gurpreet Spencer is a 63 year old female who is now status post Right Total Knee Arthroplasty.    Plan:  -Right Lower Extremity: Weight Bearing as Tolerated  -PT/OT, Out of Bed  -Pain Control: Per Protocol  -DVT Prophylaxis: Aspirin 325mg twice per day for 6 weeks  -Antibiotics: Ancef 2g x 24 hours: Completed  -Disposition: Rehab

## 2023-10-03 VITALS
DIASTOLIC BLOOD PRESSURE: 68 MMHG | OXYGEN SATURATION: 100 % | SYSTOLIC BLOOD PRESSURE: 148 MMHG | HEART RATE: 68 BPM | RESPIRATION RATE: 18 BRPM | TEMPERATURE: 98 F

## 2023-10-03 LAB
GLUCOSE BLDC GLUCOMTR-MCNC: 104 MG/DL — HIGH (ref 70–99)
GLUCOSE BLDC GLUCOMTR-MCNC: 126 MG/DL — HIGH (ref 70–99)

## 2023-10-03 RX ORDER — ASPIRIN/CALCIUM CARB/MAGNESIUM 324 MG
1 TABLET ORAL
Qty: 70 | Refills: 0
Start: 2023-10-03 | End: 2023-11-06

## 2023-10-03 RX ORDER — TRAMADOL HYDROCHLORIDE 50 MG/1
1 TABLET ORAL
Qty: 15 | Refills: 0
Start: 2023-10-03 | End: 2023-10-07

## 2023-10-03 RX ORDER — DOCUSATE SODIUM 100 MG
1 CAPSULE ORAL
Refills: 0 | DISCHARGE

## 2023-10-03 RX ORDER — NALOXONE HYDROCHLORIDE 4 MG/.1ML
4 SPRAY NASAL
Qty: 1 | Refills: 0
Start: 2023-10-03 | End: 2023-10-03

## 2023-10-03 RX ORDER — ACETAMINOPHEN 500 MG
3 TABLET ORAL
Qty: 0 | Refills: 0 | DISCHARGE
Start: 2023-10-03

## 2023-10-03 RX ORDER — SENNA PLUS 8.6 MG/1
2 TABLET ORAL
Qty: 0 | Refills: 0 | DISCHARGE
Start: 2023-10-03

## 2023-10-03 RX ORDER — OXYCODONE HYDROCHLORIDE 5 MG/1
1 TABLET ORAL
Qty: 42 | Refills: 0
Start: 2023-10-03 | End: 2023-10-09

## 2023-10-03 RX ORDER — LANSOPRAZOLE 15 MG/1
1 CAPSULE, DELAYED RELEASE ORAL
Refills: 0 | DISCHARGE

## 2023-10-03 RX ORDER — PANTOPRAZOLE SODIUM 20 MG/1
1 TABLET, DELAYED RELEASE ORAL
Qty: 30 | Refills: 0
Start: 2023-10-03 | End: 2023-11-01

## 2023-10-03 RX ORDER — POLYETHYLENE GLYCOL 3350 17 G/17G
17 POWDER, FOR SOLUTION ORAL
Qty: 0 | Refills: 0 | DISCHARGE
Start: 2023-10-03

## 2023-10-03 RX ORDER — CELECOXIB 200 MG/1
1 CAPSULE ORAL
Qty: 28 | Refills: 0
Start: 2023-10-03 | End: 2023-10-16

## 2023-10-03 RX ORDER — ASPIRIN/CALCIUM CARB/MAGNESIUM 324 MG
1 TABLET ORAL
Refills: 0 | DISCHARGE

## 2023-10-03 RX ADMIN — OXYCODONE HYDROCHLORIDE 10 MILLIGRAM(S): 5 TABLET ORAL at 12:31

## 2023-10-03 RX ADMIN — Medication 1 TABLET(S): at 06:07

## 2023-10-03 RX ADMIN — TRAMADOL HYDROCHLORIDE 50 MILLIGRAM(S): 50 TABLET ORAL at 06:35

## 2023-10-03 RX ADMIN — OXYCODONE HYDROCHLORIDE 10 MILLIGRAM(S): 5 TABLET ORAL at 15:52

## 2023-10-03 RX ADMIN — Medication 975 MILLIGRAM(S): at 06:07

## 2023-10-03 RX ADMIN — PANTOPRAZOLE SODIUM 40 MILLIGRAM(S): 20 TABLET, DELAYED RELEASE ORAL at 06:08

## 2023-10-03 RX ADMIN — Medication 325 MILLIGRAM(S): at 11:31

## 2023-10-03 RX ADMIN — Medication 975 MILLIGRAM(S): at 15:52

## 2023-10-03 RX ADMIN — INFLUENZA VIRUS VACCINE 0.5 MILLILITER(S): 15; 15; 15; 15 SUSPENSION INTRAMUSCULAR at 12:30

## 2023-10-03 RX ADMIN — GABAPENTIN 100 MILLIGRAM(S): 400 CAPSULE ORAL at 14:52

## 2023-10-03 RX ADMIN — GABAPENTIN 100 MILLIGRAM(S): 400 CAPSULE ORAL at 06:08

## 2023-10-03 RX ADMIN — OXYCODONE HYDROCHLORIDE 10 MILLIGRAM(S): 5 TABLET ORAL at 11:31

## 2023-10-03 RX ADMIN — Medication 975 MILLIGRAM(S): at 06:35

## 2023-10-03 RX ADMIN — OXYCODONE HYDROCHLORIDE 10 MILLIGRAM(S): 5 TABLET ORAL at 14:56

## 2023-10-03 RX ADMIN — TRAMADOL HYDROCHLORIDE 50 MILLIGRAM(S): 50 TABLET ORAL at 06:07

## 2023-10-03 RX ADMIN — Medication 1 TABLET(S): at 11:31

## 2023-10-03 RX ADMIN — LOSARTAN POTASSIUM 100 MILLIGRAM(S): 100 TABLET, FILM COATED ORAL at 06:08

## 2023-10-03 RX ADMIN — Medication 975 MILLIGRAM(S): at 14:52

## 2023-10-03 RX ADMIN — Medication 325 MILLIGRAM(S): at 06:08

## 2023-10-03 RX ADMIN — Medication 1 TABLET(S): at 14:52

## 2023-10-03 NOTE — PROGRESS NOTE ADULT - SUBJECTIVE AND OBJECTIVE BOX
ORTHO PROGRESS NOTE     Pt seen and examined at bedside, denies SOB, CP, Dizziness. N/V/D /HA.  No significant overnight events. Pain well controlled. Patient ambulated  with PT.     Vital Signs Last 24 Hrs  T(C): 36.6 (28 Sep 2023 05:18), Max: 36.7 (27 Sep 2023 21:00)  T(F): 97.8 (28 Sep 2023 05:18), Max: 98 (27 Sep 2023 21:00)  HR: 58 (28 Sep 2023 05:18) (58 - 78)  BP: 131/54 (28 Sep 2023 05:18) (131/54 - 186/80)  BP(mean): 82 (27 Sep 2023 22:30) (82 - 107)  RR: 19 (28 Sep 2023 05:18) (14 - 20)  SpO2: 98% (28 Sep 2023 05:18) (95% - 100%)    Parameters below as of 28 Sep 2023 05:18  Patient On (Oxygen Delivery Method): room air        Gen: No apparent distress    right LE:  Dressing C/D I    BLE: motor intact EHL/FHL/TA/GS .  Sensation is grossly intact to light touch in the distal extremities.  Compartments are soft bilaterally.  Extremities are warm .  DP 2+ BLE     Labs: AM        A/P  s/p right  Knee Arthroplasty, POD #1    - Pain control/ Analgesia  - DVT ppx  bid , foot pumps  - PT/OT - wbat/oob    - Incentive Spirometer  - Rehab planning    - notify Ortho for questions 
ORTHO PROGRESS NOTE     Pt seen and examined at bedside, denies SOB, CP, Dizziness. N/V/D /HA.  No significant overnight events. Pain well controlled. Patient ambulated  with PT     Vital Signs Last 24 Hrs  T(C): 36.6 (29 Sep 2023 05:30), Max: 37.4 (28 Sep 2023 18:13)  T(F): 97.9 (29 Sep 2023 05:30), Max: 99.4 (28 Sep 2023 22:06)  HR: 67 (29 Sep 2023 05:30) (57 - 67)  BP: 156/57 (29 Sep 2023 05:30) (128/51 - 156/57)  BP(mean): --  RR: 18 (29 Sep 2023 05:30) (17 - 18)  SpO2: 98% (29 Sep 2023 05:30) (97% - 100%)    Parameters below as of 29 Sep 2023 05:30  Patient On (Oxygen Delivery Method): room air        Gen: No apparent distress    right LE:  Dressing C/D I  Ice wrap in place  BLE: motor intact EHL/FHL/TA/GS .  Sensation is grossly intact to light touch in the distal extremities.  Compartments are soft bilaterally.  Extremities are warm .  DP 2+ BLE     Labs:  CBC Full  -  ( 28 Sep 2023 06:21 )  WBC Count : 9.29 K/uL  RBC Count : 3.29 M/uL  Hemoglobin : 9.2 g/dL  Hematocrit : 27.0 %  Platelet Count - Automated : 214 K/uL  Mean Cell Volume : 82.1 fL  Mean Cell Hemoglobin : 28.0 pg  Mean Cell Hemoglobin Concentration : 34.1 gm/dL  Auto Neutrophil # : x  Auto Lymphocyte # : x  Auto Monocyte # : x  Auto Eosinophil # : x  Auto Basophil # : x  Auto Neutrophil % : x  Auto Lymphocyte % : x  Auto Monocyte % : x  Auto Eosinophil % : x  Auto Basophil % : x        09-28    138  |  103  |  10  ----------------------------<  121<H>  3.5   |  26  |  0.66    Ca    8.4      28 Sep 2023 06:21           A/P  s/p right Knee Arthroplasty, POD #2    - Pain control/ Analgesia  - DVT ppx ASA bid, foot pumps  - PT/OT - wbat/oob    - Incentive Spirometer  - Rehab planning   - notify Ortho for questions 
ORTHO PROGRESS NOTE     Pt seen and examined at bedside, denies SOB, CP, Dizziness. N/V/D /HA.  No significant overnight events. Pain well controlled. Patient ambulated  with PT     Vital Signs Last 24 Hrs  T(C): 36.7 (30 Sep 2023 05:11), Max: 37.6 (29 Sep 2023 21:38)  T(F): 98.1 (30 Sep 2023 05:11), Max: 99.7 (29 Sep 2023 21:38)  HR: 66 (30 Sep 2023 05:11) (65 - 69)  BP: 125/56 (30 Sep 2023 05:11) (125/56 - 144/55)  BP(mean): --  RR: 18 (30 Sep 2023 05:11) (16 - 18)  SpO2: 99% (30 Sep 2023 05:11) (94% - 99%)    Parameters below as of 30 Sep 2023 05:11  Patient On (Oxygen Delivery Method): room air        Gen: No apparent distress    right LE:  Dressing C/D I  Ice wrap in place  BLE: motor intact EHL/FHL/TA/GS .  Sensation is grossly intact to light touch in the distal extremities.  Compartments are soft bilaterally.  Extremities are warm .  DP 2+ BLE               A/P  s/p right Knee Arthroplasty, POD #2    - Pain control/ Analgesia  - DVT ppx ASA bid, foot pumps  - PT/OT - wbat/oob    - Incentive Spirometer  - Rehab planning   - notify Ortho for questions   
Orthopaedic Surgery Progress Note    Subjective:   Patient seen and examined. o acute events overnight. Pain well controlled on current regimen.     Objective:  T(C): 36.6 (10-02-23 @ 06:00), Max: 36.9 (10-01-23 @ 17:02)  HR: 66 (10-02-23 @ 06:00) (62 - 70)  BP: 112/89 (10-02-23 @ 06:00) (112/89 - 138/47)  RR: 18 (10-02-23 @ 06:00) (17 - 18)  SpO2: 98% (10-02-23 @ 06:00) (98% - 100%)  Wt(kg): --    09-30 @ 07:01  -  10-01 @ 07:00  --------------------------------------------------------  IN: 800 mL / OUT: 0 mL / NET: 800 mL        PE    NAD  RLE:   dressing C/D/I, cold compress in place  motor intact GS/TA/EHL  SILT S/S/SP/DP  WWP, 2+ DP          63y Female s/p TKA 9/27  - Pain control  - WBAT  - PT/OT/OOB  - DVT ppx:  BID  - Dispo planning: [facility]
Orthopaedic Surgery Progress Note    Subjective:   Patient seen and examined. o acute events overnight. Pain well controlled on current regimen.     Objective:  Vital Signs Last 24 Hrs  T(C): 36.6 (02 Oct 2023 22:04), Max: 36.8 (02 Oct 2023 09:42)  T(F): 97.9 (02 Oct 2023 22:04), Max: 98.2 (02 Oct 2023 09:42)  HR: 61 (02 Oct 2023 22:04) (61 - 65)  BP: 145/60 (02 Oct 2023 22:04) (135/63 - 148/61)  BP(mean): --  RR: 17 (02 Oct 2023 22:04) (17 - 18)  SpO2: 100% (02 Oct 2023 22:04) (98% - 100%)    Parameters below as of 02 Oct 2023 22:04  Patient On (Oxygen Delivery Method): room air        PE    NAD  RLE:   dressing C/D/I, cold compress in place  motor intact GS/TA/EHL  SILT S/S/SP/DP  WWP, 2+ DP          63y Female s/p TKA 9/27, doing well  - Pain control  - WBAT  - PT/OT/OOB  - DVT ppx:  BID  - Dispo planning: [facility]
Pt seen/examined. Doing well. Pain controlled. No acute overnight complaints or events.    T(C): 36.6 (10-01-23 @ 01:50), Max: 36.9 (09-30-23 @ 20:48)  HR: 69 (10-01-23 @ 01:50) (66 - 71)  BP: 150/58 (10-01-23 @ 01:50) (125/56 - 150/58)  RR: 16 (10-01-23 @ 01:50) (16 - 18)  SpO2: 98% (10-01-23 @ 01:50) (98% - 100%)  Wt(kg): --  - Gen: NAD    Gen: No apparent distress    right LE:  Dressing C/D I  Ice wrap in place  BLE: motor intact EHL/FHL/TA/GS .  Sensation is grossly intact to light touch in the distal extremities.  Compartments are soft bilaterally.  Extremities are warm .  DP 2+ BLE     A/P  s/p right Knee Arthroplasty, POD #3    - Pain control/ Analgesia  - DVT ppx ASA bid, foot pumps  - PT/OT - wbat/oob    - Incentive Spirometer  - Rehab planning   - notify Ortho for questions 
Orthopedics Post-Op Check:  Patient was seen and examined at bedside. Denies CP/SOB/Dizziness, N/V/D, HA. Pain is controlled.     Vital Signs Last 24 Hrs  T(C): 36.5 (27 Sep 2023 17:45), Max: 36.5 (27 Sep 2023 12:38)  T(F): 97.7 (27 Sep 2023 17:45), Max: 97.7 (27 Sep 2023 12:38)  HR: 62 (27 Sep 2023 18:45) (60 - 66)  BP: 161/70 (27 Sep 2023 18:30) (152/66 - 186/80)  BP(mean): 89 (27 Sep 2023 18:30) (86 - 92)  RR: 16 (27 Sep 2023 18:45) (14 - 20)  SpO2: 98% (27 Sep 2023 18:45) (98% - 100%)    Parameters below as of 27 Sep 2023 18:45  Patient On (Oxygen Delivery Method): room air    Labs: FU AM    Physical Exam:  Gen: NAD  RLE:   Dressing C/D/I  Motor intact + EHL/FHL/TA/GS. Sensation is grossly intact.   Compartments are soft, extremities are warm, DP 2+

## 2023-10-13 ENCOUNTER — APPOINTMENT (OUTPATIENT)
Dept: ORTHOPEDIC SURGERY | Facility: CLINIC | Age: 63
End: 2023-10-13
Payer: MEDICAID

## 2023-10-13 VITALS
HEART RATE: 73 BPM | HEIGHT: 65 IN | SYSTOLIC BLOOD PRESSURE: 140 MMHG | DIASTOLIC BLOOD PRESSURE: 72 MMHG | WEIGHT: 185 LBS | BODY MASS INDEX: 30.82 KG/M2

## 2023-10-13 PROCEDURE — 73562 X-RAY EXAM OF KNEE 3: CPT | Mod: RT

## 2023-10-13 PROCEDURE — 99213 OFFICE O/P EST LOW 20 MIN: CPT

## 2023-10-13 RX ORDER — CELECOXIB 200 MG/1
200 CAPSULE ORAL DAILY
Qty: 14 | Refills: 1 | Status: ACTIVE | COMMUNITY
Start: 2023-10-13 | End: 1900-01-01

## 2023-10-13 RX ORDER — TRAMADOL HYDROCHLORIDE 50 MG/1
50 TABLET, COATED ORAL TWICE DAILY
Qty: 14 | Refills: 0 | Status: ACTIVE | COMMUNITY
Start: 2023-10-13 | End: 1900-01-01

## 2023-10-24 ENCOUNTER — NON-APPOINTMENT (OUTPATIENT)
Age: 63
End: 2023-10-24

## 2023-10-24 ENCOUNTER — APPOINTMENT (OUTPATIENT)
Dept: ORTHOPEDIC SURGERY | Facility: CLINIC | Age: 63
End: 2023-10-24
Payer: MEDICAID

## 2023-10-24 PROCEDURE — 99213 OFFICE O/P EST LOW 20 MIN: CPT

## 2023-10-24 PROCEDURE — 73562 X-RAY EXAM OF KNEE 3: CPT | Mod: RT

## 2023-10-24 RX ORDER — CELECOXIB 200 MG/1
200 CAPSULE ORAL DAILY
Qty: 14 | Refills: 1 | Status: ACTIVE | COMMUNITY
Start: 2023-10-24 | End: 1900-01-01

## 2023-10-24 RX ORDER — TRAMADOL HYDROCHLORIDE 50 MG/1
50 TABLET, COATED ORAL TWICE DAILY
Qty: 10 | Refills: 0 | Status: ACTIVE | COMMUNITY
Start: 2023-10-24 | End: 1900-01-01

## 2024-01-01 ENCOUNTER — NON-APPOINTMENT (OUTPATIENT)
Age: 64
End: 2024-01-01

## 2024-01-09 ENCOUNTER — APPOINTMENT (OUTPATIENT)
Dept: ORTHOPEDIC SURGERY | Facility: CLINIC | Age: 64
End: 2024-01-09
Payer: MEDICAID

## 2024-01-09 DIAGNOSIS — Z96.659 PRESENCE OF UNSPECIFIED ARTIFICIAL KNEE JOINT: ICD-10-CM

## 2024-01-09 PROCEDURE — 73562 X-RAY EXAM OF KNEE 3: CPT | Mod: 50

## 2024-01-09 PROCEDURE — 99213 OFFICE O/P EST LOW 20 MIN: CPT

## 2024-01-14 PROBLEM — Z96.659 S/P TOTAL KNEE ARTHROPLASTY: Status: ACTIVE | Noted: 2023-06-05

## 2024-01-14 NOTE — HISTORY OF PRESENT ILLNESS
[de-identified] : 1/9/2024 Translated by Daughter  Teresastella Tj presents to the office for follow-up of a right total knee arthroplasty.  Patient underwent right TKA on 9/27/2023.  She is doing well overall.  Patient has been experiencing some right lateral knee pain.  She states that the pain has been improving.  Patient is planning on going to [facility].  No falls.  No fevers or chills.  10/24/2023 Translated by Daughter  Gurpreet Spencer presents to the office for follow-up of her right total knee arthroplasty.  Patient underwent right TKA on 9/27/2023.  She is doing well overall.  Her pain has been improving.  Patient has been doing well with outpatient physical therapy.  She has been taking aspirin for her DVT prophylaxis.  No fevers or chills.  10/13/2023 Translated by Shahid Vázquez Spencer presents to the office for follow-up of her right total knee arthroplasty.  Patient underwent right TKA on 9/27/2023.  She is currently doing well overall.  She does have some right knee pain.  She has been in outpatient physical therapy and is doing well.  Patient has been taking her aspirin for her DVT prophylaxis.  No fevers or chills.  9/18/2023 [facility]  ID: [id number]  Gurpreet Spencer presents to the office for evaluation of her right knee pain.  Patient has a longstanding history of right knee osteoarthritis and right knee pain.  Patient has been having knee pain for about 10 years.  Pain is worse with ambulation and walking.  She states that the right knee pain is now affecting her quality of life.  She has tried multiple corticosteroid injections, last in January 2023.  She has tried physical therapy, home exercises, and anti-inflammatories.  Patient states that her left total knee arthroplasty is currently doing well.  Patient would like to discuss right total knee arthroplasty at this time.  8/1/2023 Translated by Daughter in   Gurpreet Grierel presents to the office for follow-up of her left total knee arthroplasty.  Patient underwent left TKA on 5/22/2023.  She is currently doing well. Patient's pain is currently well controlled. She is walking well without assistive devices.  No falls or injuries.  No fevers or chills.  She finished her aspirin for her DVT prophylaxis.   6/22/2023 Translated by Daughter in Law Gurpreet Spencer presents to the office for follow-up of her left total knee arthroplasty.  Patient underwent left TKA on 5/22/2023.  She is currently doing well.  Patient does not typically have any knee pain, but has some pain after physical therapy.  She is currently using a cane.  She uses tramadol after physical therapy.  No falls or injuries.  No fevers or chills.  She finished her aspirin for her DVT prophylaxis.  6/6/2023 Translated by Daughter in Law Gurpreet Spencer is a 62-year-old female presenting to the office for follow-up of her left total knee arthroplasty.  Patient underwent left TKA on 5/22/2023.  Patient is currently doing well.  She was discharged to rehab, but has since been discharged home.  Patient was doing physical therapy for 3 hours/day while in rehab and doing well.  She has not yet started physical therapy as an outpatient.  She is currently walking with a walker.  Patient's pain is currently well controlled with pain medications.  She is currently taking her Aspirin 325 mg twice per day for DVT prophylaxis.  5/19/2023 Translated by Daughter in  [place]  ID: [id number]  Ms. Spencer is a 62-year-old female presents to the office for evaluation of her bilateral knee pain.  Patient has been experiencing bilateral (left greater than right) knee pain for many years (about 10 years), but has worsened over the last 2 years.  Pain is worse when getting up from a seated position.  Patient can walk about 1 block before experiencing pain and she has difficulty walking.  The pain is now affecting her quality of life.  She has tried 3-4 corticosteroid injections in her bilateral knees, which lasted about 3 months.  Her last injection was on 1/4/2023, which lasted about 2 to 3 months.  She has tried physical therapy and home exercises, with some relief, but not significant.  She has tried Celebrex, Tylenol, and Motrin, with some relief, but now medications are not significantly helping.  No hip or groin pain.  No back pain.  Patient does have some coccygeal pain that is worse with sitting.  History: HTN, Diabetes (Last A1C: 5.7-5.9), HLD

## 2024-01-14 NOTE — DISCUSSION/SUMMARY
[de-identified] : Gurpreet Spencer is a 63-year-old female who presents to the office for follow-up of her right total knee arthroplasty.  Patient underwent right TKA on 9/27/2023.  She is doing well overall.  X-rays showed right total knee arthroplasty in good position and alignment.  Examination showed range of motion 0 to 115 degrees.  Discussed with patient the examination and imaging findings.  Discussed with patient the management of her right total knee arthroplasty at this time, including physical therapy.  Patient will continue her outpatient physical therapy.  She will participate in activities as tolerated.  Patient will follow-up in 3 months for reevaluation and management.  Patient understanding and in agreement with the plan.  All questions answered.  Plan: -Continue Physical Therapy -Activities as Tolerated -Follow-up in 3 months for reevaluation and management

## 2024-01-14 NOTE — REVIEW OF SYSTEMS
[Negative] : Endocrine [Joint Stiffness] : no joint stiffness [Joint Pain] : no joint pain [Joint Swelling] : no joint swelling [Fever] : no fever [Chills] : no chills

## 2024-01-14 NOTE — PHYSICAL EXAM
[de-identified] : Constitutional:  63 year old female, alert and oriented, cooperative, in no acute distress.  HEENT  NC/AT.  Appearance: symmetric  Neck/Back Straight without deformity or instability.  Good ROM.  Chest/Respiratory  Respiratory effort: no intercostal retractions or use of accessory muscles. Nonlabored Breathing  Skin  On inspection, warm and dry without rashes or lesions.  Mental Status:  Judgment, insight: intact Orientation: oriented to time, place, and person  Neurological: Sensory and Motor are grossly intact throughout  Left Knee  Inspection:     Incision well healed. No erythema or drainage     No Effusion     Non-tender to palpation over tibial tubercle, patella, medial joint line, and pes insertion.    Mild tenderness over the IT Band  Range of Motion: 	Extension - 0 degrees 	Flexion - 120 degrees 	Alignment - Valgus 3 degrees 	Extensor lag: None  Stability:      Demonstrates no Varus or Valgus instability      Negative Anterior or Posterior drawer.      No mid flexion instability  Patella: stable, tracks well.    Right Knee  Inspection:     Incision well healed. No erythema or drainage     No Effusion     Non-tender to palpation over tibial tubercle, patella, medial joint line, and pes insertion.    Mild tenderness over the IT Band  Range of Motion: 	Extension - 0 degrees 	Flexion - 120 degrees 	Alignment - Valgus 3 degrees 	Extensor lag: None  Stability:      Demonstrates no Varus or Valgus instability      Negative Anterior or Posterior drawer.      No mid flexion instability  Patella: stable, tracks well.   Neurologic Exam     Motor intact including 5/5 Extensor Hallucis Longus, 5/5 Flexor Hallucis Longus, 5/5 Tibialis Anterior and 5/5 Gastrocnemius     Sensation Intact to Light Touch including Saphenous, Sural, Superficial Peroneal, Deep Peroneal, Tibial nerve distributions  Vascular Exam     Foot is warm and well perfused with 2+ Dorsalis Pedis Pulse   No pain with range of motion of the bilateral hips or left knee. No lumbar paraspinal muscle tenderness.  [de-identified] : XRay:  XRays of the Left Knee (3 Views) taken today in the office and discussed with the patient. XRays demonstrate a Left Total Knee Arthroplasty in good position and alignment. There is no obvious evidence of fracture, dislocation, osteolysis or loosening. (my personal interpretation) Components: [employer] Cemented  XRay:  XRays of the Right Knee (3 Views) taken today in the office and discussed with the patient. XRays demonstrate a Right Total Knee Arthroplasty in good position and alignment. There is no obvious evidence of fracture, dislocation, osteolysis or loosening. (my personal interpretation) Components: [employer] Cemented

## 2024-04-09 ENCOUNTER — APPOINTMENT (OUTPATIENT)
Dept: ORTHOPEDIC SURGERY | Facility: CLINIC | Age: 64
End: 2024-04-09

## 2024-04-09 ENCOUNTER — NON-APPOINTMENT (OUTPATIENT)
Age: 64
End: 2024-04-09

## 2024-11-25 NOTE — DISCHARGE NOTE PROVIDER - DISCHARGE DIET
Conjuntivae and eyelids appear normal , Sclerae : White without injection
Consistent Carbohydrate Diabetic Diets

## (undated) DEVICE — ELCTR BOVIE PENCIL SMOKE EVACUATION

## (undated) DEVICE — HOOD T5 PEELAWAY

## (undated) DEVICE — SUT STRATAFIX SPIRAL MONOCRYL PLUS 4-0 70CM PS-1 UNDYED

## (undated) DEVICE — SUT QUILL MONODERM 3-0 30CM 24MM

## (undated) DEVICE — POSITIONER STRAP ARMBOARD VELCRO TS-30

## (undated) DEVICE — PREP CHLORAPREP HI-LITE ORANGE 26ML

## (undated) DEVICE — TAPE SILK 3"

## (undated) DEVICE — SUT VICRYL 0 27" OS-6 UNDYED

## (undated) DEVICE — SYR LUER LOK 20CC

## (undated) DEVICE — SAW BLADE STRYKER SAGITTAL 3 HOLE OSCILLATING

## (undated) DEVICE — DRSG STOCKINETTE IMPERVIOUS XL

## (undated) DEVICE — SAW BLADE STRYKER RECIPROCATING DOUBLE EDGE 68X12.5MM

## (undated) DEVICE — FRAZIER SUCTION TIP 8FR

## (undated) DEVICE — LABELS BLANK W PEN

## (undated) DEVICE — CANISTER DISPOSABLE THIN WALL 3000CC

## (undated) DEVICE — TOURNIQUET ESMARK 6"

## (undated) DEVICE — DRSG CURITY GAUZE SPONGE 4 X 4" 12-PLY

## (undated) DEVICE — HANDPIECE INTERPULSE W/ COAXIAL FAN SPRAY TIP

## (undated) DEVICE — DRSG AQUACEL 3.5 X 10"

## (undated) DEVICE — DRSG ACE BANDAGE 6"

## (undated) DEVICE — PACK TOTAL JOINT

## (undated) DEVICE — MAKO VIZADISC KNEE TRACKING KIT

## (undated) DEVICE — VENODYNE/SCD SLEEVE CALF MEDIUM

## (undated) DEVICE — MAKO BLADE STANDARD

## (undated) DEVICE — SOL IRR BAG NS 0.9% 3000ML

## (undated) DEVICE — DRSG DERMABOND 0.7ML

## (undated) DEVICE — DRAPE U POLY BLUE 60"X60"

## (undated) DEVICE — SUT VICRYL 1 36" CTX UNDYED

## (undated) DEVICE — SOL IRR POUR NS 0.9% 1500ML

## (undated) DEVICE — DRAPE EXTREMITY 87" X 106" X 128"

## (undated) DEVICE — PACK LIJ BASIC ORTHO

## (undated) DEVICE — GOWN XXL

## (undated) DEVICE — DRAPE 3/4 SHEET 52X76"

## (undated) DEVICE — SUT VICRYL 2-0 27" PS-2 UNDYED

## (undated) DEVICE — TOURNIQUET CUFF 34" DUAL PORT W PLC

## (undated) DEVICE — SUCTION YANKAUER NO CONTROL VENT

## (undated) DEVICE — SOL IRR POUR H2O 1500ML

## (undated) DEVICE — GLV 8 PROTEXIS (CREAM) MICRO

## (undated) DEVICE — DRSG COBAN 6"

## (undated) DEVICE — NDL HYPO SAFE 21G X 1.5" (GREEN)

## (undated) DEVICE — ELCTR GROUNDING PAD ADULT COVIDIEN

## (undated) DEVICE — SUT QUILL PDO 2 36CM 40MM

## (undated) DEVICE — SUT STRATAFIX SPIRAL MONOCRYL 3-0 30CM RB-1 UNDYED

## (undated) DEVICE — DRAPE SPLIT SHEET 77" X 120"

## (undated) DEVICE — WARMING BLANKET FULL ADULT

## (undated) DEVICE — MAKO DRAPE KIT

## (undated) DEVICE — MAKO CHECKPOINT KIT FEMORAL / TIBIAL

## (undated) DEVICE — SAW BLADE STRYKER THCK LONG 1.24X83.5X18.5MM

## (undated) DEVICE — STRYKER MIXEVAC 3 BONE CEMENT MIXER

## (undated) DEVICE — SURGIPHOR STERILE WOUND IRR POVIDONE IODINE

## (undated) DEVICE — DRSG AQUACEL 3.5 X 12"

## (undated) DEVICE — MAKO BLADE NARROW

## (undated) DEVICE — PROTECTOR HEEL / ELBOW